# Patient Record
Sex: FEMALE | Race: WHITE | NOT HISPANIC OR LATINO | Employment: FULL TIME | URBAN - METROPOLITAN AREA
[De-identification: names, ages, dates, MRNs, and addresses within clinical notes are randomized per-mention and may not be internally consistent; named-entity substitution may affect disease eponyms.]

---

## 2017-04-07 ENCOUNTER — HOSPITAL ENCOUNTER (OUTPATIENT)
Dept: FAMILY MEDICINE CLINIC | Facility: CLINIC | Age: 57
Setting detail: SPECIMEN
Discharge: HOME | End: 2017-04-07
Attending: NURSE PRACTITIONER | Admitting: NURSE PRACTITIONER

## 2017-04-07 LAB
ANION GAP SERPL CALC-SCNC: 13.2 MMOL/L (ref 10–20)
BUN SERPL-MCNC: 20 MG/DL (ref 8–20)
BUN/CREAT SERPL: 28.6 (ref 5.4–26.2)
CALCIUM SERPL-MCNC: 9.4 MG/DL (ref 8.9–10.3)
CHLORIDE SERPL-SCNC: 104 MMOL/L (ref 101–111)
CONV CO2: 26 MMOL/L (ref 22–32)
CREAT UR-MCNC: 0.7 MG/DL (ref 0.4–1)
CRP SERPL-MCNC: 1.16 MG/DL (ref 0–0.7)
ERYTHROCYTE [SEDIMENTATION RATE] IN BLOOD BY WESTERGREN METHOD: 25 MM/HR (ref 0–30)
GLUCOSE SERPL-MCNC: 75 MG/DL (ref 65–99)
MAGNESIUM SERPL-MCNC: 2.1 MG/DL (ref 1.8–2.5)
POTASSIUM SERPL-SCNC: 4.2 MMOL/L (ref 3.6–5.1)
SODIUM SERPL-SCNC: 139 MMOL/L (ref 136–144)

## 2017-04-10 LAB
ANA SER QL IA: ABNORMAL
CENTROMERE B AB SER-ACNC: NEGATIVE
CHROMATIN AB SERPL-ACNC: <20 [IU]/ML (ref 0–20)
DSDNA AB SER-ACNC: NEGATIVE [IU]/ML
ENA JO1 IGG SER-ACNC: NEGATIVE
ENA RNP AB SER-ACNC: ABNORMAL
ENA SCL70 AB SER QL IA: NEGATIVE
ENA SM AB SER-ACNC: NEGATIVE
ENA SS-B AB SER-ACNC: NEGATIVE
HISTONE IGG SER IA-ACNC: NEGATIVE
SJOGREN'S ANTI-SS-A: NEGATIVE

## 2018-01-08 ENCOUNTER — HOSPITAL ENCOUNTER (OUTPATIENT)
Dept: LAB | Facility: HOSPITAL | Age: 58
Discharge: HOME | End: 2018-01-08
Attending: INTERNAL MEDICINE | Admitting: INTERNAL MEDICINE

## 2018-01-08 LAB
ALBUMIN SERPL-MCNC: 4.1 G/DL (ref 3.5–4.8)
ALBUMIN/GLOB SERPL: 1.3 {RATIO} (ref 1–1.7)
ALP SERPL-CCNC: 93 IU/L (ref 32–91)
ALT SERPL-CCNC: 22 IU/L (ref 14–54)
ANION GAP SERPL CALC-SCNC: 14 MMOL/L (ref 10–20)
AST SERPL-CCNC: 22 IU/L (ref 15–41)
BASOPHILS # BLD AUTO: 0.2 10*3/UL (ref 0–0.2)
BASOPHILS NFR BLD AUTO: 2 % (ref 0–2)
BILIRUB SERPL-MCNC: 0.3 MG/DL (ref 0.3–1.2)
BILIRUB UR QL STRIP: NEGATIVE MG/DL
BUN SERPL-MCNC: 11 MG/DL (ref 8–20)
BUN/CREAT SERPL: 15.7 (ref 5.4–26.2)
C3 SERPL-MCNC: 109 MG/DL (ref 79–152)
C4 SERPL-MCNC: 23.5 MG/DL (ref 18–55)
CALCIUM SERPL-MCNC: 9.3 MG/DL (ref 8.9–10.3)
CASTS URNS QL MICRO: ABNORMAL /[LPF]
CHLORIDE SERPL-SCNC: 104 MMOL/L (ref 101–111)
COLOR UR: YELLOW
CONV BACTERIA IN URINE MICRO: NEGATIVE
CONV CLARITY OF URINE: CLEAR
CONV CO2: 24 MMOL/L (ref 22–32)
CONV HYALINE CASTS IN URINE MICRO: 3 /[LPF] (ref 0–5)
CONV PROTEIN IN URINE BY AUTOMATED TEST STRIP: NEGATIVE MG/DL
CONV SMALL ROUND CELLS: ABNORMAL /[HPF]
CONV TOTAL PROTEIN: 7.3 G/DL (ref 6.1–7.9)
CONV UROBILINOGEN IN URINE BY AUTOMATED TEST STRIP: 0.2 MG/DL
CREAT UR-MCNC: 0.7 MG/DL (ref 0.4–1)
CRP SERPL-MCNC: 0.39 MG/DL (ref 0–0.7)
CULTURE INDICATED?: ABNORMAL
DIFFERENTIAL METHOD BLD: (no result)
EOSINOPHIL # BLD AUTO: 0.4 10*3/UL (ref 0–0.3)
EOSINOPHIL # BLD AUTO: 5 % (ref 0–3)
ERYTHROCYTE [DISTWIDTH] IN BLOOD BY AUTOMATED COUNT: 14.3 % (ref 11.5–14.5)
ERYTHROCYTE [SEDIMENTATION RATE] IN BLOOD BY WESTERGREN METHOD: 20 MM/HR (ref 0–30)
GLOBULIN UR ELPH-MCNC: 3.2 G/DL (ref 2.5–3.8)
GLUCOSE SERPL-MCNC: 81 MG/DL (ref 65–99)
GLUCOSE UR QL: NEGATIVE MG/DL
HAV IGM SERPL QL IA: NONREACTIVE
HBV CORE IGM SERPL QL IA: NONREACTIVE
HBV SURFACE AG SERPL QL IA: NONREACTIVE
HCT VFR BLD AUTO: 43.3 % (ref 35–49)
HCV AB SER DONR QL: NORMAL
HCV AB SER DONR QL: NORMAL
HGB BLD-MCNC: 14.1 G/DL (ref 12–15)
HGB UR QL STRIP: NEGATIVE
KETONES UR QL STRIP: NEGATIVE MG/DL
LEUKOCYTE ESTERASE UR QL STRIP: ABNORMAL
LYMPHOCYTES # BLD AUTO: 2 10*3/UL (ref 0.8–4.8)
LYMPHOCYTES NFR BLD AUTO: 26 % (ref 18–42)
MCH RBC QN AUTO: 29.9 PG (ref 26–32)
MCHC RBC AUTO-ENTMCNC: 32.5 G/DL (ref 32–36)
MCV RBC AUTO: 92 FL (ref 80–94)
MONOCYTES # BLD AUTO: 1.1 10*3/UL (ref 0.1–1.3)
MONOCYTES NFR BLD AUTO: 14 % (ref 2–11)
NEUTROPHILS # BLD AUTO: 4.1 10*3/UL (ref 2.3–8.6)
NEUTROPHILS NFR BLD AUTO: 53 % (ref 50–75)
NITRITE UR QL STRIP: NEGATIVE
NRBC BLD AUTO-RTO: 0 /100{WBCS}
NRBC/RBC NFR BLD MANUAL: 0 10*3/UL
PH UR STRIP.AUTO: 6.5 [PH] (ref 4.5–8)
PLATELET # BLD AUTO: 327 10*3/UL (ref 150–450)
PMV BLD AUTO: 9 FL (ref 7.4–10.4)
POTASSIUM SERPL-SCNC: 4 MMOL/L (ref 3.6–5.1)
RBC # BLD AUTO: 4.71 10*6/UL (ref 4–5.4)
RBC #/AREA URNS HPF: 1 /[HPF] (ref 0–3)
SODIUM SERPL-SCNC: 138 MMOL/L (ref 136–144)
SP GR UR: 1.01 (ref 1–1.03)
SPERM URNS QL MICRO: ABNORMAL /[HPF]
SQUAMOUS SPT QL MICRO: 3 /[HPF] (ref 0–5)
UNIDENT CRYS URNS QL MICRO: ABNORMAL /[HPF]
WBC # BLD AUTO: 7.7 10*3/UL (ref 4.5–11.5)
WBC #/AREA URNS HPF: 4 /[HPF] (ref 0–5)
YEAST SPEC QL WET PREP: ABNORMAL /[HPF]

## 2018-01-09 LAB
CONV HIV-1/ HIV-2: NORMAL
CONV HIV-1/ HIV-2: NORMAL

## 2018-01-11 LAB
ANTI-CARDIOLIPIN IGG ANTIBODY: <14 GPL
INR PPP: 1
PTT LA MIX: 41 SEC
SCREEN DRVVT: 35 SEC

## 2019-01-19 ENCOUNTER — HOSPITAL ENCOUNTER (OUTPATIENT)
Dept: LAB | Facility: HOSPITAL | Age: 59
Discharge: HOME | End: 2019-01-19
Attending: FAMILY MEDICINE | Admitting: FAMILY MEDICINE

## 2019-01-19 LAB
ALBUMIN SERPL-MCNC: 3.8 G/DL (ref 3.5–4.8)
ALBUMIN/GLOB SERPL: 1.2 {RATIO} (ref 1–1.7)
ALP SERPL-CCNC: 95 IU/L (ref 32–91)
ALT SERPL-CCNC: 17 IU/L (ref 14–54)
ANION GAP SERPL CALC-SCNC: 15.5 MMOL/L (ref 10–20)
AST SERPL-CCNC: 18 IU/L (ref 15–41)
BASOPHILS # BLD AUTO: 0.1 10*3/UL (ref 0–0.2)
BASOPHILS NFR BLD AUTO: 1 % (ref 0–2)
BILIRUB SERPL-MCNC: 0.4 MG/DL (ref 0.3–1.2)
BUN SERPL-MCNC: 16 MG/DL (ref 8–20)
BUN/CREAT SERPL: 20 (ref 5.4–26.2)
CALCIUM SERPL-MCNC: 9.2 MG/DL (ref 8.9–10.3)
CHLORIDE SERPL-SCNC: 98 MMOL/L (ref 101–111)
CHOLEST SERPL-MCNC: 197 MG/DL
CHOLEST/HDLC SERPL: 3.7 {RATIO}
CONV CO2: 25 MMOL/L (ref 22–32)
CONV LDL CHOLESTEROL DIRECT: 128 MG/DL (ref 0–100)
CONV TOTAL PROTEIN: 6.9 G/DL (ref 6.1–7.9)
CREAT UR-MCNC: 0.8 MG/DL (ref 0.4–1)
DIFFERENTIAL METHOD BLD: (no result)
EOSINOPHIL # BLD AUTO: 0.3 10*3/UL (ref 0–0.3)
EOSINOPHIL # BLD AUTO: 3 % (ref 0–3)
ERYTHROCYTE [DISTWIDTH] IN BLOOD BY AUTOMATED COUNT: 14.1 % (ref 11.5–14.5)
GLOBULIN UR ELPH-MCNC: 3.1 G/DL (ref 2.5–3.8)
GLUCOSE SERPL-MCNC: 98 MG/DL (ref 65–99)
HCT VFR BLD AUTO: 43.8 % (ref 35–49)
HDLC SERPL-MCNC: 53 MG/DL
HGB BLD-MCNC: 15.2 G/DL (ref 12–15)
IRON SATN MFR SERPL: 14 % (ref 15–50)
IRON SERPL-MCNC: 58 UG/DL (ref 28–170)
LDLC/HDLC SERPL: 2.4 {RATIO}
LIPID INTERPRETATION: ABNORMAL
LYMPHOCYTES # BLD AUTO: 1.6 10*3/UL (ref 0.8–4.8)
LYMPHOCYTES NFR BLD AUTO: 15 % (ref 18–42)
MAGNESIUM SERPL-MCNC: 2 MG/DL (ref 1.8–2.5)
MCH RBC QN AUTO: 31 PG (ref 26–32)
MCHC RBC AUTO-ENTMCNC: 34.6 G/DL (ref 32–36)
MCV RBC AUTO: 89.5 FL (ref 80–94)
MONOCYTES # BLD AUTO: 1.6 10*3/UL (ref 0.1–1.3)
MONOCYTES NFR BLD AUTO: 14 % (ref 2–11)
NEUTROPHILS # BLD AUTO: 7.4 10*3/UL (ref 2.3–8.6)
NEUTROPHILS NFR BLD AUTO: 67 % (ref 50–75)
NRBC BLD AUTO-RTO: 0 /100{WBCS}
NRBC/RBC NFR BLD MANUAL: 0 10*3/UL
PLATELET # BLD AUTO: 355 10*3/UL (ref 150–450)
PMV BLD AUTO: 7.9 FL (ref 7.4–10.4)
POTASSIUM SERPL-SCNC: 4.5 MMOL/L (ref 3.6–5.1)
RBC # BLD AUTO: 4.9 10*6/UL (ref 4–5.4)
SODIUM SERPL-SCNC: 134 MMOL/L (ref 136–144)
TIBC SERPL-MCNC: 404 UG/DL (ref 228–428)
TRIGL SERPL-MCNC: 68 MG/DL
VLDLC SERPL CALC-MCNC: 16.4 MG/DL
WBC # BLD AUTO: 11 10*3/UL (ref 4.5–11.5)

## 2019-02-02 ENCOUNTER — HOSPITAL ENCOUNTER (OUTPATIENT)
Dept: MAMMOGRAPHY | Facility: HOSPITAL | Age: 59
Discharge: HOME | End: 2019-02-02
Attending: FAMILY MEDICINE | Admitting: FAMILY MEDICINE

## 2019-02-14 ENCOUNTER — HOSPITAL ENCOUNTER (OUTPATIENT)
Dept: MAMMOGRAPHY | Facility: HOSPITAL | Age: 59
Discharge: HOME | End: 2019-02-14
Attending: FAMILY MEDICINE | Admitting: FAMILY MEDICINE

## 2019-07-24 ENCOUNTER — TELEPHONE (OUTPATIENT)
Dept: FAMILY MEDICINE CLINIC | Facility: CLINIC | Age: 59
End: 2019-07-24

## 2019-07-24 DIAGNOSIS — Z12.31 SCREENING MAMMOGRAM, ENCOUNTER FOR: Primary | ICD-10-CM

## 2019-07-25 ENCOUNTER — TELEPHONE (OUTPATIENT)
Dept: FAMILY MEDICINE CLINIC | Facility: CLINIC | Age: 59
End: 2019-07-25

## 2019-07-25 DIAGNOSIS — R92.8 ABNORMAL MAMMOGRAM: Primary | ICD-10-CM

## 2019-07-25 NOTE — TELEPHONE ENCOUNTER
RECEIVED CALL FROM DENNIS AT [facility]. PT IS DUE FOR A 6 MONTH F/U DIAG MAMMO ON THE LEFT, NOT A SCREENING. PLEASE ENTER NEW ORDER.

## 2019-08-16 ENCOUNTER — HOSPITAL ENCOUNTER (OUTPATIENT)
Dept: ULTRASOUND IMAGING | Facility: HOSPITAL | Age: 59
Discharge: HOME | End: 2019-08-16

## 2019-08-16 ENCOUNTER — HOSPITAL ENCOUNTER (OUTPATIENT)
Dept: MAMMOGRAPHY | Facility: HOSPITAL | Age: 59
Discharge: HOME | End: 2019-08-16
Admitting: FAMILY MEDICINE

## 2019-08-16 DIAGNOSIS — R92.8 ABNORMAL MAMMOGRAM: ICD-10-CM

## 2019-08-16 PROCEDURE — G0279 TOMOSYNTHESIS, MAMMO: HCPCS

## 2019-08-16 PROCEDURE — 77065 DX MAMMO INCL CAD UNI: CPT

## 2019-12-12 NOTE — TELEPHONE ENCOUNTER
PREVIOUS DR ZEPEDA PT -  PT CHANGING TO DR BARNES -   REQUESTS REFILL ON BREO  INHALER -   PT WILL BE OUT OF MEDICATION TOMORROW  -       PHAR   [facility] -   CALL IF ANY QUESTIONS  -    PT STATES SHE HAS LEFT SEVERAL MESSAGES PREVIOUSLY

## 2019-12-14 NOTE — TELEPHONE ENCOUNTER
Refill of Breo sent to patient's pharmacy.      Signature    Maria De Jesus Justin MD  Family [facility]        This document has been electronically signed by Maria De Jesus Justin MD on December 14, 2019 5:39 PM

## 2019-12-15 NOTE — TELEPHONE ENCOUNTER
Prescription sent on 12/14/2019.   EMR indicates that pharmacy received prescription.     Signature    Maria De Jesus Justin MD  Family Medicine  [facility]        This document has been electronically signed by Maria De Jesus Justin MD on December 15, 2019 12:25 PM

## 2020-03-25 ENCOUNTER — TELEPHONE (OUTPATIENT)
Dept: FAMILY MEDICINE CLINIC | Facility: CLINIC | Age: 60
End: 2020-03-25

## 2020-03-25 DIAGNOSIS — J01.90 ACUTE SINUSITIS, RECURRENCE NOT SPECIFIED, UNSPECIFIED LOCATION: Primary | ICD-10-CM

## 2020-03-25 RX ORDER — GUAIFENESIN AND DEXTROMETHORPHAN HYDROBROMIDE 600; 30 MG/1; MG/1
1 TABLET, EXTENDED RELEASE ORAL 2 TIMES DAILY PRN
Qty: 20 TABLET | Refills: 0 | Status: SHIPPED | OUTPATIENT
Start: 2020-03-25 | End: 2020-05-01

## 2020-03-25 RX ORDER — AZITHROMYCIN 250 MG/1
TABLET, FILM COATED ORAL
Qty: 6 TABLET | Refills: 0 | Status: SHIPPED | OUTPATIENT
Start: 2020-03-25 | End: 2020-05-01

## 2020-03-25 RX ORDER — FLUTICASONE PROPIONATE 50 MCG
2 SPRAY, SUSPENSION (ML) NASAL DAILY
Qty: 11.1 ML | Refills: 5 | Status: SHIPPED | OUTPATIENT
Start: 2020-03-25 | End: 2020-05-01

## 2020-03-25 NOTE — TELEPHONE ENCOUNTER
Patient called think it her allergies, but she is coughing, congested, and watery eyes. She does take care of disable daughter so it has her worried with every thing going on right now. Good number to call back is [phone]

## 2020-03-25 NOTE — TELEPHONE ENCOUNTER
Returned patient's call.    Patient is a former smoker, quit in 2011, with a concurrent medical history of asthma and allergies.  Asthma is currently treated with Brio inhaler and PRN albuterol.  Patient reports compliance with maintenance inhalers.  Reports occasional antihistamine use for seasonal allergies.  Reports compliance with Flonase.  Instructed patient on appropriate use of Flonase.    Patient complains of 3 weeks of sinus congestion, rhinorrhea, postnasal drip, cough, and chest congestion.  Denies any fever or shortness of breath.  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  Patient requesting a prescription for azithromycin.  Z-Benjie, Mucinex, and Flonase sent to patient's pharmacy.  Patient advised to call the office if symptoms worsen.  Encouraged adequate hydration and personal hand hygiene.

## 2020-04-20 RX ORDER — ATORVASTATIN CALCIUM 10 MG/1
10 TABLET, FILM COATED ORAL NIGHTLY
Qty: 30 TABLET | Refills: 3 | Status: SHIPPED | OUTPATIENT
Start: 2020-04-20 | End: 2020-08-20

## 2020-04-20 RX ORDER — DULOXETIN HYDROCHLORIDE 60 MG/1
60 CAPSULE, DELAYED RELEASE ORAL DAILY
Qty: 30 CAPSULE | Refills: 3 | Status: SHIPPED | OUTPATIENT
Start: 2020-04-20 | End: 2020-08-20

## 2020-05-01 ENCOUNTER — OFFICE VISIT (OUTPATIENT)
Dept: FAMILY MEDICINE CLINIC | Facility: CLINIC | Age: 60
End: 2020-05-01

## 2020-05-01 VITALS
WEIGHT: 117 LBS | BODY MASS INDEX: 20.73 KG/M2 | DIASTOLIC BLOOD PRESSURE: 74 MMHG | SYSTOLIC BLOOD PRESSURE: 122 MMHG | OXYGEN SATURATION: 99 % | HEART RATE: 93 BPM | TEMPERATURE: 98.1 F

## 2020-05-01 DIAGNOSIS — J30.89 ENVIRONMENTAL AND SEASONAL ALLERGIES: ICD-10-CM

## 2020-05-01 DIAGNOSIS — Z12.31 ENCOUNTER FOR SCREENING MAMMOGRAM FOR MALIGNANT NEOPLASM OF BREAST: ICD-10-CM

## 2020-05-01 DIAGNOSIS — Z12.11 ENCOUNTER FOR SCREENING FOR MALIGNANT NEOPLASM OF COLON: ICD-10-CM

## 2020-05-01 DIAGNOSIS — E78.2 MIXED HYPERLIPIDEMIA: ICD-10-CM

## 2020-05-01 DIAGNOSIS — F41.8 MIXED ANXIETY DEPRESSIVE DISORDER: Primary | ICD-10-CM

## 2020-05-01 DIAGNOSIS — Z11.59 NEED FOR HEPATITIS C SCREENING TEST: ICD-10-CM

## 2020-05-01 DIAGNOSIS — E55.9 VITAMIN D DEFICIENCY: ICD-10-CM

## 2020-05-01 PROBLEM — M32.9 SLE (SYSTEMIC LUPUS ERYTHEMATOSUS) (HCC): Status: ACTIVE | Noted: 2018-01-08

## 2020-05-01 PROBLEM — M19.90 OSTEOARTHRITIS: Status: ACTIVE | Noted: 2018-03-05

## 2020-05-01 PROBLEM — M62.838 MUSCLE SPASM: Status: ACTIVE | Noted: 2020-05-01

## 2020-05-01 PROCEDURE — 80053 COMPREHEN METABOLIC PANEL: CPT | Performed by: FAMILY MEDICINE

## 2020-05-01 PROCEDURE — 86803 HEPATITIS C AB TEST: CPT | Performed by: FAMILY MEDICINE

## 2020-05-01 PROCEDURE — 80061 LIPID PANEL: CPT | Performed by: FAMILY MEDICINE

## 2020-05-01 PROCEDURE — 82306 VITAMIN D 25 HYDROXY: CPT | Performed by: FAMILY MEDICINE

## 2020-05-01 PROCEDURE — 85025 COMPLETE CBC W/AUTO DIFF WBC: CPT | Performed by: FAMILY MEDICINE

## 2020-05-01 PROCEDURE — 36415 COLL VENOUS BLD VENIPUNCTURE: CPT | Performed by: FAMILY MEDICINE

## 2020-05-01 PROCEDURE — 99214 OFFICE O/P EST MOD 30 MIN: CPT | Performed by: FAMILY MEDICINE

## 2020-05-01 NOTE — PROGRESS NOTES
Subjective:     Justa Coles is a 60 y.o. female who presents for  Chief Complaint   Patient presents with   • Anxiety     f/u on anxiety,chol,meds   • Hyperlipidemia       This is a new patient to me.  Presents establish care. Patient works as a home health nurse.    HPI    Patient has a concurrent medical history of hyperlipidemia that is currently well controlled with atorvastatin 10 mg daily.  Patient is normotensive in office.  Reports generally unhealthy diet.  Does not participate in regular exercise; walks on occasion.  Plan to recheck lipid panel today.    Patient has a concurrent medical history of seasonal allergies. Complains of nasal congestion, watery eyes, occasional dyspnea, and cough that may or may not be productive. Patient was started on Breo by a former healthcare provider and reported improvement of cough and dyspnea. Of note patient is a former smoker of 25 years smoking ~1 PPD; quit smoking approximately 9 years ago.    Patient has a concurrent medical history of anxiety and depression that is poorly controlled. Symptoms are exacerbated by sick  and caring for daughter with Down syndrome. Complains of trouble concentrating and diminished appetite. Patient is not compliant with pharmacotherapy, Cymbalta 60 mg; restarted therapy approximately a month ago due to declining health of  and COVID-19 pandemic.     Preventative:  PHQ-9 Depression Screening  Little interest or pleasure in doing things? 0(not able to do things due to lack of time)   Feeling down, depressed, or hopeless? 0   Trouble falling or staying asleep, or sleeping too much?     Feeling tired or having little energy?     Poor appetite or overeating?     Feeling bad about yourself - or that you are a failure or have let yourself or your family down?     Trouble concentrating on things, such as reading the newspaper or watching television?     Moving or speaking so slowly that other people could have noticed? Or  the opposite - being so fidgety or restless that you have been moving around a lot more than usual?     Thoughts that you would be better off dead, or of hurting yourself in some way?     PHQ-9 Total Score 0   If you checked off any problems, how difficult have these problems made it for you to do your work, take care of things at home, or get along with other people?       Health Maintenance   Topic Date Due   • ANNUAL PHYSICAL  1963   • TDAP/TD VACCINES (1 - Tdap) 1970   • PAP SMEAR  2019   • COLONOSCOPY  2019   • LIPID PANEL  2020   • INFLUENZA VACCINE  2020   • MAMMOGRAM  2021   • HEPATITIS C SCREENING  Completed   • ZOSTER VACCINE  Completed       Past Medical Hx:  Past Medical History:   Diagnosis Date   • Hyperlipidemia        Past Surgical Hx:  Past Surgical History:   Procedure Laterality Date   • APPENDECTOMY     •  SECTION     • CYST REMOVAL      behind left knee   • TONSILLECTOMY         Family Hx:  Family History   Problem Relation Age of Onset   • Cancer Father    • Cancer Maternal Grandmother    • Cancer Paternal Grandmother         Social History:  Social History     Socioeconomic History   • Marital status:      Spouse name: Not on file   • Number of children: Not on file   • Years of education: Not on file   • Highest education level: Not on file   Tobacco Use   • Smoking status: Former Smoker     Last attempt to quit: 2011     Years since quittin.9   • Smokeless tobacco: Never Used   Substance and Sexual Activity   • Alcohol use: Not Currently     Frequency: Never   • Drug use: Never       Allergies:  Sulfa antibiotics    Current Meds:    Current Outpatient Medications:   •  albuterol sulfate HFA (PROAIR HFA) 108 (90 Base) MCG/ACT inhaler, PROAIR  (90 Base) MCG/ACT AERS, Disp: , Rfl:   •  atorvastatin (LIPITOR) 10 MG tablet, Take 1 tablet by mouth Every Night., Disp: 30 tablet, Rfl: 3  •  DULoxetine (CYMBALTA) 60 MG capsule,  Take 1 capsule by mouth Daily., Disp: 30 capsule, Rfl: 3  •  Fluticasone Furoate-Vilanterol (BREO ELLIPTA) 200-25 MCG/INH inhaler, Inhale 1 puff Daily., Disp: 28 each, Rfl: 5    The following portions of the patient's history were reviewed and updated as appropriate: allergies, current medications, past family history, past medical history, past social history, past surgical history and problem list.    Review of Systems  Review of Systems   Constitutional: Negative for chills, diaphoresis, fatigue and fever.   HENT: Negative for congestion, rhinorrhea, sinus pressure, sneezing and sore throat.    Eyes: Negative for blurred vision, double vision and redness.   Respiratory: Positive for cough. Negative for shortness of breath and wheezing.    Cardiovascular: Negative for chest pain and leg swelling.   Gastrointestinal: Negative for abdominal pain, constipation, diarrhea, nausea and vomiting.   Endocrine: Negative for polydipsia, polyphagia and polyuria.   Genitourinary: Negative for difficulty urinating, dysuria and hematuria.   Musculoskeletal: Positive for myalgias (recurrent and waxing and waning). Negative for arthralgias and gait problem.   Skin: Negative for rash and skin lesions.   Allergic/Immunologic: Positive for environmental allergies.   Neurological: Negative for tremors, seizures, syncope and headache.   Psychiatric/Behavioral: Positive for stress. Negative for sleep disturbance and depressed mood. The patient is not nervous/anxious.        Objective:     /74 (BP Location: Left arm, Patient Position: Sitting, Cuff Size: Small Adult)   Pulse 93   Temp 98.1 °F (36.7 °C) (Oral)   Wt 53.1 kg (117 lb)   SpO2 99%   BMI 20.73 kg/m²     Physical Exam   Constitutional: She is oriented to person, place, and time. She appears well-developed and well-nourished. No distress.   HENT:   Head: Normocephalic and atraumatic.   Right Ear: Tympanic membrane and ear canal normal.   Left Ear: Tympanic membrane and  ear canal normal.   Nose: Nose normal.   Mouth/Throat: Oropharynx is clear and moist and mucous membranes are normal. She has dentures (maxillary).   Eyes: Pupils are equal, round, and reactive to light. Conjunctivae and EOM are normal. No scleral icterus.   Neck: Neck supple. No tracheal deviation present. No thyromegaly present.   Cardiovascular: Normal rate, regular rhythm, normal heart sounds and intact distal pulses.   Pulmonary/Chest: Effort normal and breath sounds normal.   Abdominal: Soft. Bowel sounds are normal. There is no tenderness.   Lymphadenopathy:     She has no cervical adenopathy.   Neurological: She is alert and oriented to person, place, and time.   Skin: Skin is warm and dry. Capillary refill takes less than 2 seconds. No rash noted. She is not diaphoretic.   Psychiatric: She has a normal mood and affect. Her behavior is normal.   Vitals reviewed.      Lab Results   Component Value Date    WBC 11.0 01/19/2019    HGB 15.2 (H) 01/19/2019    HCT 43.8 01/19/2019    MCV 89.5 01/19/2019     01/19/2019     Lab Results   Component Value Date    GLUCOSE 98 01/19/2019    BUN 16 01/19/2019    CREATININE 0.8 01/19/2019    BCR 20.0 01/19/2019    K 4.5 01/19/2019    CO2 25 01/19/2019    CALCIUM 9.2 01/19/2019    ALBUMIN 3.8 01/19/2019    LABIL2 1.2 01/19/2019    AST 18 01/19/2019    ALT 17 01/19/2019     Lab Results   Component Value Date    CHOL 197 01/19/2019    TRIG 68 01/19/2019    HDL 53 01/19/2019     (H) 01/19/2019     The 10-year ASCVD risk score ([place] JEROMY Jr., et al., 2013) is: 3.4%    Values used to calculate the score:      Age: 60 years      Sex: Female      Is Non- : No      Diabetic: No      Tobacco smoker: No      Systolic Blood Pressure: 122 mmHg      Is BP treated: No      HDL Cholesterol: 53 mg/dL      Total Cholesterol: 197 mg/dL       Assessment/Plan:     Justa was seen today for anxiety and hyperlipidemia.    Diagnoses and all orders for this  visit:    Mixed anxiety depressive disorder  Comments:  Poorly controlled due to medication compliance.  Continue Cymbalta 60 mg.  Educational material in AVS.  Orders:  -     Comprehensive Metabolic Panel  -     CBC Auto Differential    Mixed hyperlipidemia  Comments:  Reviewed lipid panel & 10-year ASCVD risk score.  Encouraged a diet rich in vegetables & 150 minutes of exercise weekly.  Continue atorvastatin 10 mg.  Orders:  -     Lipid Panel    Environmental and seasonal allergies  Comments:  Moderately controlled with Breo Ellipta daily.  May benefit from PFT to evaluate for respiratory dysfunction.    Vitamin D deficiency  -     Vitamin D 25 hydroxy    Need for hepatitis C screening test  -     Hepatitis C antibody    Encounter for screening mammogram for malignant neoplasm of breast  -     Mammo Screening Digital Tomosynthesis Bilateral With CAD; Future    Encounter for screening for malignant neoplasm of colon  -     Ambulatory Referral For Screening Colonoscopy      Follow-up:     Return in about 6 months (around 11/1/2020) for Annual Physical Exam & Pap.      Signature    Maria De Jesus Justin MD  Family Medicine  [facility]        This document has been electronically signed by Maria De Jesus Justin MD on May 1, 2020 08:39

## 2020-05-01 NOTE — PATIENT INSTRUCTIONS
Preventive Care 40-64 Years Old, Female  Preventive care refers to visits with your health care provider and lifestyle choices that can promote health and wellness. This includes:  · A yearly physical exam. This may also be called an annual well check.  · Regular dental visits and eye exams.  · Immunizations.  · Screening for certain conditions.  · Healthy lifestyle choices, such as eating a healthy diet, getting regular exercise, not using drugs or products that contain nicotine and tobacco, and limiting alcohol use.  What can I expect for my preventive care visit?  Physical exam  Your health care provider will check your:  · Height and weight. This may be used to calculate body mass index (BMI), which tells if you are at a healthy weight.  · Heart rate and blood pressure.  · Skin for abnormal spots.  Counseling  Your health care provider may ask you questions about your:  · Alcohol, tobacco, and drug use.  · Emotional well-being.  · Home and relationship well-being.  · Sexual activity.  · Eating habits.  · Work and work environment.  · Method of birth control.  · Menstrual cycle.  · Pregnancy history.  What immunizations do I need?    Influenza (flu) vaccine  · This is recommended every year.  Tetanus, diphtheria, and pertussis (Tdap) vaccine  · You may need a Td booster every 10 years.  Varicella (chickenpox) vaccine  · You may need this if you have not been vaccinated.  Zoster (shingles) vaccine  · You may need this after age 60.  Measles, mumps, and rubella (MMR) vaccine  · You may need at least one dose of MMR if you were born in 1957 or later. You may also need a second dose.  Pneumococcal conjugate (PCV13) vaccine  · You may need this if you have certain conditions and were not previously vaccinated.  Pneumococcal polysaccharide (PPSV23) vaccine  · You may need one or two doses if you smoke cigarettes or if you have certain conditions.  Meningococcal conjugate (MenACWY) vaccine  · You may need this if you  have certain conditions.  Hepatitis A vaccine  · You may need this if you have certain conditions or if you travel or work in places where you may be exposed to hepatitis A.  Hepatitis B vaccine  · You may need this if you have certain conditions or if you travel or work in places where you may be exposed to hepatitis B.  Haemophilus influenzae type b (Hib) vaccine  · You may need this if you have certain conditions.  Human papillomavirus (HPV) vaccine  · If recommended by your health care provider, you may need three doses over 6 months.  You may receive vaccines as individual doses or as more than one vaccine together in one shot (combination vaccines). Talk with your health care provider about the risks and benefits of combination vaccines.  What tests do I need?  Blood tests  · Lipid and cholesterol levels. These may be checked every 5 years, or more frequently if you are over 50 years old.  · Hepatitis C test.  · Hepatitis B test.  Screening  · Lung cancer screening. You may have this screening every year starting at age 55 if you have a 30-pack-year history of smoking and currently smoke or have quit within the past 15 years.  · Colorectal cancer screening. All adults should have this screening starting at age 50 and continuing until age 75. Your health care provider may recommend screening at age 45 if you are at increased risk. You will have tests every 1-10 years, depending on your results and the type of screening test.  · Diabetes screening. This is done by checking your blood sugar (glucose) after you have not eaten for a while (fasting). You may have this done every 1-3 years.  · Mammogram. This may be done every 1-2 years. Talk with your health care provider about when you should start having regular mammograms. This may depend on whether you have a family history of breast cancer.  · BRCA-related cancer screening. This may be done if you have a family history of breast, ovarian, tubal, or peritoneal  cancers.  · Pelvic exam and Pap test. This may be done every 3 years starting at age 21. Starting at age 30, this may be done every 5 years if you have a Pap test in combination with an HPV test.  Other tests  · Sexually transmitted disease (STD) testing.  · Bone density scan. This is done to screen for osteoporosis. You may have this scan if you are at high risk for osteoporosis.  Follow these instructions at home:  Eating and drinking  · Eat a diet that includes fresh fruits and vegetables, whole grains, lean protein, and low-fat dairy.  · Take vitamin and mineral supplements as recommended by your health care provider.  · Do not drink alcohol if:  ? Your health care provider tells you not to drink.  ? You are pregnant, may be pregnant, or are planning to become pregnant.  · If you drink alcohol:  ? Limit how much you have to 0-1 drink a day.  ? Be aware of how much alcohol is in your drink. In the [place] one drink equals one 12 oz bottle of beer (355 mL), one 5 oz glass of wine (148 mL), or one 1½ oz glass of hard liquor (44 mL).  Lifestyle  · Take daily care of your teeth and gums.  · Stay active. Exercise for at least 30 minutes on 5 or more days each week.  · Do not use any products that contain nicotine or tobacco, such as cigarettes, e-cigarettes, and chewing tobacco. If you need help quitting, ask your health care provider.  · If you are sexually active, practice safe sex. Use a condom or other form of birth control (contraception) in order to prevent pregnancy and STIs (sexually transmitted infections).  · If told by your health care provider, take low-dose aspirin daily starting at age 50.  What's next?  · Visit your health care provider once a year for a well check visit.  · Ask your health care provider how often you should have your eyes and teeth checked.  · Stay up to date on all vaccines.  This information is not intended to replace advice given to you by your health care provider. Make sure you  discuss any questions you have with your health care provider.  Document Released: 01/13/2017 Document Revised: 08/29/2019 Document Reviewed: 08/29/2019  [employer] Interactive Patient Education © 2020 [employer]      Exercising to Stay Healthy  To become healthy and stay healthy, it is recommended that you do moderate-intensity and vigorous-intensity exercise. You can tell that you are exercising at a moderate intensity if your heart starts beating faster and you start breathing faster but can still hold a conversation. You can tell that you are exercising at a vigorous intensity if you are breathing much harder and faster and cannot hold a conversation while exercising.  Exercising regularly is important. It has many health benefits, such as:  · Improving overall fitness, flexibility, and endurance.  · Increasing bone density.  · Helping with weight control.  · Decreasing body fat.  · Increasing muscle strength.  · Reducing stress and tension.  · Improving overall health.  How often should I exercise?  Choose an activity that you enjoy, and set realistic goals. Your health care provider can help you make an activity plan that works for you.  Exercise regularly as told by your health care provider. This may include:  · Doing strength training two times a week, such as:  ? Lifting weights.  ? Using resistance bands.  ? Push-ups.  ? Sit-ups.  ? Yoga.  · Doing a certain intensity of exercise for a given amount of time. Choose from these options:  ? A total of 150 minutes of moderate-intensity exercise every week.  ? A total of 75 minutes of vigorous-intensity exercise every week.  ? A mix of moderate-intensity and vigorous-intensity exercise every week.  Children, pregnant women, people who have not exercised regularly, people who are overweight, and older adults may need to talk with a health care provider about what activities are safe to do. If you have a medical condition, be sure to talk with your health care  provider before you start a new exercise program.  What are some exercise ideas?  Moderate-intensity exercise ideas include:  · Walking 1 mile (1.6 km) in about 15 minutes.  · Biking.  · Hiking.  · Golfing.  · Dancing.  · Water aerobics.  Vigorous-intensity exercise ideas include:  · Walking 4.5 miles (7.2 km) or more in about 1 hour.  · Jogging or running 5 miles (8 km) in about 1 hour.  · Biking 10 miles (16.1 km) or more in about 1 hour.  · Lap swimming.  · Roller-skating or in-line skating.  · Cross-country skiing.  · Vigorous competitive sports, such as football, basketball, and soccer.  · Jumping rope.  · Aerobic dancing.  What are some everyday activities that can help me to get exercise?  · Yard work, such as:  ? Pushing a .  ? Raking and bagging leaves.  · Washing your car.  · Pushing a stroller.  · Shoveling snow.  · Gardening.  · Washing windows or floors.  How can I be more active in my day-to-day activities?  · Use stairs instead of an elevator.  · Take a walk during your lunch break.  · If you drive, park your car farther away from your work or school.  · If you take public transportation, get off one stop early and walk the rest of the way.  · Stand up or walk around during all of your indoor phone calls.  · Get up, stretch, and walk around every 30 minutes throughout the day.  · Enjoy exercise with a friend. Support to continue exercising will help you keep a regular routine of activity.  What guidelines can I follow while exercising?  · Before you start a new exercise program, talk with your health care provider.  · Do not exercise so much that you hurt yourself, feel dizzy, or get very short of breath.  · Wear comfortable clothes and wear shoes with good support.  · Drink plenty of water while you exercise to prevent dehydration or heat stroke.  · Work out until your breathing and your heartbeat get faster.  Where to find more information  · [place] Department of Health and Human Services:  www.hhs.gov  · Centers for Disease Control and Prevention (CDC): www.cdc.gov  Summary  · Exercising regularly is important. It will improve your overall fitness, flexibility, and endurance.  · Regular exercise also will improve your overall health. It can help you control your weight, reduce stress, and improve your bone density.  · Do not exercise so much that you hurt yourself, feel dizzy, or get very short of breath.  · Before you start a new exercise program, talk with your health care provider.  This information is not intended to replace advice given to you by your health care provider. Make sure you discuss any questions you have with your health care provider.  Document Released: 01/20/2012 Document Revised: 11/08/2018 Document Reviewed: 11/08/2018  [employer] Interactive Patient Education © 2020 [employer]      Living With Anxiety    After being diagnosed with an anxiety disorder, you may be relieved to know why you have felt or behaved a certain way. It is natural to also feel overwhelmed about the treatment ahead and what it will mean for your life. With care and support, you can manage this condition and recover from it.  How to cope with anxiety  Dealing with stress  Stress is your body’s reaction to life changes and events, both good and bad. Stress can last just a few hours or it can be ongoing. Stress can play a major role in anxiety, so it is important to learn both how to cope with stress and how to think about it differently.  Talk with your health care provider or a counselor to learn more about stress reduction. He or she may suggest some stress reduction techniques, such as:  · Music therapy. This can include creating or listening to music that you enjoy and that inspires you.  · Mindfulness-based meditation. This involves being aware of your normal breaths, rather than trying to control your breathing. It can be done while sitting or walking.  · Centering prayer. This is a kind of meditation  that involves focusing on a word, phrase, or sacred image that is meaningful to you and that brings you peace.  · Deep breathing. To do this, expand your stomach and inhale slowly through your nose. Hold your breath for 3-5 seconds. Then exhale slowly, allowing your stomach muscles to relax.  · Self-talk. This is a skill where you identify thought patterns that lead to anxiety reactions and correct those thoughts.  · Muscle relaxation. This involves tensing muscles then relaxing them.  Choose a stress reduction technique that fits your lifestyle and personality. Stress reduction techniques take time and practice. Set aside 5-15 minutes a day to do them. Therapists can offer training in these techniques. The training may be covered by some insurance plans. Other things you can do to manage stress include:  · Keeping a stress diary. This can help you learn what triggers your stress and ways to control your response.  · Thinking about how you respond to certain situations. You may not be able to control everything, but you can control your reaction.  · Making time for activities that help you relax, and not feeling guilty about spending your time in this way.  Therapy combined with coping and stress-reduction skills provides the best chance for successful treatment.  Medicines  Medicines can help ease symptoms. Medicines for anxiety include:  · Anti-anxiety drugs.  · Antidepressants.  · Beta-blockers.  Medicines may be used as the main treatment for anxiety disorder, along with therapy, or if other treatments are not working. Medicines should be prescribed by a health care provider.  Relationships  Relationships can play a big part in helping you recover. Try to spend more time connecting with trusted friends and family members. Consider going to couples counseling, taking family education classes, or going to family therapy. Therapy can help you and others better understand the condition.  How to recognize changes in  your condition  Everyone has a different response to treatment for anxiety. Recovery from anxiety happens when symptoms decrease and stop interfering with your daily activities at home or work. This may mean that you will start to:  · Have better concentration and focus.  · Sleep better.  · Be less irritable.  · Have more energy.  · Have improved memory.  It is important to recognize when your condition is getting worse. Contact your health care provider if your symptoms interfere with home or work and you do not feel like your condition is improving.  Where to find help and support:  You can get help and support from these sources:  · Self-help groups.  · Online and community organizations.  · A trusted spiritual leader.  · Couples counseling.  · Family education classes.  · Family therapy.  Follow these instructions at home:  · Eat a healthy diet that includes plenty of vegetables, fruits, whole grains, low-fat dairy products, and lean protein. Do not eat a lot of foods that are high in solid fats, added sugars, or salt.  · Exercise. Most adults should do the following:  ? Exercise for at least 150 minutes each week. The exercise should increase your heart rate and make you sweat (moderate-intensity exercise).  ? Strengthening exercises at least twice a week.  · Cut down on caffeine, tobacco, alcohol, and other potentially harmful substances.  · Get the right amount and quality of sleep. Most adults need 7-9 hours of sleep each night.  · Make choices that simplify your life.  · Take over-the-counter and prescription medicines only as told by your health care provider.  · Avoid caffeine, alcohol, and certain over-the-counter cold medicines. These may make you feel worse. Ask your pharmacist which medicines to avoid.  · Keep all follow-up visits as told by your health care provider. This is important.  Questions to ask your health care provider  · Would I benefit from therapy?  · How often should I follow up with a  health care provider?  · How long do I need to take medicine?  · Are there any long-term side effects of my medicine?  · Are there any alternatives to taking medicine?  Contact a health care provider if:  · You have a hard time staying focused or finishing daily tasks.  · You spend many hours a day feeling worried about everyday life.  · You become exhausted by worry.  · You start to have headaches, feel tense, or have nausea.  · You urinate more than normal.  · You have diarrhea.  Get help right away if:  · You have a racing heart and shortness of breath.  · You have thoughts of hurting yourself or others.  If you ever feel like you may hurt yourself or others, or have thoughts about taking your own life, get help right away. You can go to your nearest emergency department or call:  · Your local emergency services (911 in the [place]  · A suicide crisis helpline, such as the National Suicide Prevention Lifeline at 1-130.326.4504. This is open 24-hours a day.  Summary  · Taking steps to deal with stress can help calm you.  · Medicines cannot cure anxiety disorders, but they can help ease symptoms.  · Family, friends, and partners can play a big part in helping you recover from an anxiety disorder.  This information is not intended to replace advice given to you by your health care provider. Make sure you discuss any questions you have with your health care provider.  Document Released: 12/12/2017 Document Revised: 12/12/2017 Document Reviewed: 12/12/2017  [employer] Interactive Patient Education © 2020 [employer]

## 2020-05-02 LAB
25(OH)D3 SERPL-MCNC: 15.9 NG/ML (ref 30–100)
ALBUMIN SERPL-MCNC: 4.2 G/DL (ref 3.5–5.2)
ALBUMIN/GLOB SERPL: 1.2 G/DL
ALP SERPL-CCNC: 91 U/L (ref 39–117)
ALT SERPL W P-5'-P-CCNC: 10 U/L (ref 1–33)
ANION GAP SERPL CALCULATED.3IONS-SCNC: 13.3 MMOL/L (ref 5–15)
AST SERPL-CCNC: 16 U/L (ref 1–32)
BASOPHILS # BLD AUTO: 0.13 10*3/MM3 (ref 0–0.2)
BASOPHILS NFR BLD AUTO: 1.4 % (ref 0–1.5)
BILIRUB SERPL-MCNC: 0.4 MG/DL (ref 0.2–1.2)
BUN BLD-MCNC: 16 MG/DL (ref 8–23)
BUN/CREAT SERPL: 21.1 (ref 7–25)
CALCIUM SPEC-SCNC: 9.5 MG/DL (ref 8.6–10.5)
CHLORIDE SERPL-SCNC: 100 MMOL/L (ref 98–107)
CHOLEST SERPL-MCNC: 227 MG/DL (ref 0–200)
CO2 SERPL-SCNC: 26.7 MMOL/L (ref 22–29)
CREAT BLD-MCNC: 0.76 MG/DL (ref 0.57–1)
DEPRECATED RDW RBC AUTO: 46.5 FL (ref 37–54)
EOSINOPHIL # BLD AUTO: 0.18 10*3/MM3 (ref 0–0.4)
EOSINOPHIL NFR BLD AUTO: 1.9 % (ref 0.3–6.2)
ERYTHROCYTE [DISTWIDTH] IN BLOOD BY AUTOMATED COUNT: 14.6 % (ref 12.3–15.4)
GFR SERPL CREATININE-BSD FRML MDRD: 77 ML/MIN/1.73
GLOBULIN UR ELPH-MCNC: 3.4 GM/DL
GLUCOSE BLD-MCNC: 92 MG/DL (ref 65–99)
HCT VFR BLD AUTO: 42.5 % (ref 34–46.6)
HCV AB SER DONR QL: NORMAL
HDLC SERPL-MCNC: 56 MG/DL (ref 40–60)
HGB BLD-MCNC: 14.3 G/DL (ref 12–15.9)
IMM GRANULOCYTES # BLD AUTO: 0.04 10*3/MM3 (ref 0–0.05)
IMM GRANULOCYTES NFR BLD AUTO: 0.4 % (ref 0–0.5)
LDLC SERPL CALC-MCNC: 159 MG/DL (ref 0–100)
LDLC/HDLC SERPL: 2.84 {RATIO}
LYMPHOCYTES # BLD AUTO: 1.99 10*3/MM3 (ref 0.7–3.1)
LYMPHOCYTES NFR BLD AUTO: 21.4 % (ref 19.6–45.3)
MCH RBC QN AUTO: 29.2 PG (ref 26.6–33)
MCHC RBC AUTO-ENTMCNC: 33.6 G/DL (ref 31.5–35.7)
MCV RBC AUTO: 86.7 FL (ref 79–97)
MONOCYTES # BLD AUTO: 1.09 10*3/MM3 (ref 0.1–0.9)
MONOCYTES NFR BLD AUTO: 11.7 % (ref 5–12)
NEUTROPHILS # BLD AUTO: 5.85 10*3/MM3 (ref 1.7–7)
NEUTROPHILS NFR BLD AUTO: 63.2 % (ref 42.7–76)
NRBC BLD AUTO-RTO: 0 /100 WBC (ref 0–0.2)
PLATELET # BLD AUTO: 405 10*3/MM3 (ref 140–450)
PMV BLD AUTO: 10.3 FL (ref 6–12)
POTASSIUM BLD-SCNC: 4.4 MMOL/L (ref 3.5–5.2)
PROT SERPL-MCNC: 7.6 G/DL (ref 6–8.5)
RBC # BLD AUTO: 4.9 10*6/MM3 (ref 3.77–5.28)
SODIUM BLD-SCNC: 140 MMOL/L (ref 136–145)
TRIGL SERPL-MCNC: 60 MG/DL (ref 0–150)
VLDLC SERPL-MCNC: 12 MG/DL (ref 5–40)
WBC NRBC COR # BLD: 9.28 10*3/MM3 (ref 3.4–10.8)

## 2020-05-03 DIAGNOSIS — E55.9 VITAMIN D DEFICIENCY: Primary | ICD-10-CM

## 2020-05-03 RX ORDER — ERGOCALCIFEROL 1.25 MG/1
50000 CAPSULE ORAL WEEKLY
Qty: 12 CAPSULE | Refills: 0 | Status: SHIPPED | OUTPATIENT
Start: 2020-05-03 | End: 2020-07-20

## 2020-05-30 DIAGNOSIS — J45.40 MODERATE PERSISTENT REACTIVE AIRWAY DISEASE WITHOUT COMPLICATION: Primary | ICD-10-CM

## 2020-07-15 DIAGNOSIS — R92.0 ABNORMAL MAMMOGRAM WITH MICROCALCIFICATION: Primary | ICD-10-CM

## 2020-07-17 ENCOUNTER — TRANSCRIBE ORDERS (OUTPATIENT)
Dept: ADMINISTRATIVE | Facility: HOSPITAL | Age: 60
End: 2020-07-17

## 2020-07-17 ENCOUNTER — TELEPHONE (OUTPATIENT)
Dept: FAMILY MEDICINE CLINIC | Facility: CLINIC | Age: 60
End: 2020-07-17

## 2020-07-17 DIAGNOSIS — R92.0 ABNORMAL MAMMOGRAM WITH MICROCALCIFICATION: Primary | ICD-10-CM

## 2020-07-17 NOTE — TELEPHONE ENCOUNTER
Hospital scheduling called regarding orders for US breast left and right. Asking that you put in order for bilateral if you are really wanting US of left and right breast.

## 2020-07-19 DIAGNOSIS — E55.9 VITAMIN D DEFICIENCY: ICD-10-CM

## 2020-07-20 RX ORDER — ERGOCALCIFEROL 1.25 MG/1
50000 CAPSULE ORAL WEEKLY
Qty: 12 CAPSULE | Refills: 0 | Status: SHIPPED | OUTPATIENT
Start: 2020-07-20 | End: 2020-09-11

## 2020-08-20 RX ORDER — ATORVASTATIN CALCIUM 10 MG/1
10 TABLET, FILM COATED ORAL NIGHTLY
Qty: 90 TABLET | Refills: 1 | Status: SHIPPED | OUTPATIENT
Start: 2020-08-20 | End: 2021-02-11

## 2020-08-20 RX ORDER — DULOXETIN HYDROCHLORIDE 60 MG/1
60 CAPSULE, DELAYED RELEASE ORAL DAILY
Qty: 90 CAPSULE | Refills: 1 | Status: SHIPPED | OUTPATIENT
Start: 2020-08-20 | End: 2021-04-19

## 2020-08-20 NOTE — TELEPHONE ENCOUNTER
Lab Results   Component Value Date    CHOL 227 (H) 05/01/2020    TRIG 60 05/01/2020    HDL 56 05/01/2020     (H) 05/01/2020     The 10-year ASCVD risk score (Verena PINZON Jr., et al., 2013) is: 3.6%    Values used to calculate the score:      Age: 60 years      Sex: Female      Is Non- : No      Diabetic: No      Tobacco smoker: No      Systolic Blood Pressure: 122 mmHg      Is BP treated: No      HDL Cholesterol: 56 mg/dL      Total Cholesterol: 227 mg/dL

## 2020-08-27 ENCOUNTER — HOSPITAL ENCOUNTER (OUTPATIENT)
Dept: MAMMOGRAPHY | Facility: HOSPITAL | Age: 60
Discharge: HOME | End: 2020-08-27
Admitting: FAMILY MEDICINE

## 2020-08-27 ENCOUNTER — HOSPITAL ENCOUNTER (OUTPATIENT)
Dept: ULTRASOUND IMAGING | Facility: HOSPITAL | Age: 60
Discharge: HOME | End: 2020-08-27

## 2020-08-27 DIAGNOSIS — R92.0 ABNORMAL MAMMOGRAM WITH MICROCALCIFICATION: ICD-10-CM

## 2020-08-27 DIAGNOSIS — R92.0 BREAST MICROCALCIFICATIONS: Primary | ICD-10-CM

## 2020-08-27 PROCEDURE — G0279 TOMOSYNTHESIS, MAMMO: HCPCS

## 2020-08-27 PROCEDURE — 77066 DX MAMMO INCL CAD BI: CPT

## 2020-08-27 PROCEDURE — 76642 ULTRASOUND BREAST LIMITED: CPT

## 2020-08-28 ENCOUNTER — OFFICE VISIT (OUTPATIENT)
Dept: FAMILY MEDICINE CLINIC | Facility: CLINIC | Age: 60
End: 2020-08-28

## 2020-08-28 VITALS
WEIGHT: 119 LBS | TEMPERATURE: 96.9 F | SYSTOLIC BLOOD PRESSURE: 130 MMHG | HEART RATE: 89 BPM | OXYGEN SATURATION: 90 % | BODY MASS INDEX: 21.09 KG/M2 | DIASTOLIC BLOOD PRESSURE: 69 MMHG | HEIGHT: 63 IN

## 2020-08-28 DIAGNOSIS — R92.0 MICROCALCIFICATION OF LEFT BREAST ON MAMMOGRAM: Primary | ICD-10-CM

## 2020-08-28 PROCEDURE — 99213 OFFICE O/P EST LOW 20 MIN: CPT | Performed by: FAMILY MEDICINE

## 2020-09-02 ENCOUNTER — TELEPHONE (OUTPATIENT)
Dept: FAMILY MEDICINE CLINIC | Facility: CLINIC | Age: 60
End: 2020-09-02

## 2020-09-02 NOTE — TELEPHONE ENCOUNTER
Please call Sherry from scheduling and let her know I do not have a problem with patient having the stereotactic biopsy.  I had ordered an MRI because patient was not convinced a biopsy was necessary.  I would like to have the MRI on standby in the event that we need additional imaging.

## 2020-09-02 NOTE — TELEPHONE ENCOUNTER
Sherry from breast center called, she has scheduled pt for her sterotactic biopsy. She noticed there is an order for breast MRI. Pt told Sherry you had discussed doing MRI instead of biopsy but pt chose to schedule biopsy. Sherry didn't know if you want to cancel MRI or wait to see if this is something she will need MRI for. Appt is 9/18 for sterotactic breast biopsy

## 2020-09-11 DIAGNOSIS — E55.9 VITAMIN D DEFICIENCY: ICD-10-CM

## 2020-09-11 RX ORDER — ERGOCALCIFEROL 1.25 MG/1
50000 CAPSULE ORAL WEEKLY
Qty: 12 CAPSULE | Refills: 1 | Status: SHIPPED | OUTPATIENT
Start: 2020-09-11 | End: 2021-02-24

## 2020-09-18 ENCOUNTER — HOSPITAL ENCOUNTER (OUTPATIENT)
Dept: MAMMOGRAPHY | Facility: HOSPITAL | Age: 60
Discharge: HOME | End: 2020-09-18
Admitting: FAMILY MEDICINE

## 2020-09-18 ENCOUNTER — TELEPHONE (OUTPATIENT)
Dept: FAMILY MEDICINE CLINIC | Facility: CLINIC | Age: 60
End: 2020-09-18

## 2020-09-18 DIAGNOSIS — R92.0 BREAST MICROCALCIFICATIONS: ICD-10-CM

## 2020-09-18 PROCEDURE — 88305 TISSUE EXAM BY PATHOLOGIST: CPT | Performed by: FAMILY MEDICINE

## 2020-09-18 PROCEDURE — 25010000003 LIDOCAINE 1 % SOLUTION: Performed by: FAMILY MEDICINE

## 2020-09-18 RX ORDER — LIDOCAINE HYDROCHLORIDE 10 MG/ML
2.5 INJECTION, SOLUTION INFILTRATION; PERINEURAL ONCE
Status: COMPLETED | OUTPATIENT
Start: 2020-09-18 | End: 2020-09-18

## 2020-09-18 RX ORDER — LIDOCAINE HYDROCHLORIDE AND EPINEPHRINE 10; 10 MG/ML; UG/ML
10 INJECTION, SOLUTION INFILTRATION; PERINEURAL ONCE
Status: COMPLETED | OUTPATIENT
Start: 2020-09-18 | End: 2020-09-18

## 2020-09-18 RX ADMIN — LIDOCAINE HYDROCHLORIDE,EPINEPHRINE BITARTRATE 5 ML: 10; .01 INJECTION, SOLUTION INFILTRATION; PERINEURAL at 08:44

## 2020-09-18 RX ADMIN — LIDOCAINE HYDROCHLORIDE 2 ML: 10 INJECTION, SOLUTION INFILTRATION; PERINEURAL at 08:44

## 2020-09-18 NOTE — TELEPHONE ENCOUNTER
She just had a diagnostic mammogram in July and a biopsy in Aug  Why is she having another diagnostic mammogram

## 2020-09-18 NOTE — TELEPHONE ENCOUNTER
Spoke with pt. She is going there for a second opinion. She does not need order as of now. She will call them to verify what her appt is for. You can disregard.

## 2020-09-18 NOTE — TELEPHONE ENCOUNTER
Estefani with [facility]- breast department called, needing an order for Diagnostic left breast mammogram with ultrasound. Pt coming in on Tuesday.   Fax [phone]

## 2020-09-21 LAB
LAB AP CASE REPORT: NORMAL
PATH REPORT.FINAL DX SPEC: NORMAL
PATH REPORT.GROSS SPEC: NORMAL

## 2020-09-23 ENCOUNTER — TELEPHONE (OUTPATIENT)
Dept: FAMILY MEDICINE CLINIC | Facility: CLINIC | Age: 60
End: 2020-09-23

## 2020-09-23 NOTE — TELEPHONE ENCOUNTER
Pt is asking for biopsy results of left breast. Had this done Friday at [facility].

## 2020-09-23 NOTE — TELEPHONE ENCOUNTER
Please call patient and let her know that biopsy revealed benign breast tissue with no evidence of malignancy.

## 2020-09-23 NOTE — TELEPHONE ENCOUNTER
Please call patient and let her know that I would recommend repeat breast cancer screening in August 2021.

## 2020-10-15 ENCOUNTER — HOSPITAL ENCOUNTER (EMERGENCY)
Facility: HOSPITAL | Age: 60
Discharge: HOME | End: 2020-10-15
Admitting: EMERGENCY MEDICINE

## 2020-10-15 ENCOUNTER — APPOINTMENT (OUTPATIENT)
Dept: GENERAL RADIOLOGY | Facility: HOSPITAL | Age: 60
End: 2020-10-15

## 2020-10-15 VITALS
HEIGHT: 66 IN | SYSTOLIC BLOOD PRESSURE: 128 MMHG | HEART RATE: 94 BPM | TEMPERATURE: 98.4 F | OXYGEN SATURATION: 98 % | RESPIRATION RATE: 18 BRPM | BODY MASS INDEX: 18.67 KG/M2 | DIASTOLIC BLOOD PRESSURE: 68 MMHG | WEIGHT: 116.18 LBS

## 2020-10-15 DIAGNOSIS — R50.9 FEVER IN ADULT: Primary | ICD-10-CM

## 2020-10-15 DIAGNOSIS — Z03.818 ENCNTR FOR OBS FOR SUSP EXPSR TO OTH BIOLG AGENTS RULED OUT: ICD-10-CM

## 2020-10-15 DIAGNOSIS — J18.9 PNEUMONIA OF RIGHT MIDDLE LOBE DUE TO INFECTIOUS ORGANISM: ICD-10-CM

## 2020-10-15 LAB

## 2020-10-15 PROCEDURE — 0202U NFCT DS 22 TRGT SARS-COV-2: CPT | Performed by: NURSE PRACTITIONER

## 2020-10-15 PROCEDURE — 71046 X-RAY EXAM CHEST 2 VIEWS: CPT

## 2020-10-15 PROCEDURE — 99283 EMERGENCY DEPT VISIT LOW MDM: CPT

## 2020-10-15 RX ORDER — ACETAMINOPHEN 500 MG
1000 TABLET ORAL ONCE
Status: COMPLETED | OUTPATIENT
Start: 2020-10-15 | End: 2020-10-15

## 2020-10-15 RX ORDER — BENZONATATE 200 MG/1
200 CAPSULE ORAL 3 TIMES DAILY PRN
Qty: 15 CAPSULE | Refills: 0 | Status: SHIPPED | OUTPATIENT
Start: 2020-10-15 | End: 2020-11-06

## 2020-10-15 RX ORDER — DOXYCYCLINE HYCLATE 100 MG
100 TABLET ORAL 2 TIMES DAILY
Qty: 20 TABLET | Refills: 0 | Status: SHIPPED | OUTPATIENT
Start: 2020-10-15 | End: 2020-10-25

## 2020-10-15 RX ADMIN — ACETAMINOPHEN 1000 MG: 500 TABLET ORAL at 02:14

## 2020-10-15 NOTE — ED PROVIDER NOTES
Subjective   Patient is a 60-year-old white female with history of high cholesterol who presents today with complaints of low-grade fever, nasal congestion and slight cough.  She states her symptoms started 2 days ago.  She states her fevers controlled with Tylenol.  She reports her cough to be mostly nonproductive.  She denies any chest pain or shortness of breath.  She denies any abdominal pain, nausea vomiting or diarrhea.  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  She denies any other ill contacts or recent travel.          Review of Systems   Constitutional: Positive for chills and fever.   HENT: Positive for congestion. Negative for rhinorrhea and sore throat.    Respiratory: Positive for cough. Negative for shortness of breath.    Cardiovascular: Negative for chest pain.   Gastrointestinal: Negative for abdominal pain, diarrhea, nausea and vomiting.       Past Medical History:   Diagnosis Date   • Hyperlipidemia        Allergies   Allergen Reactions   • Sulfa Antibiotics Hives       Past Surgical History:   Procedure Laterality Date   • APPENDECTOMY     •  SECTION  , ,    • EXCISION BAKERS CYST KNEE Left    • POSTPARTUM TUBAL LIGATION     • TONSILLECTOMY         Family History   Problem Relation Age of Onset   • Colon cancer Father    • Colon cancer Maternal Grandmother    • Colon cancer Maternal Grandfather    • Colon cancer Paternal Grandfather        Social History     Socioeconomic History   • Marital status:      Spouse name: Not on file   • Number of children: Not on file   • Years of education: Not on file   • Highest education level: Not on file   Tobacco Use   • Smoking status: Former Smoker     Quit date: 2011     Years since quittin.3   • Smokeless tobacco: Never Used   Substance and Sexual Activity   • Alcohol use: Not Currently     Frequency: Never   • Drug use: Never           Objective   Physical  Exam  Vital signs and triage nurse note reviewed.  Constitutional: Awake, alert; well-developed and well-nourished. No acute distress is noted.  HEENT: Normocephalic, atraumatic; pupils are PERRL with intact EOM; oropharynx is pink and moist without exudate or erythema.  No drooling or pooling of oral secretions.  Neck: Supple, full range of motion without pain; no cervical lymphadenopathy. Normal phonation.  Cardiovascular: Regular rate and rhythm, normal S1-S2.  No murmur noted.  Pulmonary: Respiratory effort regular nonlabored, breath sounds clear to auscultation all fields.  Abdomen: Soft, nontender, nondistended with normoactive bowel sounds; no rebound or guarding.  Musculoskeletal: Independent range of motion of all extremities with no palpable tenderness or edema.  Neuro: Alert oriented x3, speech is clear and appropriate, GCS 15.    Skin: Flesh tone, warm, dry, intact; no erythematous or petechial rash or lesion.      Procedures           ED Course      Labs Reviewed   RESPIRATORY PANEL PCR W/ COVID-19 (SARS-COV-2) LAURA/AARTI/ITA/PAD/[facility] IN-HOUSE, NP SWAB IN [facility], 3-4 HR TAT - Normal    Narrative:     Fact sheet for providers: https://docs.[employer]/wp-content/uploads/XKB1480-4985-LQ7.1-EUA-Provider-Fact-Sheet-3.pdf    Fact sheet for patients: https://docs.[employer]/wp-content/uploads/UJE7523-7854-AY8.1-EUA-Patient-Fact-Sheet-1.pdf   COVID PRE-OP / PRE-PROCEDURE SCREENING ORDER (NO ISOLATION)    Narrative:     The following orders were created for panel order COVID PRE-OP / PRE-PROCEDURE SCREENING ORDER (NO ISOLATION) - Swab, Nasopharynx.  Procedure                               Abnormality         Status                     ---------                               -----------         ------                     Respiratory Panel PCR w/...[[id number]]  Normal              Final result                 Please view results for these tests on the individual orders.     No radiology results for the last  day  Medications   acetaminophen (TYLENOL) tablet 1,000 mg (1,000 mg Oral Given 10/15/20 0214)                                          MDM  Number of Diagnoses or Management Options  Encntr for obs for susp expsr to oth biolg agents ruled out:   Fever in adult:   Pneumonia of right middle lobe due to infectious organism:   Diagnosis management comments: Comorbidities: High cholesterol  Differentials: COVID, viral illness, allergic rhinitis;this list is not all inclusive and does not constitute the entirety of considered causes  Discussion with provider:  Radiology interpretation: X-rays reviewed by me and interpreted by radiologist: Not warranted  Lab interpretation: Labs viewed by me significant for: As above    Patient had nasal swab obtained found to be negative for COVID or respiratory virus.  Patient also had chest x-ray obtained was found to have right-sided pneumonia.  Vital sign recheck reveals a heart rate of 108 and an O2 saturation of 95% on room air.  She is in no acute distress.  She is in no respiratory distress.  She denies any chest pain or shortness of breath.  She is well-appearing.    Diagnosis and treatment plan discussed with patient.  Patient agreeable to plan.   I discussed findings with patient who voices understanding of discharge instructions, signs and symptoms requiring return to ED; discharged improved and in stable condition with follow up for re-evaluation.  Prescriptions for doxycycline and Tessalon.       Amount and/or Complexity of Data Reviewed  Clinical lab tests: ordered and reviewed  Tests in the radiology section of CPT®: ordered and reviewed    Patient Progress  Patient progress: stable      Final diagnoses:   Fever in adult   Pneumonia of right middle lobe due to infectious organism   Encntr for obs for susp expsr to oth biolg agents ruled out            Dianne Kulkarni APRN  10/15/20 0350       Dionisio Song MD  10/15/20 0248

## 2020-10-15 NOTE — DISCHARGE INSTRUCTIONS
Take medication as prescribed.  Drink plenty of fluids.  May continue Tylenol for your fever.  Close follow-up with your primary care provider to ensure resolution of your chest x-ray.  Return for new or worsening symptoms.

## 2020-11-05 DIAGNOSIS — J45.40 MODERATE PERSISTENT REACTIVE AIRWAY DISEASE WITHOUT COMPLICATION: ICD-10-CM

## 2020-11-06 ENCOUNTER — OFFICE VISIT (OUTPATIENT)
Dept: FAMILY MEDICINE CLINIC | Facility: CLINIC | Age: 60
End: 2020-11-06

## 2020-11-06 VITALS
OXYGEN SATURATION: 97 % | DIASTOLIC BLOOD PRESSURE: 74 MMHG | TEMPERATURE: 97.7 F | WEIGHT: 117 LBS | BODY MASS INDEX: 19.49 KG/M2 | HEART RATE: 113 BPM | HEIGHT: 65 IN | SYSTOLIC BLOOD PRESSURE: 121 MMHG

## 2020-11-06 DIAGNOSIS — Z00.00 ANNUAL PHYSICAL EXAM: ICD-10-CM

## 2020-11-06 DIAGNOSIS — Z23 ENCOUNTER FOR IMMUNIZATION: ICD-10-CM

## 2020-11-06 DIAGNOSIS — J44.9 CHRONIC OBSTRUCTIVE PULMONARY DISEASE, UNSPECIFIED COPD TYPE (HCC): Primary | ICD-10-CM

## 2020-11-06 DIAGNOSIS — E78.2 MIXED HYPERLIPIDEMIA: ICD-10-CM

## 2020-11-06 DIAGNOSIS — F41.8 MIXED ANXIETY DEPRESSIVE DISORDER: ICD-10-CM

## 2020-11-06 PROCEDURE — 90471 IMMUNIZATION ADMIN: CPT | Performed by: FAMILY MEDICINE

## 2020-11-06 PROCEDURE — 90732 PPSV23 VACC 2 YRS+ SUBQ/IM: CPT | Performed by: FAMILY MEDICINE

## 2020-11-06 PROCEDURE — 99396 PREV VISIT EST AGE 40-64: CPT | Performed by: FAMILY MEDICINE

## 2020-11-06 RX ORDER — ALBUTEROL SULFATE 90 UG/1
2 AEROSOL, METERED RESPIRATORY (INHALATION) EVERY 6 HOURS PRN
Qty: 18 G | Refills: 3 | Status: SHIPPED | OUTPATIENT
Start: 2020-11-06 | End: 2021-03-08

## 2020-11-06 NOTE — PROGRESS NOTES
Subjective:     Justa Coles is a 60 y.o. female who presents for  Chief Complaint   Patient presents with   • Annual Exam     annual physical exam - declined pap smear- will see gyn for pap    • Med Refill     needs proair refilled       This is a known patient to me.     HPI  Patient presents for an annual physical exam.    Diet: generally unhealthy; meals are primarily snacks  Exercise: walking related to work  Dentist: greater than a year ago; dentures    Seatbelt Use: regular    Breast Cancer Screening: Up to date.   Cervical Cancer Screening: Due. Patient to follow up with OB-GYN.  Colon Cancer Screening: Not up to date. Referral placed.     Patient is a former smoker with a concurrent medical history of COPD.  Reports compliance with Breo.  Requiring rare use of emergency inhaler.  Of note patient was seen in the ER in mid 2020 for lower lobe pneumonia.  Patient has since completed antibiotic therapy.    Patient was also concurrent medical history of anxiety and depression exacerbated by family stressors.  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  Previously treated with Cymbalta 30 mg.  Patient reported no efficacy with 30 mg dosage.  Has not been compliant with Cymbalta 60 mg.  Concerned with oversedation and potential interference with her work.  Advised to take medication in the evening.    Past Medical Hx:  Past Medical History:   Diagnosis Date   • Hyperlipidemia        Past Surgical Hx:  Past Surgical History:   Procedure Laterality Date   • APPENDECTOMY     •  SECTION  , ,    • EXCISION BAKERS CYST KNEE Left    • POSTPARTUM TUBAL LIGATION     • TONSILLECTOMY         Family Hx:  Family History   Problem Relation Age of Onset   • Colon cancer Father    • Colon cancer Maternal Grandmother    • Colon cancer Maternal Grandfather    • Colon cancer Paternal Grandfather         Social History:  Social History     Socioeconomic History   •  Marital status:      Spouse name: Not on file   • Number of children: Not on file   • Years of education: Not on file   • Highest education level: Not on file   Tobacco Use   • Smoking status: Former Smoker     Quit date: 2011     Years since quittin.4   • Smokeless tobacco: Never Used   Substance and Sexual Activity   • Alcohol use: Not Currently     Frequency: Never   • Drug use: Never       Allergies:  Sulfa antibiotics    Current Meds:    Current Outpatient Medications:   •  albuterol sulfate HFA (PROAIR HFA) 108 (90 Base) MCG/ACT inhaler, PROAIR  (90 Base) MCG/ACT AERS, Disp: , Rfl:   •  atorvastatin (LIPITOR) 10 MG tablet, Take 1 tablet by mouth Every Night., Disp: 90 tablet, Rfl: 1  •  DULoxetine (CYMBALTA) 60 MG capsule, Take 1 capsule by mouth Daily., Disp: 90 capsule, Rfl: 1  •  Fluticasone Furoate-Vilanterol (Breo Ellipta) 200-25 MCG/INH inhaler, Inhale 1 puff Daily. Rinse mouth after each use., Disp: 60 each, Rfl: 5  •  vitamin D (ERGOCALCIFEROL) 1.25 MG (18448 UT) capsule capsule, Take 1 capsule by mouth 1 (One) Time Per Week., Disp: 12 capsule, Rfl: 1    The following portions of the patient's history were reviewed and updated as appropriate: allergies, current medications, past family history, past medical history, past social history, past surgical history and problem list.    Review of Systems  Review of Systems   Constitutional: Negative for chills, diaphoresis, fatigue and fever.   HENT: Negative for congestion, rhinorrhea, sinus pressure, sneezing and sore throat.    Eyes: Negative for blurred vision, double vision and redness.   Respiratory: Positive for cough. Negative for shortness of breath and wheezing.    Cardiovascular: Negative for chest pain and leg swelling.   Gastrointestinal: Positive for abdominal pain (RUQ; injury at work). Negative for constipation, diarrhea, nausea and vomiting.   Genitourinary: Negative for difficulty urinating, dysuria and hematuria.  "  Musculoskeletal: Negative for arthralgias, gait problem and myalgias.   Skin: Negative for rash and skin lesions.   Neurological: Negative for tremors, seizures, syncope and headache.   Psychiatric/Behavioral: Positive for stress. Negative for sleep disturbance and depressed mood. The patient is not nervous/anxious.        Objective:     /74 (BP Location: Left arm, Patient Position: Sitting, Cuff Size: Small Adult)   Pulse 113   Temp 97.7 °F (36.5 °C) (Infrared)   Ht 165.1 cm (65\")   Wt 53.1 kg (117 lb)   SpO2 97%   BMI 19.47 kg/m²     Physical Exam  Vitals signs reviewed.   Constitutional:       General: She is not in acute distress.     Appearance: She is well-developed. She is not diaphoretic.   HENT:      Head: Normocephalic and atraumatic.      Right Ear: Tympanic membrane and ear canal normal.      Left Ear: Ear canal normal. There is impacted cerumen.      Nose: Nose normal.      Mouth/Throat:      Mouth: Mucous membranes are moist.      Dentition: Has dentures.      Pharynx: Oropharynx is clear.   Eyes:      General: No scleral icterus.     Conjunctiva/sclera: Conjunctivae normal.      Pupils: Pupils are equal, round, and reactive to light.   Neck:      Musculoskeletal: Normal range of motion.   Cardiovascular:      Rate and Rhythm: Normal rate and regular rhythm.      Heart sounds: Normal heart sounds.   Pulmonary:      Effort: Pulmonary effort is normal.      Breath sounds: Normal breath sounds.   Abdominal:      General: Bowel sounds are normal.      Palpations: Abdomen is soft.      Tenderness: There is abdominal tenderness in the right upper quadrant.   Skin:     General: Skin is warm and dry.      Capillary Refill: Capillary refill takes less than 2 seconds.      Findings: No rash.   Neurological:      Mental Status: She is alert and oriented to person, place, and time.      Deep Tendon Reflexes:      Reflex Scores:       Patellar reflexes are 2+ on the right side and 2+ on the left " side.  Psychiatric:         Mood and Affect: Mood normal.         Behavior: Behavior is agitated.         Lab Results   Component Value Date    WBC 9.28 05/01/2020    HGB 14.3 05/01/2020    HCT 42.5 05/01/2020    MCV 86.7 05/01/2020     05/01/2020     Lab Results   Component Value Date    GLUCOSE 92 05/01/2020    BUN 16 05/01/2020    CREATININE 0.76 05/01/2020    EGFRIFNONA 77 05/01/2020    BCR 21.1 05/01/2020    K 4.4 05/01/2020    CO2 26.7 05/01/2020    CALCIUM 9.5 05/01/2020    ALBUMIN 4.20 05/01/2020    LABIL2 1.2 01/19/2019    AST 16 05/01/2020    ALT 10 05/01/2020     Lab Results   Component Value Date    CHOL 227 (H) 05/01/2020    TRIG 60 05/01/2020    HDL 56 05/01/2020     (H) 05/01/2020     The 10-year ASCVD risk score (Verena PINZON Jr., et al., 2013) is: 3.6%    Values used to calculate the score:      Age: 60 years      Sex: Female      Is Non- : No      Diabetic: No      Tobacco smoker: No      Systolic Blood Pressure: 121 mmHg      Is BP treated: No      HDL Cholesterol: 56 mg/dL      Total Cholesterol: 227 mg/dL      Assessment/Plan:     Diagnoses and all orders for this visit:    1. Chronic obstructive pulmonary disease, unspecified COPD type (CMS/[facility]) (Primary)  Comments:  Former smoker.  Continue Breo daily and as needed albuterol.  Recommended pneumococcal vaccination.  Orders:  -     albuterol sulfate HFA (ProAir HFA) 108 (90 Base) MCG/ACT inhaler; Inhale 2 puffs Every 6 (Six) Hours As Needed for Wheezing or Shortness of Air.  Dispense: 18 g; Refill: 3  -     Pneumococcal Polysaccharide Vaccine 23-Valent Greater Than or Equal To 3yo Subcutaneous / IM    2. Mixed hyperlipidemia  Comments:  Reviewed lipid panel & 10-year ASCVD risk score.  Encouraged a diet rich in vegetables & 150 minutes of exercise weekly.  Continue atorvastatin 10 mg.    3. Mixed anxiety depressive disorder  Comments:  Poorly controlled.  Intermittent compliance with Cymbalta 60 mg due to  concerns of potential side effects.  Encouraged compliance.    4. Encounter for immunization  -     Pneumococcal Polysaccharide Vaccine 23-Valent Greater Than or Equal To 3yo Subcutaneous / IM    5. Annual physical exam  -     Pneumococcal Polysaccharide Vaccine 23-Valent Greater Than or Equal To 3yo Subcutaneous / IM    Encouraged 150 minutes of moderate intensity activity weekly.   Encouraged regular dental visits.   Encouraged regular seat belt use.   Encouraged safe sex practices.   Patient provided with educational material regarding preventive health care in AVS.    Follow-up:     Return in about 6 months (around 5/6/2021) for Recheck COPD and anxiety.      Signature    Maria De Jesus Justin MD  Family Medicine  [facility]        This document has been electronically signed by Maria De Jesus Justin MD on November 6, 2020 09:29 EST

## 2020-11-06 NOTE — PATIENT INSTRUCTIONS
Preventive Care 40-64 Years Old, Female  Preventive care refers to visits with your health care provider and lifestyle choices that can promote health and wellness. This includes:  · A yearly physical exam. This may also be called an annual well check.  · Regular dental visits and eye exams.  · Immunizations.  · Screening for certain conditions.  · Healthy lifestyle choices, such as eating a healthy diet, getting regular exercise, not using drugs or products that contain nicotine and tobacco, and limiting alcohol use.  What can I expect for my preventive care visit?  Physical exam  Your health care provider will check your:  · Height and weight. This may be used to calculate body mass index (BMI), which tells if you are at a healthy weight.  · Heart rate and blood pressure.  · Skin for abnormal spots.  Counseling  Your health care provider may ask you questions about your:  · Alcohol, tobacco, and drug use.  · Emotional well-being.  · Home and relationship well-being.  · Sexual activity.  · Eating habits.  · Work and work environment.  · Method of birth control.  · Menstrual cycle.  · Pregnancy history.  What immunizations do I need?    Influenza (flu) vaccine  · This is recommended every year.  Tetanus, diphtheria, and pertussis (Tdap) vaccine  · You may need a Td booster every 10 years.  Varicella (chickenpox) vaccine  · You may need this if you have not been vaccinated.  Zoster (shingles) vaccine  · You may need this after age 60.  Measles, mumps, and rubella (MMR) vaccine  · You may need at least one dose of MMR if you were born in 1957 or later. You may also need a second dose.  Pneumococcal conjugate (PCV13) vaccine  · You may need this if you have certain conditions and were not previously vaccinated.  Pneumococcal polysaccharide (PPSV23) vaccine  · You may need one or two doses if you smoke cigarettes or if you have certain conditions.  Meningococcal conjugate (MenACWY) vaccine  · You may need this if you  have certain conditions.  Hepatitis A vaccine  · You may need this if you have certain conditions or if you travel or work in places where you may be exposed to hepatitis A.  Hepatitis B vaccine  · You may need this if you have certain conditions or if you travel or work in places where you may be exposed to hepatitis B.  Haemophilus influenzae type b (Hib) vaccine  · You may need this if you have certain conditions.  Human papillomavirus (HPV) vaccine  · If recommended by your health care provider, you may need three doses over 6 months.  You may receive vaccines as individual doses or as more than one vaccine together in one shot (combination vaccines). Talk with your health care provider about the risks and benefits of combination vaccines.  What tests do I need?  Blood tests  · Lipid and cholesterol levels. These may be checked every 5 years, or more frequently if you are over 50 years old.  · Hepatitis C test.  · Hepatitis B test.  Screening  · Lung cancer screening. You may have this screening every year starting at age 55 if you have a 30-pack-year history of smoking and currently smoke or have quit within the past 15 years.  · Colorectal cancer screening. All adults should have this screening starting at age 50 and continuing until age 75. Your health care provider may recommend screening at age 45 if you are at increased risk. You will have tests every 1-10 years, depending on your results and the type of screening test.  · Diabetes screening. This is done by checking your blood sugar (glucose) after you have not eaten for a while (fasting). You may have this done every 1-3 years.  · Mammogram. This may be done every 1-2 years. Talk with your health care provider about when you should start having regular mammograms. This may depend on whether you have a family history of breast cancer.  · BRCA-related cancer screening. This may be done if you have a family history of breast, ovarian, tubal, or peritoneal  cancers.  · Pelvic exam and Pap test. This may be done every 3 years starting at age 21. Starting at age 30, this may be done every 5 years if you have a Pap test in combination with an HPV test.  Other tests  · Sexually transmitted disease (STD) testing.  · Bone density scan. This is done to screen for osteoporosis. You may have this scan if you are at high risk for osteoporosis.  Follow these instructions at home:  Eating and drinking  · Eat a diet that includes fresh fruits and vegetables, whole grains, lean protein, and low-fat dairy.  · Take vitamin and mineral supplements as recommended by your health care provider.  · Do not drink alcohol if:  ? Your health care provider tells you not to drink.  ? You are pregnant, may be pregnant, or are planning to become pregnant.  · If you drink alcohol:  ? Limit how much you have to 0-1 drink a day.  ? Be aware of how much alcohol is in your drink. In the [place] one drink equals one 12 oz bottle of beer (355 mL), one 5 oz glass of wine (148 mL), or one 1½ oz glass of hard liquor (44 mL).  Lifestyle  · Take daily care of your teeth and gums.  · Stay active. Exercise for at least 30 minutes on 5 or more days each week.  · Do not use any products that contain nicotine or tobacco, such as cigarettes, e-cigarettes, and chewing tobacco. If you need help quitting, ask your health care provider.  · If you are sexually active, practice safe sex. Use a condom or other form of birth control (contraception) in order to prevent pregnancy and STIs (sexually transmitted infections).  · If told by your health care provider, take low-dose aspirin daily starting at age 50.  What's next?  · Visit your health care provider once a year for a well check visit.  · Ask your health care provider how often you should have your eyes and teeth checked.  · Stay up to date on all vaccines.  This information is not intended to replace advice given to you by your health care provider. Make sure you  discuss any questions you have with your health care provider.  Document Released: 01/13/2017 Document Revised: 08/29/2019 Document Reviewed: 08/29/2019  Elsevier Patient Education © 2020 [employer]      Mediterranean Diet  A Mediterranean diet refers to food and lifestyle choices that are based on the traditions of countries located on the [place]. This way of eating has been shown to help prevent certain conditions and improve outcomes for people who have chronic diseases, like kidney disease and heart disease.  What are tips for following this plan?  Lifestyle  · Cook and eat meals together with your family, when possible.  · Drink enough fluid to keep your urine clear or pale yellow.  · Be physically active every day. This includes:  ? Aerobic exercise like running or swimming.  ? Leisure activities like gardening, walking, or housework.  · Get 7-8 hours of sleep each night.  · If recommended by your health care provider, drink red wine in moderation. This means 1 glass a day for nonpregnant women and 2 glasses a day for men. A glass of wine equals 5 oz (150 mL).  Reading food labels    · Check the serving size of packaged foods. For foods such as rice and pasta, the serving size refers to the amount of cooked product, not dry.  · Check the total fat in packaged foods. Avoid foods that have saturated fat or trans fats.  · Check the ingredients list for added sugars, such as corn syrup.  Shopping  · At the grocery store, buy most of your food from the areas near the walls of the store. This includes:  ? Fresh fruits and vegetables (produce).  ? Grains, beans, nuts, and seeds. Some of these may be available in unpackaged forms or large amounts (in bulk).  ? Fresh seafood.  ? Poultry and eggs.  ? Low-fat dairy products.  · Buy whole ingredients instead of prepackaged foods.  · Buy fresh fruits and vegetables in-season from local farmers markets.  · Buy frozen fruits and vegetables in resealable  bags.  · If you do not have access to quality fresh seafood, buy precooked frozen shrimp or canned fish, such as tuna, salmon, or sardines.  · Buy small amounts of raw or cooked vegetables, salads, or olives from the deli or salad bar at your store.  · Stock your pantry so you always have certain foods on hand, such as olive oil, canned tuna, canned tomatoes, rice, pasta, and beans.  Cooking  · Cook foods with extra-virgin olive oil instead of using butter or other vegetable oils.  · Have meat as a side dish, and have vegetables or grains as your main dish. This means having meat in small portions or adding small amounts of meat to foods like pasta or stew.  · Use beans or vegetables instead of meat in common dishes like chili or lasagna.  · [place] with different cooking methods. Try roasting or broiling vegetables instead of steaming or sautéeing them.  · Add frozen vegetables to soups, stews, pasta, or rice.  · Add nuts or seeds for added healthy fat at each meal. You can add these to yogurt, salads, or vegetable dishes.  · Marinate fish or vegetables using olive oil, lemon juice, garlic, and fresh herbs.  Meal planning    · Plan to eat 1 vegetarian meal one day each week. Try to work up to 2 vegetarian meals, if possible.  · Eat seafood 2 or more times a week.  · Have healthy snacks readily available, such as:  ? Vegetable sticks with hummus.  ? Greek yogurt.  ? Fruit and nut trail mix.  · Eat balanced meals throughout the week. This includes:  ? Fruit: 2-3 servings a day  ? Vegetables: 4-5 servings a day  ? Low-fat dairy: 2 servings a day  ? Fish, poultry, or lean meat: 1 serving a day  ? Beans and legumes: 2 or more servings a week  ? Nuts and seeds: 1-2 servings a day  ? Whole grains: 6-8 servings a day  ? Extra-virgin olive oil: 3-4 servings a day  · Limit red meat and sweets to only a few servings a month  What are my food choices?  · Mediterranean diet  ? Recommended  § Grains: Whole-grain pasta. Brown  rice. Bulgar wheat. Polenta. Couscous. Whole-wheat bread. Oatmeal. Quinoa.  § Vegetables: Artichokes. Beets. Broccoli. Cabbage. Carrots. Eggplant. Green beans. Chard. Kale. Spinach. Onions. Leeks. Peas. Squash. Tomatoes. Peppers. Radishes.  § Fruits: Apples. Apricots. Avocado. Berries. Bananas. Cherries. Dates. Figs. Grapes. Estrella. Melon. Oranges. Peaches. Plums. Pomegranate.  § Meats and other protein foods: Beans. Almonds. Sunflower seeds. Pine nuts. Peanuts. Cod. [place]. Scallops. Shrimp. Tuna. Tilapia. Clams. Oysters. Eggs.  § Dairy: Low-fat milk. Cheese. Greek yogurt.  § Beverages: Water. Red wine. Herbal tea.  § Fats and oils: Extra virgin olive oil. Avocado oil. Grape seed oil.  § Sweets and desserts: Greek yogurt with honey. Baked apples. Poached pears. Trail mix.  § Seasoning and other foods: Basil. Cilantro. Coriander. Cumin. Mint. Parsley. [place]. Rosemary. Tarragon. Garlic. Oregano. Thyme. Pepper. Balsalmic vinegar. Tahini. Hummus. Tomato sauce. Olives. Mushrooms.  ? Limit these  § Grains: Prepackaged pasta or rice dishes. Prepackaged cereal with added sugar.  § Vegetables: Deep fried potatoes (french fries).  § Fruits: Fruit canned in syrup.  § Meats and other protein foods: Beef. Pork. Lamb. Poultry with skin. Hot dogs. [place].  § Dairy: Ice cream. Sour cream. Whole milk.  § Beverages: Juice. Sugar-sweetened soft drinks. Beer. Liquor and spirits.  § Fats and oils: Butter. Canola oil. Vegetable oil. Beef fat (tallow). Lard.  § Sweets and desserts: Cookies. Cakes. Pies. Candy.  § Seasoning and other foods: Mayonnaise. Premade sauces and marinades.  The items listed may not be a complete list. Talk with your dietitian about what dietary choices are right for you.  Summary  · The Mediterranean diet includes both food and lifestyle choices.  · Eat a variety of fresh fruits and vegetables, beans, nuts, seeds, and whole grains.  · Limit the amount of red meat and sweets that you eat.  · Talk with your health  care provider about whether it is safe for you to drink red wine in moderation. This means 1 glass a day for nonpregnant women and 2 glasses a day for men. A glass of wine equals 5 oz (150 mL).  This information is not intended to replace advice given to you by your health care provider. Make sure you discuss any questions you have with your health care provider.  Document Released: 08/10/2017 Document Revised: 08/17/2017 Document Reviewed: 08/10/2017  [employer] Patient Education © 2020 [employer]      Exercising to Stay Healthy  To become healthy and stay healthy, it is recommended that you do moderate-intensity and vigorous-intensity exercise. You can tell that you are exercising at a moderate intensity if your heart starts beating faster and you start breathing faster but can still hold a conversation. You can tell that you are exercising at a vigorous intensity if you are breathing much harder and faster and cannot hold a conversation while exercising.  Exercising regularly is important. It has many health benefits, such as:  · Improving overall fitness, flexibility, and endurance.  · Increasing bone density.  · Helping with weight control.  · Decreasing body fat.  · Increasing muscle strength.  · Reducing stress and tension.  · Improving overall health.  How often should I exercise?  Choose an activity that you enjoy, and set realistic goals. Your health care provider can help you make an activity plan that works for you.  Exercise regularly as told by your health care provider. This may include:  · Doing strength training two times a week, such as:  ? Lifting weights.  ? Using resistance bands.  ? Push-ups.  ? Sit-ups.  ? Yoga.  · Doing a certain intensity of exercise for a given amount of time. Choose from these options:  ? A total of 150 minutes of moderate-intensity exercise every week.  ? A total of 75 minutes of vigorous-intensity exercise every week.  ? A mix of moderate-intensity and  vigorous-intensity exercise every week.  Children, pregnant women, people who have not exercised regularly, people who are overweight, and older adults may need to talk with a health care provider about what activities are safe to do. If you have a medical condition, be sure to talk with your health care provider before you start a new exercise program.  What are some exercise ideas?  Moderate-intensity exercise ideas include:  · Walking 1 mile (1.6 km) in about 15 minutes.  · Biking.  · Hiking.  · Golfing.  · Dancing.  · Water aerobics.  Vigorous-intensity exercise ideas include:  · Walking 4.5 miles (7.2 km) or more in about 1 hour.  · Jogging or running 5 miles (8 km) in about 1 hour.  · Biking 10 miles (16.1 km) or more in about 1 hour.  · Lap swimming.  · Roller-skating or in-line skating.  · Cross-country skiing.  · Vigorous competitive sports, such as football, basketball, and soccer.  · Jumping rope.  · Aerobic dancing.  What are some everyday activities that can help me to get exercise?  · Yard work, such as:  ? Pushing a .  ? Raking and bagging leaves.  · Washing your car.  · Pushing a stroller.  · Shoveling snow.  · Gardening.  · Washing windows or floors.  How can I be more active in my day-to-day activities?  · Use stairs instead of an elevator.  · Take a walk during your lunch break.  · If you drive, park your car farther away from your work or school.  · If you take public transportation, get off one stop early and walk the rest of the way.  · Stand up or walk around during all of your indoor phone calls.  · Get up, stretch, and walk around every 30 minutes throughout the day.  · Enjoy exercise with a friend. Support to continue exercising will help you keep a regular routine of activity.  What guidelines can I follow while exercising?  · Before you start a new exercise program, talk with your health care provider.  · Do not exercise so much that you hurt yourself, feel dizzy, or get very  short of breath.  · Wear comfortable clothes and wear shoes with good support.  · Drink plenty of water while you exercise to prevent dehydration or heat stroke.  · Work out until your breathing and your heartbeat get faster.  Where to find more information  · [place] Department of Health and Human Services: www.hhs.gov  · Centers for Disease Control and Prevention (CDC): www.cdc.gov  Summary  · Exercising regularly is important. It will improve your overall fitness, flexibility, and endurance.  · Regular exercise also will improve your overall health. It can help you control your weight, reduce stress, and improve your bone density.  · Do not exercise so much that you hurt yourself, feel dizzy, or get very short of breath.  · Before you start a new exercise program, talk with your health care provider.  This information is not intended to replace advice given to you by your health care provider. Make sure you discuss any questions you have with your health care provider.  Document Released: 01/20/2012 Document Revised: 11/30/2018 Document Reviewed: 11/08/2018  [employer] Patient Education © 2020 [employer]      Living With Depression  Everyone experiences occasional disappointment, sadness, and loss in their lives. When you are feeling down, blue, or sad for at least 2 weeks in a row, it may mean that you have depression. Depression can affect your thoughts and feelings, relationships, daily activities, and physical health. It is caused by changes in the way your brain functions. If you receive a diagnosis of depression, your health care provider will tell you which type of depression you have and what treatment options are available to you.  If you are living with depression, there are ways to help you recover from it and also ways to prevent it from coming back.  How to cope with lifestyle changes  Coping with stress         Stress is your body’s reaction to life changes and events, both good and bad. Stressful  situations may include:  · Getting .  · The death of a spouse.  · Losing a job.  · Retiring.  · Having a baby.  Stress can last just a few hours or it can be ongoing. Stress can play a major role in depression, so it is important to learn both how to cope with stress and how to think about it differently.  Talk with your health care provider or a counselor if you would like to learn more about stress reduction. He or she may suggest some stress reduction techniques, such as:  · Music therapy. This can include creating music or listening to music. Choose music that you enjoy and that inspires you.  · Mindfulness-based meditation. This kind of meditation can be done while sitting or walking. It involves being aware of your normal breaths, rather than trying to control your breathing.  · Centering prayer. This is a kind of meditation that involves focusing on a spiritual word or phrase. Choose a word, phrase, or sacred image that is meaningful to you and that brings you peace.  · Deep breathing. To do this, expand your stomach and inhale slowly through your nose. Hold your breath for 3-5 seconds, then exhale slowly, allowing your stomach muscles to relax.  · Muscle relaxation. This involves intentionally tensing muscles then relaxing them.  Choose a stress reduction technique that fits your lifestyle and personality. Stress reduction techniques take time and practice to develop. Set aside 5-15 minutes a day to do them. Therapists can offer training in these techniques. The training may be covered by some insurance plans. Other things you can do to manage stress include:  · Keeping a stress diary. This can help you learn what triggers your stress and ways to control your response.  · Understanding what your limits are and saying no to requests or events that lead to a schedule that is too full.  · Thinking about how you respond to certain situations. You may not be able to control everything, but you can control  how you react.  · Adding humor to your life by watching funny films or TV shows.  · Making time for activities that help you relax and not feeling guilty about spending your time this way.    Medicines  Your health care provider may suggest certain medicines if he or she feels that they will help improve your condition. Avoid using alcohol and other substances that may prevent your medicines from working properly (may interact). It is also important to:  · Talk with your pharmacist or health care provider about all the medicines that you take, their possible side effects, and what medicines are safe to take together.  · Make it your goal to take part in all treatment decisions (shared decision-making). This includes giving input on the side effects of medicines. It is best if shared decision-making with your health care provider is part of your total treatment plan.  If your health care provider prescribes a medicine, you may not notice the full benefits of it for 4-8 weeks. Most people who are treated for depression need to be on medicine for at least 6-12 months after they feel better. If you are taking medicines as part of your treatment, do not stop taking medicines without first talking to your health care provider. You may need to have the medicine slowly decreased (tapered) over time to decrease the risk of harmful side effects.  Relationships  Your health care provider may suggest family therapy along with individual therapy and drug therapy. While there may not be family problems that are causing you to feel depressed, it is still important to make sure your family learns as much as they can about your mental health. Having your family’s support can help make your treatment successful.  How to recognize changes in your condition  Everyone has a different response to treatment for depression. Recovery from major depression happens when you have not had signs of major depression for two months. This may mean  that you will start to:  · Have more interest in doing activities.  · Feel less hopeless than you did 2 months ago.  · Have more energy.  · Overeat less often, or have better or improving appetite.  · Have better concentration.  Your health care provider will work with you to decide the next steps in your recovery. It is also important to recognize when your condition is getting worse. Watch for these signs:  · Having fatigue or low energy.  · Eating too much or too little.  · Sleeping too much or too little.  · Feeling restless, agitated, or hopeless.  · Having trouble concentrating or making decisions.  · Having unexplained physical complaints.  · Feeling irritable, angry, or aggressive.  Get help as soon as you or your family members notice these symptoms coming back.  How to get support and help from others  How to talk with friends and family members about your condition    Talking to friends and family members about your condition can provide you with one way to get support and guidance. Reach out to trusted friends or family members, explain your symptoms to them, and let them know that you are working with a health care provider to treat your depression.  Financial resources  Not all insurance plans cover mental health care, so it is important to check with your insurance carrier. If paying for co-pays or counseling services is a problem, search for a local or [facility] mental health care center. They may be able to offer public mental health care services at low or no cost when you are not able to see a private health care provider.  If you are taking medicine for depression, you may be able to get the generic form, which may be less expensive. Some makers of prescription medicines also offer help to patients who cannot afford the medicines they need.  Follow these instructions at home:    · Get the right amount and quality of sleep.  · Cut down on using caffeine, tobacco, alcohol, and other potentially  harmful substances.  · Try to exercise, such as walking or lifting small weights.  · Take over-the-counter and prescription medicines only as told by your health care provider.  · Eat a healthy diet that includes plenty of vegetables, fruits, whole grains, low-fat dairy products, and lean protein. Do not eat a lot of foods that are high in solid fats, added sugars, or salt.  · Keep all follow-up visits as told by your health care provider. This is important.  Contact a health care provider if:  · You stop taking your antidepressant medicines, and you have any of these symptoms:  ? Nausea.  ? Headache.  ? Feeling lightheaded.  ? Chills and body aches.  ? Not being able to sleep (insomnia).  · You or your friends and family think your depression is getting worse.  Get help right away if:  · You have thoughts of hurting yourself or others.  If you ever feel like you may hurt yourself or others, or have thoughts about taking your own life, get help right away. You can go to your nearest emergency department or call:  · Your local emergency services (911 in the [place]  · A suicide crisis helpline, such as the National Suicide Prevention Lifeline at 1-277.141.5253. This is open 24-hours a day.  Summary  · If you are living with depression, there are ways to help you recover from it and also ways to prevent it from coming back.  · Work with your health care team to create a management plan that includes counseling, stress management techniques, and healthy lifestyle habits.  This information is not intended to replace advice given to you by your health care provider. Make sure you discuss any questions you have with your health care provider.  Document Released: 11/20/2017 Document Revised: 04/10/2020 Document Reviewed: 11/20/2017  Elsevier Patient Education © 2020 Elsevier Inc.       ADVANCE CARE PLANNING  Conversations that Matter  PLANNING GUIDE    LOOKING BACK ...  Who we are, what we believe, and what we value are all  shaped by experiences we have had. Pent[employer]alism, family traditions, jobs and friends affect us deeply.    Has anything happened in your past that shaped your feelings about medical treatment?    Think about an experience you may have had with a family member or friend who was faced with a decision about medical care near the end of life. What was positive about that experience? What do you wish would have been done differently?    HERE AND NOW ...  Do you have any significant health problems now? What kinds of things bring you such vee that, should a health problem prevent you from doing them any more, life would have little meaning? What short- or long-term goals do you have? How might medical treatment help you or hinder you in accomplishing those goals?    WHAT ABOUT TOMORROW?  What significant health problems do you fear may affect you in the future? How do you feel about the possibility of having to go to a nursing home? How would decisions be made if you could not make them?    WHO SHOULD MAKE DECISIONS?  An important part of planning is to consider whether or not you could appoint someone to make your healthcare decisions if you could not make them yourself. Many people select a close family member, but you are free to pick anyone you think could best represent you. Whoever you appoint should have all of the following qualifications:    • Can be trusted.  • Is willing to accept this responsibility.  • Is willing to follow the values and instructions you have discussed.  • Is able to make complex, difficult decisions.    It is helpful, but not required, to appoint one or more alternate persons in case your first choice becomes unable or unwilling to represent you. It is best if only one person has authority at a time, but you can instruct your representatives to discuss decisions together if time permits.    WHAT FUTURE DECISIONS NEED TO BE CONSIDERED?  Providing instructions for future healthcare decisions may  "seem like an impossible task. How can anyone plan for all the possibilities? You cannot … but you do not have to.    You need to plan for situations where you:  1. Become unexpectedly incapable of making your own decisions  2. It is clear you will have little or no recovery, and  3. The injury or loss of function is significant.    Such a situation might arise because of an injury to the brain from an accident, a stroke, or a slowly progressive disease such as Alzheimer's.    To plan for this type of situation, many people state, \"If I'm going to be a vegetable, let me go,\" or \"No heroics,\" or \"Don't keep me alive on machines.\" While these remarks are a beginning, they simply are too vague to guide decision-making.    You need to completely describe under what circumstances your goals for medical care should be changed from attempting to prolong life to being allowed to die. In some situations, certain treatments may not make sense because they will not help, but other treatments will be of important benefit.    Consider these three questions:  1. When would it make sense to continue certain treatments in an effort to prolong life and seek recovery?  2. When would it make sense to stop or withhold certain treatments and accept death when it comes?  3. Under any circumstance, what kind of comfort care would you want, including medication, spiritual and environmental options?    Making these choices requires understanding the information, weighing the benefits and burdens from your perspective, and then discussing your choices with those closest to you.    WHAT'S NEXT?  How do you make sure that your choices are respected? First talk, about them with your family, friends, clergy and physician, then put your choices in writing. Information about putting your plans into writing -- in an advance directive -- is available from your healthcare organization or .    Do you have any significant health problems? What " health problems do you fear in the future?     Consider what frightens you most about medical treatment. What role does Shinto, luis antonio or spirituality play in how you live your life? How does cost influence your decisions about medical care?   In terms of future medical care, under what circumstances would you want the goals of medical treatment to switch from attempting to prolong life to focusing on comfort?     Describe these circumstances in as much detail as possible.   Ask yourself, what will most help me live well at this point in my life?     Share your views with the person or people who would make your medical decisions if you could not make them.     THERE'S NO EASY WAY TO PLAN FOR FUTURE HEALTHCARE CHOICES.  It's a process that involves thinking and talking about complex and sensitive issues.    The questions that follow will help in the advance care planning process. This is a guide for your own benefit; it's not a test, and there are no right or wrong answers. It does not need to be completed all at once. You may use it to share your feelings with healthcare providers, your family and your friends. The answers to these questions will help those you love make choices for you when you cannot make them yourself.    These are things I need to tell my loved ones:  What is your idea of comfort care? Describe how you would want medications to be used to provide comfort. What type of spiritual care would you want?     I need to learn about: ____________________________  I need to ask my healthcare provider: ________________

## 2021-02-11 DIAGNOSIS — E78.2 MIXED HYPERLIPIDEMIA: Primary | ICD-10-CM

## 2021-02-11 RX ORDER — ATORVASTATIN CALCIUM 10 MG/1
10 TABLET, FILM COATED ORAL NIGHTLY
Qty: 30 TABLET | Refills: 5 | Status: SHIPPED | OUTPATIENT
Start: 2021-02-11 | End: 2021-08-09

## 2021-02-11 NOTE — TELEPHONE ENCOUNTER
Lab Results   Component Value Date    CHOL 227 (H) 05/01/2020    TRIG 60 05/01/2020    HDL 56 05/01/2020     (H) 05/01/2020     The 10-year ASCVD risk score (Verena PINZON Jr., et al., 2013) is: 3.9%    Values used to calculate the score:      Age: 61 years      Sex: Female      Is Non- : No      Diabetic: No      Tobacco smoker: No      Systolic Blood Pressure: 121 mmHg      Is BP treated: No      HDL Cholesterol: 56 mg/dL      Total Cholesterol: 227 mg/dL

## 2021-02-23 DIAGNOSIS — E55.9 VITAMIN D DEFICIENCY: ICD-10-CM

## 2021-02-24 RX ORDER — ERGOCALCIFEROL 1.25 MG/1
50000 CAPSULE ORAL WEEKLY
Qty: 4 CAPSULE | Refills: 5 | Status: SHIPPED | OUTPATIENT
Start: 2021-02-24 | End: 2021-08-09

## 2021-03-06 DIAGNOSIS — J44.9 CHRONIC OBSTRUCTIVE PULMONARY DISEASE, UNSPECIFIED COPD TYPE (HCC): ICD-10-CM

## 2021-03-08 RX ORDER — ALBUTEROL SULFATE 90 UG/1
2 AEROSOL, METERED RESPIRATORY (INHALATION) EVERY 6 HOURS PRN
Qty: 18 G | Refills: 1 | Status: SHIPPED | OUTPATIENT
Start: 2021-03-08 | End: 2021-05-10

## 2021-04-05 ENCOUNTER — OFFICE VISIT (OUTPATIENT)
Dept: FAMILY MEDICINE CLINIC | Facility: CLINIC | Age: 61
End: 2021-04-05

## 2021-04-05 VITALS
OXYGEN SATURATION: 96 % | SYSTOLIC BLOOD PRESSURE: 122 MMHG | TEMPERATURE: 96.9 F | WEIGHT: 112 LBS | HEART RATE: 108 BPM | DIASTOLIC BLOOD PRESSURE: 59 MMHG | RESPIRATION RATE: 16 BRPM | BODY MASS INDEX: 18.66 KG/M2 | HEIGHT: 65 IN

## 2021-04-05 DIAGNOSIS — J44.9 CHRONIC OBSTRUCTIVE PULMONARY DISEASE, UNSPECIFIED COPD TYPE (HCC): Primary | ICD-10-CM

## 2021-04-05 DIAGNOSIS — Z12.11 ENCOUNTER FOR SCREENING FOR MALIGNANT NEOPLASM OF COLON: ICD-10-CM

## 2021-04-05 PROCEDURE — 99213 OFFICE O/P EST LOW 20 MIN: CPT | Performed by: FAMILY MEDICINE

## 2021-04-05 NOTE — PROGRESS NOTES
"  Subjective:     Justa Coles is a 61 y.o. female who presents for  Chief Complaint   Patient presents with   • COPD     6 month follow up     Postmenopausal white female with CMHx of COPD presents for follow up. Respiratory symptoms are well controlled with Breo daily. Requiring albuterol sporadically, denies nocturnal symptoms. Reports annual exacerbation in .     Of note,  passed in 2021.     Past Medical Hx:  Past Medical History:   Diagnosis Date   • Hyperlipidemia        Past Surgical Hx:  Past Surgical History:   Procedure Laterality Date   • APPENDECTOMY     •  SECTION  , ,    • EXCISION BAKERS CYST KNEE Left    • POSTPARTUM TUBAL LIGATION     • TONSILLECTOMY         Social History:  she  reports that she quit smoking about 9 years ago. She has never used smokeless tobacco. She reports previous alcohol use. She reports that she does not use drugs.    Current Meds:    Current Outpatient Medications:   •  albuterol sulfate  (90 Base) MCG/ACT inhaler, Inhale 2 puffs Every 6 (Six) Hours As Needed for Wheezing or Shortness of Air. Dx: J44.9, Disp: 18 g, Rfl: 1  •  atorvastatin (LIPITOR) 10 MG tablet, Take 1 tablet by mouth Every Night., Disp: 30 tablet, Rfl: 5  •  DULoxetine (CYMBALTA) 60 MG capsule, Take 1 capsule by mouth Daily., Disp: 90 capsule, Rfl: 1  •  Fluticasone Furoate-Vilanterol (Breo Ellipta) 200-25 MCG/INH inhaler, Inhale 1 puff Daily. Rinse mouth after each use., Disp: 60 each, Rfl: 5  •  vitamin D (ERGOCALCIFEROL) 1.25 MG (27619 UT) capsule capsule, Take 1 capsule by mouth 1 (One) Time Per Week., Disp: 4 capsule, Rfl: 5      Review of Systems  Review of Systems    Objective:     /59 (BP Location: Left arm, Patient Position: Sitting, Cuff Size: Adult)   Pulse 108   Temp 96.9 °F (36.1 °C) (Temporal)   Resp 16   Ht 165.1 cm (65\")   Wt 50.8 kg (112 lb)   SpO2 96%   BMI 18.64 kg/m²     Physical Exam  Vitals reviewed.   Constitutional:  "      General: She is not in acute distress.     Appearance: She is well-developed. She is cachectic. She is not diaphoretic.   HENT:      Head: Normocephalic and atraumatic.   Cardiovascular:      Rate and Rhythm: Normal rate and regular rhythm.   Pulmonary:      Effort: Pulmonary effort is normal.      Breath sounds: Normal breath sounds.   Skin:     General: Skin is warm and dry.   Neurological:      Mental Status: She is alert and oriented to person, place, and time.   Psychiatric:         Mood and Affect: Mood normal.         Behavior: Behavior normal.         Lab Results   Component Value Date    WBC 9.28 05/01/2020    HGB 14.3 05/01/2020    HCT 42.5 05/01/2020    MCV 86.7 05/01/2020     05/01/2020     Lab Results   Component Value Date    GLUCOSE 92 05/01/2020    BUN 16 05/01/2020    CREATININE 0.76 05/01/2020    EGFRIFNONA 77 05/01/2020    BCR 21.1 05/01/2020    K 4.4 05/01/2020    CO2 26.7 05/01/2020    CALCIUM 9.5 05/01/2020    ALBUMIN 4.20 05/01/2020    LABIL2 1.2 01/19/2019    AST 16 05/01/2020    ALT 10 05/01/2020     Lab Results   Component Value Date    CHOL 227 (H) 05/01/2020    TRIG 60 05/01/2020    HDL 56 05/01/2020     (H) 05/01/2020     The 10-year ASCVD risk score (Verena PINZON Jr., et al., 2013) is: 4%    Values used to calculate the score:      Age: 61 years      Sex: Female      Is Non- : No      Diabetic: No      Tobacco smoker: No      Systolic Blood Pressure: 122 mmHg      Is BP treated: No      HDL Cholesterol: 56 mg/dL      Total Cholesterol: 227 mg/dL      Assessment/Plan:     Problem List Items Addressed This Visit        Pulmonary and Pneumonias    Chronic obstructive pulmonary disease (CMS/HCC) - Primary    Overview     Former smoker.  Continue Breo daily and as needed albuterol.  Vaccinated against S. Pneumonia & COVID-19.         Relevant Medications    Fluticasone Furoate-Vilanterol (Breo Ellipta) 200-25 MCG/INH inhaler      Other Visit Diagnoses      Encounter for screening for malignant neoplasm of colon        Relevant Orders    Ambulatory Referral For Screening Colonoscopy        Follow-up:     Return in about 7 months (around 11/7/2021) for Annual Physical Exam.

## 2021-04-05 NOTE — PATIENT INSTRUCTIONS
Managing Loss, Adult  People experience loss in many different ways throughout their lives. Events such as moving, changing jobs, and losing friends can create a sense of loss. The loss may be as serious as a major health change, divorce, death of a pet, or death of a loved one. All of these types of loss are likely to create a physical and emotional reaction known as grief. Grief is the result of a major change or an absence of something or someone that you count on. Grief is a normal reaction to loss.  A variety of factors can affect your grieving experience, including:  · The nature of your loss.  · Your relationship to what or whom you lost.  · Your understanding of grief and how to manage it.  · Your support system.  How to manage lifestyle changes  Keep to your normal routine as much as possible.  · If you have trouble focusing or doing normal activities, it is acceptable to take some time away from your normal routine.  · Spend time with friends and loved ones.  · Eat a healthy diet, get plenty of sleep, and rest when you feel tired.  How to recognize changes   The way that you deal with your grief will affect your ability to function as you normally do. When grieving, you may experience these changes:  · Numbness, shock, sadness, anxiety, anger, denial, and guilt.  · Thoughts about death.  · Unexpected crying.  · A physical sensation of emptiness in your stomach.  · Problems sleeping and eating.  · Tiredness (fatigue).  · Loss of interest in normal activities.  · Dreaming about or imagining seeing the person who .  · A need to remember what or whom you lost.  · Difficulty thinking about anything other than your loss for a period of time.  · Relief. If you have been expecting the loss for a while, you may feel a sense of relief when it happens.  Follow these instructions at home:    Activity  Express your feelings in healthy ways, such as:  · Talking with others about your loss. It may be helpful to find  others who have had a similar loss, such as a support group.  · Writing down your feelings in a journal.  · Doing physical activities to release stress and emotional energy.  · Doing creative activities like painting, sculpting, or playing or listening to music.  · Practicing resilience. This is the ability to recover and adjust after facing challenges. Reading some resources that encourage resilience may help you to learn ways to practice those behaviors.  General instructions  · Be patient with yourself and others. Allow the grieving process to happen, and remember that grieving takes time.  ? It is likely that you may never feel completely done with some grief. You may find a way to move on while still cherishing memories and feelings about your loss.  ? Accepting your loss is a process. It can take months or longer to adjust.  · Keep all follow-up visits as told by your health care provider. This is important.  Where to find support  To get support for managing loss:  · Ask your health care provider for help and recommendations, such as grief counseling or therapy.  · Think about joining a support group for people who are managing a loss.  Where to find more information  You can find more information about managing loss from:  · American Society of Clinical Oncology: www.cancer.net  · American Psychological Association: www.apa.org  Contact a health care provider if:  · Your grief is extreme and keeps getting worse.  · You have ongoing grief that does not improve.  · Your body shows symptoms of grief, such as illness.  · You feel depressed, anxious, or lonely.  Get help right away if:  · You have thoughts about hurting yourself or others.  If you ever feel like you may hurt yourself or others, or have thoughts about taking your own life, get help right away. You can go to your nearest emergency department or call:  · Your local emergency services (911 in the [place]  · A suicide crisis helpline, such as the  National Suicide Prevention Lifeline at 1-630.261.9857. This is open 24 hours a day.  Summary  · Grief is the result of a major change or an absence of someone or something that you count on. Grief is a normal reaction to loss.  · The depth of grief and the period of recovery depend on the type of loss and your ability to adjust to the change and process your feelings.  · Processing grief requires patience and a willingness to accept your feelings and talk about your loss with people who are supportive.  · It is important to find resources that work for you and to realize that people experience grief differently. There is not one grieving process that works for everyone in the same way.  · Be aware that when grief becomes extreme, it can lead to more severe issues like isolation, depression, anxiety, or suicidal thoughts. Talk with your health care provider if you have any of these issues.  This information is not intended to replace advice given to you by your health care provider. Make sure you discuss any questions you have with your health care provider.  Document Revised: 02/21/2020 Document Reviewed: 05/03/2018  [employer] Patient Education © 2021 [employer]    COPD Action Plan  A COPD action plan is a description of what to do when you have a flare (exacerbation) of chronic obstructive pulmonary disease (COPD). Your action plan is a color-coded plan that lists the symptoms that indicate whether or not your condition is under control and what actions to take.  · If you have symptoms in the green zone, it means you are doing well that day.  · If you have symptoms in the yellow zone, it means you are having a bad day or an exacerbation.  · If you have symptoms in the red zone, you need urgent medical care.  Follow the plan you and your health care provider developed. Review your plan with your health care provider at each visit.  Red zone  Symptoms in this zone mean that you should get medical help right away.  "They include:  · Feeling very short of breath, even when you are resting.  · Not being able to do any activities because of poor breathing.  · Not being able to sleep because of poor breathing.  · Fever or shaking chills.  · Feeling confused or very sleepy.  · Chest pain.  · Coughing up blood.  If you have any of these symptoms, call emergency services (911 in the [place] or go to the nearest emergency room.  Yellow zone  Symptoms in this zone mean that your condition may be getting worse. They include:  · Feeling more short of breath than usual.  · Having less energy for daily activities than usual.  · Phlegm or mucus that is thicker than usual.  · Needing to use your rescue inhaler or nebulizer more often than usual.  · More ankle swelling than usual.  · Coughing more than usual.  · Feeling like you have a chest cold.  · Trouble sleeping due to COPD symptoms.  · Decreased appetite.  · COPD medicines not helping as much as usual.  If you experience any \"yellow\" symptoms:  · Keep taking your daily medicines as directed.  · Use your quick-relief inhaler as told by your health care provider.  · If you were prescribed steroid medicine to take by mouth (oral medicine), start taking it as told by your health care provider.  · If you were prescribed an antibiotic, start taking it as told by your health care provider. Do not stop taking the antibiotic even if you start to feel better.  · Use oxygen as told by your health care provider.  · Get more rest.  · Do your pursed-lip breathing exercises.  · Do not smoke. Avoid any irritants in the air.  If your signs and symptoms do not improve after taking these steps, call your health care provider right away.  Green zone  Symptoms in this zone mean that you are doing well. They include:  · Being able to do your usual activities and exercise.  · Having the usual amount of coughing, including the same amount of phlegm or mucus.  · Being able to sleep well.  · Having a good " appetite.  Follow these instructions at home:  · Continue taking your daily medicines as told by your health care provider.  · Make sure you receive all the immunizations that your health care provider recommends, especially the pneumococcal and influenza vaccines.  · Wash your hands often with soap and water. Have family members wash their hands too. Regular hand washing can help prevent infections.  · Follow your usual exercise and diet plan.  · Avoid irritants in the air, such as smoke.  · Do not use any products that contain nicotine or tobacco, such as cigarettes and e-cigarettes. If you need help quitting, ask your health care provider.  Where to find more information:  You can find more information about COPD from:  · American Lung Association, My COPD Action Plan: www.lung.org/assets/documents/copd/copd-action-plan.pdf  · COPD Foundation: www.copdfoundation.org  · National Heart, Lung, & Blood [place]: https://www.nhlbi.nih.gov/health-topics/copd  This information is not intended to replace advice given to you by your health care provider. Make sure you discuss any questions you have with your health care provider.  Document Revised: 04/24/2019 Document Reviewed: 05/02/2018  Elsevier Patient Education © 2021 Else[employer] Inc.

## 2021-04-19 RX ORDER — DULOXETIN HYDROCHLORIDE 60 MG/1
60 CAPSULE, DELAYED RELEASE ORAL DAILY
Qty: 30 CAPSULE | Refills: 5 | Status: SHIPPED | OUTPATIENT
Start: 2021-04-19 | End: 2022-04-01

## 2021-05-09 DIAGNOSIS — J44.9 CHRONIC OBSTRUCTIVE PULMONARY DISEASE, UNSPECIFIED COPD TYPE (HCC): ICD-10-CM

## 2021-05-10 RX ORDER — ALBUTEROL SULFATE 90 UG/1
2 AEROSOL, METERED RESPIRATORY (INHALATION) EVERY 6 HOURS PRN
Qty: 8 G | Refills: 5 | Status: SHIPPED | OUTPATIENT
Start: 2021-05-10 | End: 2021-05-17

## 2021-05-17 ENCOUNTER — APPOINTMENT (OUTPATIENT)
Dept: GENERAL RADIOLOGY | Facility: HOSPITAL | Age: 61
End: 2021-05-17

## 2021-05-17 ENCOUNTER — HOSPITAL ENCOUNTER (EMERGENCY)
Facility: HOSPITAL | Age: 61
Discharge: HOME | End: 2021-05-17
Admitting: EMERGENCY MEDICINE

## 2021-05-17 ENCOUNTER — HOSPITAL ENCOUNTER (OUTPATIENT)
Facility: HOSPITAL | Age: 61
Setting detail: OBSERVATION
Discharge: LEFT BEFORE DISCHARGE | End: 2021-05-19
Attending: EMERGENCY MEDICINE | Admitting: INTERNAL MEDICINE

## 2021-05-17 VITALS
HEIGHT: 60 IN | BODY MASS INDEX: 21.04 KG/M2 | WEIGHT: 107.14 LBS | DIASTOLIC BLOOD PRESSURE: 69 MMHG | TEMPERATURE: 98.6 F | RESPIRATION RATE: 17 BRPM | HEART RATE: 98 BPM | OXYGEN SATURATION: 97 % | SYSTOLIC BLOOD PRESSURE: 112 MMHG

## 2021-05-17 DIAGNOSIS — A41.9 SEPSIS WITHOUT ACUTE ORGAN DYSFUNCTION, DUE TO UNSPECIFIED ORGANISM (HCC): ICD-10-CM

## 2021-05-17 DIAGNOSIS — A41.9 SEPSIS, DUE TO UNSPECIFIED ORGANISM, UNSPECIFIED WHETHER ACUTE ORGAN DYSFUNCTION PRESENT (HCC): ICD-10-CM

## 2021-05-17 DIAGNOSIS — R53.1 WEAKNESS: Primary | ICD-10-CM

## 2021-05-17 DIAGNOSIS — J18.9 PNEUMONIA DUE TO INFECTIOUS ORGANISM, UNSPECIFIED LATERALITY, UNSPECIFIED PART OF LUNG: Primary | ICD-10-CM

## 2021-05-17 DIAGNOSIS — J18.9 PNEUMONIA OF RIGHT MIDDLE LOBE DUE TO INFECTIOUS ORGANISM: ICD-10-CM

## 2021-05-17 LAB
ALBUMIN SERPL-MCNC: 3.8 G/DL (ref 3.5–5.2)
ALBUMIN/GLOB SERPL: 1 G/DL
ALP SERPL-CCNC: 102 U/L (ref 39–117)
ALT SERPL W P-5'-P-CCNC: 14 U/L (ref 1–33)
ANION GAP SERPL CALCULATED.3IONS-SCNC: 15 MMOL/L (ref 5–15)
APTT PPP: 33.2 SECONDS (ref 24–31)
AST SERPL-CCNC: 14 U/L (ref 1–32)
B PARAPERT DNA SPEC QL NAA+PROBE: NOT DETECTED
B PERT DNA SPEC QL NAA+PROBE: NOT DETECTED
BASOPHILS # BLD AUTO: 0.2 10*3/MM3 (ref 0–0.2)
BASOPHILS NFR BLD AUTO: 0.6 % (ref 0–1.5)
BILIRUB SERPL-MCNC: 0.7 MG/DL (ref 0–1.2)
BUN SERPL-MCNC: 25 MG/DL (ref 8–23)
BUN/CREAT SERPL: 31.6 (ref 7–25)
C PNEUM DNA NPH QL NAA+NON-PROBE: NOT DETECTED
CALCIUM SPEC-SCNC: 9.7 MG/DL (ref 8.6–10.5)
CHLORIDE SERPL-SCNC: 96 MMOL/L (ref 98–107)
CO2 SERPL-SCNC: 24 MMOL/L (ref 22–29)
CREAT SERPL-MCNC: 0.79 MG/DL (ref 0.57–1)
D-LACTATE SERPL-SCNC: 0.5 MMOL/L (ref 0.5–2)
D-LACTATE SERPL-SCNC: 2.4 MMOL/L (ref 0.5–2)
DEPRECATED RDW RBC AUTO: 46.8 FL (ref 37–54)
EOSINOPHIL # BLD AUTO: 0 10*3/MM3 (ref 0–0.4)
EOSINOPHIL NFR BLD AUTO: 0.1 % (ref 0.3–6.2)
ERYTHROCYTE [DISTWIDTH] IN BLOOD BY AUTOMATED COUNT: 15 % (ref 12.3–15.4)
FLUAV SUBTYP SPEC NAA+PROBE: NOT DETECTED
FLUBV RNA ISLT QL NAA+PROBE: NOT DETECTED
GFR SERPL CREATININE-BSD FRML MDRD: 74 ML/MIN/1.73
GLOBULIN UR ELPH-MCNC: 3.7 GM/DL
GLUCOSE SERPL-MCNC: 190 MG/DL (ref 65–99)
HADV DNA SPEC NAA+PROBE: NOT DETECTED
HCOV 229E RNA SPEC QL NAA+PROBE: NOT DETECTED
HCOV HKU1 RNA SPEC QL NAA+PROBE: NOT DETECTED
HCOV NL63 RNA SPEC QL NAA+PROBE: NOT DETECTED
HCOV OC43 RNA SPEC QL NAA+PROBE: NOT DETECTED
HCT VFR BLD AUTO: 43.1 % (ref 34–46.6)
HGB BLD-MCNC: 14.4 G/DL (ref 12–15.9)
HMPV RNA NPH QL NAA+NON-PROBE: NOT DETECTED
HPIV1 RNA SPEC QL NAA+PROBE: NOT DETECTED
HPIV2 RNA SPEC QL NAA+PROBE: NOT DETECTED
HPIV3 RNA NPH QL NAA+PROBE: NOT DETECTED
HPIV4 P GENE NPH QL NAA+PROBE: NOT DETECTED
INR PPP: 1.07 (ref 0.93–1.1)
LYMPHOCYTES # BLD AUTO: 0.6 10*3/MM3 (ref 0.7–3.1)
LYMPHOCYTES NFR BLD AUTO: 2.5 % (ref 19.6–45.3)
M PNEUMO IGG SER IA-ACNC: NOT DETECTED
MAGNESIUM SERPL-MCNC: 2.1 MG/DL (ref 1.6–2.4)
MCH RBC QN AUTO: 30 PG (ref 26.6–33)
MCHC RBC AUTO-ENTMCNC: 33.3 G/DL (ref 31.5–35.7)
MCV RBC AUTO: 89.9 FL (ref 79–97)
MONOCYTES # BLD AUTO: 1.2 10*3/MM3 (ref 0.1–0.9)
MONOCYTES NFR BLD AUTO: 5.1 % (ref 5–12)
NEUTROPHILS NFR BLD AUTO: 22 10*3/MM3 (ref 1.7–7)
NEUTROPHILS NFR BLD AUTO: 91.7 % (ref 42.7–76)
NRBC BLD AUTO-RTO: 0 /100 WBC (ref 0–0.2)
PLATELET # BLD AUTO: 345 10*3/MM3 (ref 140–450)
PMV BLD AUTO: 7.8 FL (ref 6–12)
POTASSIUM SERPL-SCNC: 3.8 MMOL/L (ref 3.5–5.2)
PROT SERPL-MCNC: 7.5 G/DL (ref 6–8.5)
PROTHROMBIN TIME: 11.7 SECONDS (ref 9.6–11.7)
QT INTERVAL: 291 MS
RBC # BLD AUTO: 4.79 10*6/MM3 (ref 3.77–5.28)
RHINOVIRUS RNA SPEC NAA+PROBE: NOT DETECTED
RSV RNA NPH QL NAA+NON-PROBE: NOT DETECTED
SARS-COV-2 RNA NPH QL NAA+NON-PROBE: NOT DETECTED
SODIUM SERPL-SCNC: 135 MMOL/L (ref 136–145)
T4 FREE SERPL-MCNC: 1.42 NG/DL (ref 0.93–1.7)
TSH SERPL DL<=0.05 MIU/L-ACNC: 0.25 UIU/ML (ref 0.27–4.2)
WBC # BLD AUTO: 24 10*3/MM3 (ref 3.4–10.8)

## 2021-05-17 PROCEDURE — 96361 HYDRATE IV INFUSION ADD-ON: CPT

## 2021-05-17 PROCEDURE — 99219 PR INITIAL OBSERVATION CARE/DAY 50 MINUTES: CPT | Performed by: NURSE PRACTITIONER

## 2021-05-17 PROCEDURE — 96374 THER/PROPH/DIAG INJ IV PUSH: CPT

## 2021-05-17 PROCEDURE — 25010000002 CEFTRIAXONE PER 250 MG: Performed by: NURSE PRACTITIONER

## 2021-05-17 PROCEDURE — 83735 ASSAY OF MAGNESIUM: CPT | Performed by: NURSE PRACTITIONER

## 2021-05-17 PROCEDURE — G0378 HOSPITAL OBSERVATION PER HR: HCPCS

## 2021-05-17 PROCEDURE — 85610 PROTHROMBIN TIME: CPT | Performed by: NURSE PRACTITIONER

## 2021-05-17 PROCEDURE — 85025 COMPLETE CBC W/AUTO DIFF WBC: CPT | Performed by: NURSE PRACTITIONER

## 2021-05-17 PROCEDURE — 99284 EMERGENCY DEPT VISIT MOD MDM: CPT

## 2021-05-17 PROCEDURE — 84439 ASSAY OF FREE THYROXINE: CPT | Performed by: NURSE PRACTITIONER

## 2021-05-17 PROCEDURE — 93005 ELECTROCARDIOGRAM TRACING: CPT

## 2021-05-17 PROCEDURE — 71045 X-RAY EXAM CHEST 1 VIEW: CPT

## 2021-05-17 PROCEDURE — 87040 BLOOD CULTURE FOR BACTERIA: CPT | Performed by: NURSE PRACTITIONER

## 2021-05-17 PROCEDURE — 83605 ASSAY OF LACTIC ACID: CPT

## 2021-05-17 PROCEDURE — 85730 THROMBOPLASTIN TIME PARTIAL: CPT | Performed by: NURSE PRACTITIONER

## 2021-05-17 PROCEDURE — 0202U NFCT DS 22 TRGT SARS-COV-2: CPT | Performed by: NURSE PRACTITIONER

## 2021-05-17 PROCEDURE — 84443 ASSAY THYROID STIM HORMONE: CPT | Performed by: NURSE PRACTITIONER

## 2021-05-17 PROCEDURE — 80053 COMPREHEN METABOLIC PANEL: CPT | Performed by: NURSE PRACTITIONER

## 2021-05-17 RX ORDER — AZITHROMYCIN 250 MG/1
500 TABLET, FILM COATED ORAL ONCE
Status: COMPLETED | OUTPATIENT
Start: 2021-05-17 | End: 2021-05-17

## 2021-05-17 RX ORDER — AZITHROMYCIN 250 MG/1
250 TABLET, FILM COATED ORAL
Status: DISCONTINUED | OUTPATIENT
Start: 2021-05-18 | End: 2021-05-18

## 2021-05-17 RX ORDER — ACETAMINOPHEN 500 MG
1000 TABLET ORAL ONCE
Status: COMPLETED | OUTPATIENT
Start: 2021-05-17 | End: 2021-05-17

## 2021-05-17 RX ORDER — CHOLECALCIFEROL (VITAMIN D3) 125 MCG
5 CAPSULE ORAL NIGHTLY PRN
Status: DISCONTINUED | OUTPATIENT
Start: 2021-05-17 | End: 2021-05-19 | Stop reason: HOSPADM

## 2021-05-17 RX ORDER — BENZONATATE 100 MG/1
100 CAPSULE ORAL 3 TIMES DAILY PRN
Status: DISCONTINUED | OUTPATIENT
Start: 2021-05-17 | End: 2021-05-19 | Stop reason: HOSPADM

## 2021-05-17 RX ORDER — ONDANSETRON 2 MG/ML
4 INJECTION INTRAMUSCULAR; INTRAVENOUS EVERY 6 HOURS PRN
Status: DISCONTINUED | OUTPATIENT
Start: 2021-05-17 | End: 2021-05-19 | Stop reason: HOSPADM

## 2021-05-17 RX ORDER — SODIUM CHLORIDE 9 MG/ML
100 INJECTION, SOLUTION INTRAVENOUS CONTINUOUS
Status: DISCONTINUED | OUTPATIENT
Start: 2021-05-17 | End: 2021-05-19 | Stop reason: HOSPADM

## 2021-05-17 RX ORDER — SODIUM CHLORIDE 0.9 % (FLUSH) 0.9 %
10 SYRINGE (ML) INJECTION AS NEEDED
Status: DISCONTINUED | OUTPATIENT
Start: 2021-05-17 | End: 2021-05-17 | Stop reason: HOSPADM

## 2021-05-17 RX ORDER — AZITHROMYCIN 250 MG/1
250 TABLET, FILM COATED ORAL DAILY
Qty: 4 TABLET | Refills: 0 | Status: SHIPPED | OUTPATIENT
Start: 2021-05-17 | End: 2021-05-21

## 2021-05-17 RX ORDER — SODIUM CHLORIDE 0.9 % (FLUSH) 0.9 %
3-10 SYRINGE (ML) INJECTION AS NEEDED
Status: DISCONTINUED | OUTPATIENT
Start: 2021-05-17 | End: 2021-05-19 | Stop reason: HOSPADM

## 2021-05-17 RX ORDER — AMOXICILLIN AND CLAVULANATE POTASSIUM 875; 125 MG/1; MG/1
1 TABLET, FILM COATED ORAL 2 TIMES DAILY
Qty: 20 TABLET | Refills: 0 | Status: SHIPPED | OUTPATIENT
Start: 2021-05-17 | End: 2021-05-27

## 2021-05-17 RX ORDER — SODIUM CHLORIDE 0.9 % (FLUSH) 0.9 %
3 SYRINGE (ML) INJECTION EVERY 12 HOURS SCHEDULED
Status: DISCONTINUED | OUTPATIENT
Start: 2021-05-17 | End: 2021-05-19 | Stop reason: HOSPADM

## 2021-05-17 RX ORDER — ONDANSETRON 4 MG/1
4 TABLET, FILM COATED ORAL EVERY 6 HOURS PRN
Status: DISCONTINUED | OUTPATIENT
Start: 2021-05-17 | End: 2021-05-19 | Stop reason: HOSPADM

## 2021-05-17 RX ORDER — NITROGLYCERIN 0.4 MG/1
0.4 TABLET SUBLINGUAL
Status: DISCONTINUED | OUTPATIENT
Start: 2021-05-17 | End: 2021-05-19 | Stop reason: HOSPADM

## 2021-05-17 RX ORDER — IPRATROPIUM BROMIDE AND ALBUTEROL SULFATE 2.5; .5 MG/3ML; MG/3ML
3 SOLUTION RESPIRATORY (INHALATION) EVERY 4 HOURS PRN
Status: DISCONTINUED | OUTPATIENT
Start: 2021-05-17 | End: 2021-05-19 | Stop reason: HOSPADM

## 2021-05-17 RX ORDER — DULOXETIN HYDROCHLORIDE 30 MG/1
60 CAPSULE, DELAYED RELEASE ORAL DAILY
Status: DISCONTINUED | OUTPATIENT
Start: 2021-05-18 | End: 2021-05-18

## 2021-05-17 RX ORDER — ATORVASTATIN CALCIUM 10 MG/1
10 TABLET, FILM COATED ORAL NIGHTLY
Status: DISCONTINUED | OUTPATIENT
Start: 2021-05-17 | End: 2021-05-19 | Stop reason: HOSPADM

## 2021-05-17 RX ORDER — ACETAMINOPHEN 160 MG/5ML
650 SOLUTION ORAL EVERY 4 HOURS PRN
Status: DISCONTINUED | OUTPATIENT
Start: 2021-05-17 | End: 2021-05-19 | Stop reason: HOSPADM

## 2021-05-17 RX ORDER — ACETAMINOPHEN 325 MG/1
650 TABLET ORAL EVERY 4 HOURS PRN
Status: DISCONTINUED | OUTPATIENT
Start: 2021-05-17 | End: 2021-05-19 | Stop reason: HOSPADM

## 2021-05-17 RX ORDER — SODIUM CHLORIDE 0.9 % (FLUSH) 0.9 %
10 SYRINGE (ML) INJECTION AS NEEDED
Status: DISCONTINUED | OUTPATIENT
Start: 2021-05-17 | End: 2021-05-19 | Stop reason: HOSPADM

## 2021-05-17 RX ORDER — ACETAMINOPHEN 650 MG/1
650 SUPPOSITORY RECTAL EVERY 4 HOURS PRN
Status: DISCONTINUED | OUTPATIENT
Start: 2021-05-17 | End: 2021-05-19 | Stop reason: HOSPADM

## 2021-05-17 RX ORDER — BUDESONIDE AND FORMOTEROL FUMARATE DIHYDRATE 160; 4.5 UG/1; UG/1
2 AEROSOL RESPIRATORY (INHALATION)
Refills: 5 | Status: DISCONTINUED | OUTPATIENT
Start: 2021-05-17 | End: 2021-05-19 | Stop reason: HOSPADM

## 2021-05-17 RX ADMIN — SODIUM CHLORIDE 1470 ML: 9 INJECTION, SOLUTION INTRAVENOUS at 18:49

## 2021-05-17 RX ADMIN — ACETAMINOPHEN 650 MG: 325 TABLET, FILM COATED ORAL at 23:24

## 2021-05-17 RX ADMIN — WATER 1 G: 100 INJECTION, SOLUTION INTRAVENOUS at 08:46

## 2021-05-17 RX ADMIN — SODIUM CHLORIDE 1000 ML: 9 INJECTION, SOLUTION INTRAVENOUS at 07:28

## 2021-05-17 RX ADMIN — AZITHROMYCIN MONOHYDRATE 500 MG: 250 TABLET ORAL at 08:46

## 2021-05-17 RX ADMIN — ACETAMINOPHEN 1000 MG: 500 TABLET, FILM COATED ORAL at 07:04

## 2021-05-17 RX ADMIN — Medication 3 ML: at 23:24

## 2021-05-17 RX ADMIN — SODIUM CHLORIDE 100 ML/HR: 9 INJECTION, SOLUTION INTRAVENOUS at 21:24

## 2021-05-18 PROBLEM — E86.0 DEHYDRATION: Status: ACTIVE | Noted: 2021-05-18

## 2021-05-18 PROBLEM — A41.9 SEPSIS ASSOCIATED HYPOTENSION: Status: ACTIVE | Noted: 2021-05-18

## 2021-05-18 PROBLEM — I95.9 SEPSIS ASSOCIATED HYPOTENSION: Status: ACTIVE | Noted: 2021-05-18

## 2021-05-18 PROBLEM — E87.6 HYPOKALEMIA: Status: ACTIVE | Noted: 2021-05-18

## 2021-05-18 LAB
ANION GAP SERPL CALCULATED.3IONS-SCNC: 11 MMOL/L (ref 5–15)
ANION GAP SERPL CALCULATED.3IONS-SCNC: 12 MMOL/L (ref 5–15)
BASOPHILS # BLD AUTO: 0.1 10*3/MM3 (ref 0–0.2)
BASOPHILS # BLD AUTO: 0.1 10*3/MM3 (ref 0–0.2)
BASOPHILS NFR BLD AUTO: 0.4 % (ref 0–1.5)
BASOPHILS NFR BLD AUTO: 0.8 % (ref 0–1.5)
BUN SERPL-MCNC: 13 MG/DL (ref 8–23)
BUN SERPL-MCNC: 15 MG/DL (ref 8–23)
BUN/CREAT SERPL: 19.7 (ref 7–25)
BUN/CREAT SERPL: 32.6 (ref 7–25)
CALCIUM SPEC-SCNC: 8.5 MG/DL (ref 8.6–10.5)
CALCIUM SPEC-SCNC: 8.6 MG/DL (ref 8.6–10.5)
CHLORIDE SERPL-SCNC: 108 MMOL/L (ref 98–107)
CHLORIDE SERPL-SCNC: 108 MMOL/L (ref 98–107)
CO2 SERPL-SCNC: 21 MMOL/L (ref 22–29)
CO2 SERPL-SCNC: 21 MMOL/L (ref 22–29)
CREAT SERPL-MCNC: 0.46 MG/DL (ref 0.57–1)
CREAT SERPL-MCNC: 0.66 MG/DL (ref 0.57–1)
D-LACTATE SERPL-SCNC: 0.7 MMOL/L (ref 0.5–2)
DEPRECATED RDW RBC AUTO: 45.9 FL (ref 37–54)
DEPRECATED RDW RBC AUTO: 46.8 FL (ref 37–54)
EOSINOPHIL # BLD AUTO: 0.2 10*3/MM3 (ref 0–0.4)
EOSINOPHIL # BLD AUTO: 0.4 10*3/MM3 (ref 0–0.4)
EOSINOPHIL NFR BLD AUTO: 1 % (ref 0.3–6.2)
EOSINOPHIL NFR BLD AUTO: 3.2 % (ref 0.3–6.2)
ERYTHROCYTE [DISTWIDTH] IN BLOOD BY AUTOMATED COUNT: 14.9 % (ref 12.3–15.4)
ERYTHROCYTE [DISTWIDTH] IN BLOOD BY AUTOMATED COUNT: 15 % (ref 12.3–15.4)
GFR SERPL CREATININE-BSD FRML MDRD: 138 ML/MIN/1.73
GFR SERPL CREATININE-BSD FRML MDRD: 91 ML/MIN/1.73
GLUCOSE SERPL-MCNC: 103 MG/DL (ref 65–99)
GLUCOSE SERPL-MCNC: 135 MG/DL (ref 65–99)
HCT VFR BLD AUTO: 33.4 % (ref 34–46.6)
HCT VFR BLD AUTO: 34.3 % (ref 34–46.6)
HGB BLD-MCNC: 11.2 G/DL (ref 12–15.9)
HGB BLD-MCNC: 11.4 G/DL (ref 12–15.9)
LYMPHOCYTES # BLD AUTO: 1.3 10*3/MM3 (ref 0.7–3.1)
LYMPHOCYTES # BLD AUTO: 1.4 10*3/MM3 (ref 0.7–3.1)
LYMPHOCYTES NFR BLD AUTO: 10.5 % (ref 19.6–45.3)
LYMPHOCYTES NFR BLD AUTO: 7.1 % (ref 19.6–45.3)
MCH RBC QN AUTO: 29.6 PG (ref 26.6–33)
MCH RBC QN AUTO: 29.6 PG (ref 26.6–33)
MCHC RBC AUTO-ENTMCNC: 33.3 G/DL (ref 31.5–35.7)
MCHC RBC AUTO-ENTMCNC: 33.4 G/DL (ref 31.5–35.7)
MCV RBC AUTO: 88.5 FL (ref 79–97)
MCV RBC AUTO: 89.1 FL (ref 79–97)
MONOCYTES # BLD AUTO: 1.5 10*3/MM3 (ref 0.1–0.9)
MONOCYTES # BLD AUTO: 1.8 10*3/MM3 (ref 0.1–0.9)
MONOCYTES NFR BLD AUTO: 10.2 % (ref 5–12)
MONOCYTES NFR BLD AUTO: 11.2 % (ref 5–12)
NEUTROPHILS NFR BLD AUTO: 10.1 10*3/MM3 (ref 1.7–7)
NEUTROPHILS NFR BLD AUTO: 14.6 10*3/MM3 (ref 1.7–7)
NEUTROPHILS NFR BLD AUTO: 74.3 % (ref 42.7–76)
NEUTROPHILS NFR BLD AUTO: 81.3 % (ref 42.7–76)
NRBC BLD AUTO-RTO: 0 /100 WBC (ref 0–0.2)
NRBC BLD AUTO-RTO: 0.1 /100 WBC (ref 0–0.2)
PLATELET # BLD AUTO: 275 10*3/MM3 (ref 140–450)
PLATELET # BLD AUTO: 305 10*3/MM3 (ref 140–450)
PMV BLD AUTO: 7.8 FL (ref 6–12)
PMV BLD AUTO: 8.1 FL (ref 6–12)
POTASSIUM SERPL-SCNC: 3.4 MMOL/L (ref 3.5–5.2)
POTASSIUM SERPL-SCNC: 3.7 MMOL/L (ref 3.5–5.2)
RBC # BLD AUTO: 3.77 10*6/MM3 (ref 3.77–5.28)
RBC # BLD AUTO: 3.86 10*6/MM3 (ref 3.77–5.28)
SODIUM SERPL-SCNC: 140 MMOL/L (ref 136–145)
SODIUM SERPL-SCNC: 141 MMOL/L (ref 136–145)
WBC # BLD AUTO: 13.5 10*3/MM3 (ref 3.4–10.8)
WBC # BLD AUTO: 18 10*3/MM3 (ref 3.4–10.8)

## 2021-05-18 PROCEDURE — 99226 PR SBSQ OBSERVATION CARE/DAY 35 MINUTES: CPT | Performed by: INTERNAL MEDICINE

## 2021-05-18 PROCEDURE — 85025 COMPLETE CBC W/AUTO DIFF WBC: CPT | Performed by: INTERNAL MEDICINE

## 2021-05-18 PROCEDURE — 96365 THER/PROPH/DIAG IV INF INIT: CPT

## 2021-05-18 PROCEDURE — 83605 ASSAY OF LACTIC ACID: CPT | Performed by: NURSE PRACTITIONER

## 2021-05-18 PROCEDURE — 94799 UNLISTED PULMONARY SVC/PX: CPT

## 2021-05-18 PROCEDURE — 25010000002 CEFTRIAXONE PER 250 MG: Performed by: NURSE PRACTITIONER

## 2021-05-18 PROCEDURE — 93005 ELECTROCARDIOGRAM TRACING: CPT | Performed by: NURSE PRACTITIONER

## 2021-05-18 PROCEDURE — 94640 AIRWAY INHALATION TREATMENT: CPT

## 2021-05-18 PROCEDURE — G0378 HOSPITAL OBSERVATION PER HR: HCPCS

## 2021-05-18 PROCEDURE — 80048 BASIC METABOLIC PNL TOTAL CA: CPT | Performed by: INTERNAL MEDICINE

## 2021-05-18 PROCEDURE — 85025 COMPLETE CBC W/AUTO DIFF WBC: CPT | Performed by: NURSE PRACTITIONER

## 2021-05-18 PROCEDURE — 93010 ELECTROCARDIOGRAM REPORT: CPT | Performed by: INTERNAL MEDICINE

## 2021-05-18 PROCEDURE — 80048 BASIC METABOLIC PNL TOTAL CA: CPT | Performed by: NURSE PRACTITIONER

## 2021-05-18 PROCEDURE — 96361 HYDRATE IV INFUSION ADD-ON: CPT

## 2021-05-18 RX ORDER — MAGNESIUM SULFATE HEPTAHYDRATE 40 MG/ML
2 INJECTION, SOLUTION INTRAVENOUS AS NEEDED
Status: DISCONTINUED | OUTPATIENT
Start: 2021-05-18 | End: 2021-05-19 | Stop reason: HOSPADM

## 2021-05-18 RX ORDER — MAGNESIUM SULFATE HEPTAHYDRATE 40 MG/ML
4 INJECTION, SOLUTION INTRAVENOUS AS NEEDED
Status: DISCONTINUED | OUTPATIENT
Start: 2021-05-18 | End: 2021-05-19 | Stop reason: HOSPADM

## 2021-05-18 RX ORDER — POTASSIUM CHLORIDE 1.5 G/1.77G
40 POWDER, FOR SOLUTION ORAL AS NEEDED
Status: DISCONTINUED | OUTPATIENT
Start: 2021-05-18 | End: 2021-05-19 | Stop reason: HOSPADM

## 2021-05-18 RX ORDER — POTASSIUM CHLORIDE 7.45 MG/ML
10 INJECTION INTRAVENOUS
Status: DISCONTINUED | OUTPATIENT
Start: 2021-05-18 | End: 2021-05-19 | Stop reason: HOSPADM

## 2021-05-18 RX ORDER — DULOXETIN HYDROCHLORIDE 30 MG/1
60 CAPSULE, DELAYED RELEASE ORAL NIGHTLY
Status: DISCONTINUED | OUTPATIENT
Start: 2021-05-18 | End: 2021-05-19 | Stop reason: HOSPADM

## 2021-05-18 RX ORDER — POTASSIUM CHLORIDE 20 MEQ/1
40 TABLET, EXTENDED RELEASE ORAL AS NEEDED
Status: DISCONTINUED | OUTPATIENT
Start: 2021-05-18 | End: 2021-05-19 | Stop reason: HOSPADM

## 2021-05-18 RX ADMIN — BUDESONIDE AND FORMOTEROL FUMARATE DIHYDRATE 2 PUFF: 160; 4.5 AEROSOL RESPIRATORY (INHALATION) at 07:42

## 2021-05-18 RX ADMIN — ACETAMINOPHEN 650 MG: 325 TABLET, FILM COATED ORAL at 21:04

## 2021-05-18 RX ADMIN — AZITHROMYCIN DIHYDRATE 250 MG: 250 TABLET, FILM COATED ORAL at 08:24

## 2021-05-18 RX ADMIN — ACETAMINOPHEN 650 MG: 325 TABLET, FILM COATED ORAL at 08:24

## 2021-05-18 RX ADMIN — POTASSIUM CHLORIDE 40 MEQ: 1500 TABLET, EXTENDED RELEASE ORAL at 10:15

## 2021-05-18 RX ADMIN — Medication 3 ML: at 08:25

## 2021-05-18 RX ADMIN — CEFTRIAXONE SODIUM 1 G: 1 INJECTION, POWDER, FOR SOLUTION INTRAMUSCULAR; INTRAVENOUS at 08:24

## 2021-05-18 RX ADMIN — BUDESONIDE AND FORMOTEROL FUMARATE DIHYDRATE 2 PUFF: 160; 4.5 AEROSOL RESPIRATORY (INHALATION) at 19:13

## 2021-05-18 RX ADMIN — SODIUM CHLORIDE 100 ML/HR: 9 INJECTION, SOLUTION INTRAVENOUS at 16:13

## 2021-05-18 RX ADMIN — Medication 3 ML: at 21:07

## 2021-05-19 ENCOUNTER — TELEPHONE (OUTPATIENT)
Dept: FAMILY MEDICINE CLINIC | Facility: CLINIC | Age: 61
End: 2021-05-19

## 2021-05-19 ENCOUNTER — OFFICE VISIT (OUTPATIENT)
Dept: FAMILY MEDICINE CLINIC | Facility: CLINIC | Age: 61
End: 2021-05-19

## 2021-05-19 VITALS
SYSTOLIC BLOOD PRESSURE: 114 MMHG | HEIGHT: 64 IN | OXYGEN SATURATION: 96 % | HEART RATE: 86 BPM | DIASTOLIC BLOOD PRESSURE: 60 MMHG | RESPIRATION RATE: 16 BRPM | WEIGHT: 119.05 LBS | TEMPERATURE: 97.9 F | BODY MASS INDEX: 20.32 KG/M2

## 2021-05-19 VITALS
SYSTOLIC BLOOD PRESSURE: 134 MMHG | WEIGHT: 115 LBS | BODY MASS INDEX: 19.63 KG/M2 | HEART RATE: 115 BPM | OXYGEN SATURATION: 93 % | DIASTOLIC BLOOD PRESSURE: 74 MMHG | HEIGHT: 64 IN

## 2021-05-19 DIAGNOSIS — J18.9 PNEUMONIA OF RIGHT LUNG DUE TO INFECTIOUS ORGANISM, UNSPECIFIED PART OF LUNG: Primary | ICD-10-CM

## 2021-05-19 LAB
ANION GAP SERPL CALCULATED.3IONS-SCNC: 8 MMOL/L (ref 5–15)
BASOPHILS # BLD AUTO: 0 10*3/MM3 (ref 0–0.2)
BASOPHILS NFR BLD AUTO: 0.4 % (ref 0–1.5)
BUN SERPL-MCNC: 8 MG/DL (ref 8–23)
BUN/CREAT SERPL: 17 (ref 7–25)
CALCIUM SPEC-SCNC: 8.5 MG/DL (ref 8.6–10.5)
CHLORIDE SERPL-SCNC: 110 MMOL/L (ref 98–107)
CO2 SERPL-SCNC: 24 MMOL/L (ref 22–29)
CREAT SERPL-MCNC: 0.47 MG/DL (ref 0.57–1)
DEPRECATED RDW RBC AUTO: 45.9 FL (ref 37–54)
EOSINOPHIL # BLD AUTO: 0.4 10*3/MM3 (ref 0–0.4)
EOSINOPHIL NFR BLD AUTO: 3.8 % (ref 0.3–6.2)
ERYTHROCYTE [DISTWIDTH] IN BLOOD BY AUTOMATED COUNT: 14.8 % (ref 12.3–15.4)
GFR SERPL CREATININE-BSD FRML MDRD: 134 ML/MIN/1.73
GLUCOSE SERPL-MCNC: 98 MG/DL (ref 65–99)
HCT VFR BLD AUTO: 32.2 % (ref 34–46.6)
HGB BLD-MCNC: 10.8 G/DL (ref 12–15.9)
LYMPHOCYTES # BLD AUTO: 1.2 10*3/MM3 (ref 0.7–3.1)
LYMPHOCYTES NFR BLD AUTO: 10.5 % (ref 19.6–45.3)
MCH RBC QN AUTO: 29.9 PG (ref 26.6–33)
MCHC RBC AUTO-ENTMCNC: 33.6 G/DL (ref 31.5–35.7)
MCV RBC AUTO: 88.8 FL (ref 79–97)
MONOCYTES # BLD AUTO: 1.4 10*3/MM3 (ref 0.1–0.9)
MONOCYTES NFR BLD AUTO: 13.1 % (ref 5–12)
NEUTROPHILS NFR BLD AUTO: 7.9 10*3/MM3 (ref 1.7–7)
NEUTROPHILS NFR BLD AUTO: 72.2 % (ref 42.7–76)
NRBC BLD AUTO-RTO: 0 /100 WBC (ref 0–0.2)
PLATELET # BLD AUTO: 294 10*3/MM3 (ref 140–450)
PMV BLD AUTO: 8.1 FL (ref 6–12)
POTASSIUM SERPL-SCNC: 4.1 MMOL/L (ref 3.5–5.2)
RBC # BLD AUTO: 3.63 10*6/MM3 (ref 3.77–5.28)
SODIUM SERPL-SCNC: 142 MMOL/L (ref 136–145)
WBC # BLD AUTO: 10.9 10*3/MM3 (ref 3.4–10.8)

## 2021-05-19 PROCEDURE — 25010000002 CEFTRIAXONE PER 250 MG: Performed by: NURSE PRACTITIONER

## 2021-05-19 PROCEDURE — 80048 BASIC METABOLIC PNL TOTAL CA: CPT | Performed by: INTERNAL MEDICINE

## 2021-05-19 PROCEDURE — 99213 OFFICE O/P EST LOW 20 MIN: CPT | Performed by: NURSE PRACTITIONER

## 2021-05-19 PROCEDURE — 85025 COMPLETE CBC W/AUTO DIFF WBC: CPT | Performed by: INTERNAL MEDICINE

## 2021-05-19 PROCEDURE — G0378 HOSPITAL OBSERVATION PER HR: HCPCS

## 2021-05-19 RX ADMIN — Medication 3 ML: at 08:58

## 2021-05-19 RX ADMIN — CEFTRIAXONE SODIUM 1 G: 1 INJECTION, POWDER, FOR SOLUTION INTRAMUSCULAR; INTRAVENOUS at 08:58

## 2021-05-19 NOTE — TELEPHONE ENCOUNTER
DISREGARD... Pt made appt    Pt was just released from [facility], states she needs a note to return to work. Would like to return to work tomorrow.

## 2021-05-19 NOTE — PROGRESS NOTES
Subjective   Justa Coles is a 61 y.o. female.       HPI   Pt. is here today for [facility] hospital follow up.  She reports she went to ER on 5/17 - dx. with pneumonia right lung; dehydration; sepsis.  She refused admission and was sent home with Augmentin and a Zpak.  Went home and later that day began to feel worse; went back to ER and was admitted for observation.   She reports she was given IV fluids and IV abx.    Says she was to be discharged this morning after another dose of IV abx.  She feels everything got delayed and she didn't want to wait any longer.  She left the hospital today AMA.  She left before any further IV abx. could be given or any discharge instructions or discharge medications were given.  She had the scripts filled for both the Augmentin and Zpak that she was given on that initial She presents to the office now with request for a work note to return to work tomorrow.  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       The following portions of the patient's history were reviewed and updated as appropriate: allergies, current medications, past family history, past medical history, past social history, past surgical history and problem list.    Review of Systems   Constitutional: Positive for fatigue. Negative for activity change, appetite change, chills, diaphoresis, fever, unexpected weight gain and unexpected weight loss.   Respiratory: Positive for cough and shortness of breath. Negative for chest tightness and wheezing.    Cardiovascular: Negative for chest pain, palpitations and leg swelling.   Gastrointestinal: Negative for diarrhea, nausea and vomiting.   Genitourinary: Negative for dysuria, frequency, hematuria and urgency.   Neurological: Negative for dizziness, headache and confusion.   Psychiatric/Behavioral: Negative for depressed mood. The patient is not nervous/anxious.        Objective   Physical Exam  Vitals reviewed.   Constitutional:       General: She is not in acute  distress.     Appearance: Normal appearance.   Cardiovascular:      Rate and Rhythm: Normal rate and regular rhythm.      Pulses: Normal pulses.      Heart sounds: Normal heart sounds. No murmur heard.     Pulmonary:      Effort: Pulmonary effort is normal. No respiratory distress.      Breath sounds: Wheezing (scattered expiratory wheezes throughout. ) present.   Chest:      Chest wall: No tenderness.   Neurological:      General: No focal deficit present.      Mental Status: She is alert and oriented to person, place, and time.   Psychiatric:         Mood and Affect: Mood is anxious.           Assessment/Plan   Diagnoses and all orders for this visit:    1. Pneumonia of right lung due to infectious organism, unspecified part of lung (Primary)  Comments:  Pt. advised to finish all oral abx (Augmentin and Zpak)  Pt. will not be released back to work until abx. completed; may return May 24th.       Pt. advised to increase fluids and rest.    Pt. given strict ER instructions for worsening.   Follow up in office in 2 weeks.

## 2021-05-22 LAB
BACTERIA SPEC AEROBE CULT: NORMAL
BACTERIA SPEC AEROBE CULT: NORMAL

## 2021-06-01 ENCOUNTER — OFFICE VISIT (OUTPATIENT)
Dept: FAMILY MEDICINE CLINIC | Facility: CLINIC | Age: 61
End: 2021-06-01

## 2021-06-01 VITALS
HEIGHT: 64 IN | OXYGEN SATURATION: 95 % | DIASTOLIC BLOOD PRESSURE: 69 MMHG | BODY MASS INDEX: 18.95 KG/M2 | WEIGHT: 111 LBS | HEART RATE: 100 BPM | TEMPERATURE: 98 F | SYSTOLIC BLOOD PRESSURE: 145 MMHG

## 2021-06-01 DIAGNOSIS — J44.9 CHRONIC OBSTRUCTIVE PULMONARY DISEASE, UNSPECIFIED COPD TYPE (HCC): Primary | ICD-10-CM

## 2021-06-01 PROCEDURE — 99213 OFFICE O/P EST LOW 20 MIN: CPT | Performed by: FAMILY MEDICINE

## 2021-06-01 NOTE — PATIENT INSTRUCTIONS
"COPD Action Plan  A COPD action plan is a description of what to do when you have a flare (exacerbation) of chronic obstructive pulmonary disease (COPD). Your action plan is a color-coded plan that lists the symptoms that indicate whether or not your condition is under control and what actions to take.  · If you have symptoms in the green zone, it means you are doing well that day.  · If you have symptoms in the yellow zone, it means you are having a bad day or an exacerbation.  · If you have symptoms in the red zone, you need urgent medical care.  Follow the plan you and your health care provider developed. Review your plan with your health care provider at each visit.  Red zone  Symptoms in this zone mean that you should get medical help right away. They include:  · Feeling very short of breath, even when you are resting.  · Not being able to do any activities because of poor breathing.  · Not being able to sleep because of poor breathing.  · Fever or shaking chills.  · Feeling confused or very sleepy.  · Chest pain.  · Coughing up blood.  If you have any of these symptoms, call emergency services (911 in the [place] or go to the nearest emergency room.  Yellow zone  Symptoms in this zone mean that your condition may be getting worse. They include:  · Feeling more short of breath than usual.  · Having less energy for daily activities than usual.  · Phlegm or mucus that is thicker than usual.  · Needing to use your rescue inhaler or nebulizer more often than usual.  · More ankle swelling than usual.  · Coughing more than usual.  · Feeling like you have a chest cold.  · Trouble sleeping due to COPD symptoms.  · Decreased appetite.  · COPD medicines not helping as much as usual.  If you experience any \"yellow\" symptoms:  · Keep taking your daily medicines as directed.  · Use your quick-relief inhaler as told by your health care provider.  · If you were prescribed steroid medicine to take by mouth (oral medicine), start " taking it as told by your health care provider.  · If you were prescribed an antibiotic, start taking it as told by your health care provider. Do not stop taking the antibiotic even if you start to feel better.  · Use oxygen as told by your health care provider.  · Get more rest.  · Do your pursed-lip breathing exercises.  · Do not smoke. Avoid any irritants in the air.  If your signs and symptoms do not improve after taking these steps, call your health care provider right away.  Green zone  Symptoms in this zone mean that you are doing well. They include:  · Being able to do your usual activities and exercise.  · Having the usual amount of coughing, including the same amount of phlegm or mucus.  · Being able to sleep well.  · Having a good appetite.  Follow these instructions at home:  · Continue taking your daily medicines as told by your health care provider.  · Make sure you receive all the immunizations that your health care provider recommends, especially the pneumococcal and influenza vaccines.  · Wash your hands often with soap and water. Have family members wash their hands too. Regular hand washing can help prevent infections.  · Follow your usual exercise and diet plan.  · Avoid irritants in the air, such as smoke.  · Do not use any products that contain nicotine or tobacco, such as cigarettes and e-cigarettes. If you need help quitting, ask your health care provider.  Where to find more information:  You can find more information about COPD from:  · American Lung Association, My COPD Action Plan: www.lung.org/assets/documents/copd/copd-action-plan.pdf  · COPD Foundation: www.copdfoundation.org  · National Heart, Lung, & Blood [employer]: https://www.nhlbi.nih.gov/health-topics/copd  This information is not intended to replace advice given to you by your health care provider. Make sure you discuss any questions you have with your health care provider.  Document Revised: 04/24/2019 Document Reviewed:  05/02/2018  Elsevier Patient Education © 2021 [employer] Inc.

## 2021-06-01 NOTE — PROGRESS NOTES
"  Subjective:     Justa Coles is a 61 y.o. female who presents for  Chief Complaint   Patient presents with   • Pneumonia     2 week follow up      Postmenopausal  female with a concurrent medical history of COPD presents for follow-up of CAP.  Patient began to experience symptoms of weakness in late May and was subsequently admitted for sepsis secondary to community-acquired pneumonia.  Patient ended up leaving AGAINST MEDICAL ADVICE and was seen the same day in office.  Completed abx course. Continues to experience productive cough.     Past Medical Hx:  Past Medical History:   Diagnosis Date   • Hyperlipidemia        Past Surgical Hx:  Past Surgical History:   Procedure Laterality Date   • APPENDECTOMY     •  SECTION  , ,    • EXCISION BAKERS CYST KNEE Left    • POSTPARTUM TUBAL LIGATION     • TONSILLECTOMY         Social History:  she  reports that she quit smoking about 10 years ago. She has never used smokeless tobacco. She reports previous alcohol use. She reports that she does not use drugs.    Current Meds:    Current Outpatient Medications:   •  atorvastatin (LIPITOR) 10 MG tablet, Take 1 tablet by mouth Every Night., Disp: 30 tablet, Rfl: 5  •  DULoxetine (CYMBALTA) 60 MG capsule, Take 1 capsule by mouth Daily., Disp: 30 capsule, Rfl: 5  •  Fluticasone Furoate-Vilanterol (Breo Ellipta) 200-25 MCG/INH inhaler, Inhale 1 puff Daily. Rinse mouth after each use., Disp: 60 each, Rfl: 5  •  vitamin D (ERGOCALCIFEROL) 1.25 MG (62513 UT) capsule capsule, Take 1 capsule by mouth 1 (One) Time Per Week., Disp: 4 capsule, Rfl: 5      Review of Systems  Review of Systems   Respiratory: Positive for cough.        Objective:     /69 (BP Location: Left arm, Patient Position: Sitting, Cuff Size: Small Adult)   Pulse 100   Temp 98 °F (36.7 °C) (Infrared)   Ht 162.6 cm (64\")   Wt 50.3 kg (111 lb)   SpO2 95%   BMI 19.05 kg/m²     Physical Exam  Vitals reviewed. "   Constitutional:       General: She is not in acute distress.     Appearance: She is well-developed. She is cachectic. She is not diaphoretic.   HENT:      Head: Normocephalic and atraumatic.   Cardiovascular:      Rate and Rhythm: Normal rate and regular rhythm.   Pulmonary:      Effort: Pulmonary effort is normal.      Breath sounds: Normal breath sounds.   Skin:     General: Skin is warm and dry.   Neurological:      Mental Status: She is alert and oriented to person, place, and time.   Psychiatric:         Mood and Affect: Mood normal.         Behavior: Behavior normal.         Lab Results   Component Value Date    WBC 10.90 (H) 05/19/2021    HGB 10.8 (L) 05/19/2021    HCT 32.2 (L) 05/19/2021    MCV 88.8 05/19/2021     05/19/2021     Lab Results   Component Value Date    GLUCOSE 98 05/19/2021    BUN 8 05/19/2021    CREATININE 0.47 (L) 05/19/2021    EGFRIFNONA 134 05/19/2021    BCR 17.0 05/19/2021    K 4.1 05/19/2021    CO2 24.0 05/19/2021    CALCIUM 8.5 (L) 05/19/2021    ALBUMIN 3.80 05/17/2021    LABIL2 1.2 01/19/2019    AST 14 05/17/2021    ALT 14 05/17/2021     Lab Results   Component Value Date    CHOL 227 (H) 05/01/2020    TRIG 60 05/01/2020    HDL 56 05/01/2020     (H) 05/01/2020     The 10-year ASCVD risk score (Verena JEROMY Jr., et al., 2013) is: 5.6%    Values used to calculate the score:      Age: 61 years      Sex: Female      Is Non- : No      Diabetic: No      Tobacco smoker: No      Systolic Blood Pressure: 145 mmHg      Is BP treated: No      HDL Cholesterol: 56 mg/dL      Total Cholesterol: 227 mg/dL     Assessment/Plan:     Problem List Items Addressed This Visit        Pulmonary and Pneumonias    Chronic obstructive pulmonary disease (CMS/HCC) - Primary    Overview     Former smoker.  Continue Breo 200-25 mcg daily and as needed albuterol.  Vaccinated against S. Pneumonia & COVID-19.  COPD action plan in AVS.              Follow-up:     Return if symptoms  worsen or fail to improve, for KEEP APPOINTMENT FOR PHYSICAL IN NOVEMBER.

## 2021-06-29 LAB — QT INTERVAL: 339 MS

## 2021-08-07 DIAGNOSIS — E55.9 VITAMIN D DEFICIENCY: ICD-10-CM

## 2021-08-07 DIAGNOSIS — E78.2 MIXED HYPERLIPIDEMIA: ICD-10-CM

## 2021-08-09 RX ORDER — ERGOCALCIFEROL 1.25 MG/1
50000 CAPSULE ORAL WEEKLY
Qty: 12 CAPSULE | Refills: 0 | Status: SHIPPED | OUTPATIENT
Start: 2021-08-09 | End: 2021-11-24 | Stop reason: SDUPTHER

## 2021-08-09 RX ORDER — ATORVASTATIN CALCIUM 10 MG/1
10 TABLET, FILM COATED ORAL NIGHTLY
Qty: 90 TABLET | Refills: 1 | Status: SHIPPED | OUTPATIENT
Start: 2021-08-09 | End: 2022-01-31

## 2021-10-31 DIAGNOSIS — E55.9 VITAMIN D DEFICIENCY: ICD-10-CM

## 2021-10-31 DIAGNOSIS — J44.9 CHRONIC OBSTRUCTIVE PULMONARY DISEASE, UNSPECIFIED COPD TYPE (HCC): ICD-10-CM

## 2021-11-04 RX ORDER — ERGOCALCIFEROL 1.25 MG/1
50000 CAPSULE ORAL WEEKLY
Qty: 4 CAPSULE | Refills: 2 | OUTPATIENT
Start: 2021-11-04

## 2021-11-04 NOTE — TELEPHONE ENCOUNTER
Please call patient and let her know that we received a refill request for her vitamin D supplement.  I would like to have her levels rechecked before refilling prescription.  Labs have been ordered.

## 2021-11-23 NOTE — PATIENT INSTRUCTIONS

## 2021-11-23 NOTE — PROGRESS NOTES
Subjective   Justa Coles is a 61 y.o. female.       HPI   Pt here today for routine physical exam.   Medical/social/family hx reviewed.   Last pap was several years ago; she wants to go to a GYN for update.    Screening mammogram was 9/18/2020.   Has not yet had a screening colonoscopy or Cologuard.   Has not yet had a screening DEXA scan.   Immunizations reviewed.   Has had more down moods since  passed away this year.  Currently taking duloxetine 60 mg daily but has not been consistent with the med.  She denies any SI or HI.  She doesn't want to change meds or try anything new.  She doesn't want to see a counselor.   Needs refill on Vitamin D supplement.     The following portions of the patient's history were reviewed and updated as appropriate: allergies, current medications, past family history, past medical history, past social history, past surgical history and problem list.    Review of Systems   Constitutional: Negative for activity change, appetite change, chills, diaphoresis, fatigue, fever, unexpected weight gain and unexpected weight loss.   Eyes: Negative for visual disturbance.   Respiratory: Negative for cough, chest tightness, shortness of breath and wheezing.    Cardiovascular: Negative for chest pain, palpitations and leg swelling.   Gastrointestinal: Negative for abdominal distention, abdominal pain, blood in stool, constipation, diarrhea, nausea, vomiting and indigestion.   Endocrine: Negative for cold intolerance, heat intolerance, polydipsia, polyphagia and polyuria.   Genitourinary: Negative for dysuria, frequency, hematuria and urgency.   Musculoskeletal: Negative for arthralgias, back pain and myalgias.   Neurological: Negative for dizziness, weakness and headache.   Psychiatric/Behavioral: Positive for depressed mood and stress. Negative for self-injury and suicidal ideas. The patient is not nervous/anxious.        Objective   Physical Exam  Vitals reviewed.   Constitutional:        General: She is not in acute distress.     Appearance: Normal appearance.   HENT:      Head: Normocephalic and atraumatic.      Right Ear: External ear normal.      Left Ear: External ear normal.      Nose: Nose normal.      Mouth/Throat:      Mouth: Mucous membranes are moist.      Pharynx: Oropharynx is clear.   Eyes:      General:         Right eye: No discharge.         Left eye: No discharge.      Conjunctiva/sclera: Conjunctivae normal.   Cardiovascular:      Rate and Rhythm: Normal rate and regular rhythm.      Pulses: Normal pulses.      Heart sounds: Normal heart sounds. No murmur heard.      Pulmonary:      Effort: Pulmonary effort is normal. No respiratory distress.      Breath sounds: Normal breath sounds. No wheezing or rhonchi.   Chest:      Chest wall: No tenderness.   Abdominal:      General: Bowel sounds are normal. There is no distension.      Palpations: Abdomen is soft.      Tenderness: There is no abdominal tenderness. There is no right CVA tenderness or left CVA tenderness.   Musculoskeletal:      Cervical back: Normal range of motion and neck supple. No tenderness.   Skin:     General: Skin is warm and dry.      Findings: No erythema.   Neurological:      General: No focal deficit present.      Mental Status: She is alert and oriented to person, place, and time.   Psychiatric:         Mood and Affect: Mood normal.           Assessment/Plan   Diagnoses and all orders for this visit:    1. Health maintenance examination (Primary)  Comments:  Medical/social/family hx reviewed.   Pt to have pap updated with GYN.   Labs today.   Orders given for screening colonoscopy, mammogram and DEXA scan.    Orders:  -     Comprehensive metabolic panel; Future  -     Lipid panel; Future    2. Other screening mammogram  Comments:  Given order for screening mammogram.   Orders:  -     Mammo Screening Digital Tomosynthesis Bilateral With CAD; Future    3. Colon cancer screening  Comments:  Given order for  screening colonoscopy.   Orders:  -     Ambulatory Referral For Screening Colonoscopy    4. Postmenopausal status  Comments:  Given order for DEXA scan  Orders:  -     DEXA Bone Density Axial    5. Vitamin D deficiency  Comments:  Labs today.   Cont. current medication.   Refills sent.   Orders:  -     vitamin D (ERGOCALCIFEROL) 1.25 MG (62809 UT) capsule capsule; Take 1 capsule by mouth 1 (One) Time Per Week.  Dispense: 12 capsule; Refill: 1  -     Vitamin D 25 hydroxy; Future    6. Major depressive disorder, recurrent, mild (HCC)  Comments:  Discussed being consistent with the current duloxetine.    Pt declines to change medication at this time.   Pt. declines couonsleing at this time.        Overall well-being, sleep habits and weight goals, can be improved by maintaining healthy diet and regular physical activity. Recommended regular dental care, vision care, consistent seatbelt use, and skin protection.  Handout in AVS.

## 2021-11-24 ENCOUNTER — LAB (OUTPATIENT)
Dept: FAMILY MEDICINE CLINIC | Facility: CLINIC | Age: 61
End: 2021-11-24

## 2021-11-24 ENCOUNTER — OFFICE VISIT (OUTPATIENT)
Dept: FAMILY MEDICINE CLINIC | Facility: CLINIC | Age: 61
End: 2021-11-24

## 2021-11-24 VITALS
SYSTOLIC BLOOD PRESSURE: 136 MMHG | OXYGEN SATURATION: 95 % | BODY MASS INDEX: 19.12 KG/M2 | DIASTOLIC BLOOD PRESSURE: 83 MMHG | WEIGHT: 112 LBS | HEIGHT: 64 IN | HEART RATE: 101 BPM

## 2021-11-24 DIAGNOSIS — Z00.00 HEALTH MAINTENANCE EXAMINATION: Primary | ICD-10-CM

## 2021-11-24 DIAGNOSIS — F33.0 MAJOR DEPRESSIVE DISORDER, RECURRENT, MILD (HCC): ICD-10-CM

## 2021-11-24 DIAGNOSIS — Z12.11 COLON CANCER SCREENING: ICD-10-CM

## 2021-11-24 DIAGNOSIS — E55.9 VITAMIN D DEFICIENCY: ICD-10-CM

## 2021-11-24 DIAGNOSIS — Z12.31 OTHER SCREENING MAMMOGRAM: ICD-10-CM

## 2021-11-24 DIAGNOSIS — Z78.0 POSTMENOPAUSAL STATUS: ICD-10-CM

## 2021-11-24 DIAGNOSIS — Z00.00 HEALTH MAINTENANCE EXAMINATION: ICD-10-CM

## 2021-11-24 LAB
25(OH)D3 SERPL-MCNC: 51.3 NG/ML
ALBUMIN SERPL-MCNC: 4.3 G/DL (ref 3.5–5.2)
ALBUMIN/GLOB SERPL: 1.6 G/DL
ALP SERPL-CCNC: 94 U/L (ref 39–117)
ALT SERPL W P-5'-P-CCNC: 18 U/L (ref 1–33)
ANION GAP SERPL CALCULATED.3IONS-SCNC: 8.2 MMOL/L (ref 5–15)
AST SERPL-CCNC: 22 U/L (ref 1–32)
BILIRUB SERPL-MCNC: 0.3 MG/DL (ref 0–1.2)
BUN SERPL-MCNC: 16 MG/DL (ref 8–23)
BUN/CREAT SERPL: 18.4 (ref 7–25)
CALCIUM SPEC-SCNC: 9.7 MG/DL (ref 8.6–10.5)
CHLORIDE SERPL-SCNC: 106 MMOL/L (ref 98–107)
CHOLEST SERPL-MCNC: 195 MG/DL (ref 0–200)
CO2 SERPL-SCNC: 30.8 MMOL/L (ref 22–29)
CREAT SERPL-MCNC: 0.87 MG/DL (ref 0.57–1)
GFR SERPL CREATININE-BSD FRML MDRD: 66 ML/MIN/1.73
GLOBULIN UR ELPH-MCNC: 2.7 GM/DL
GLUCOSE SERPL-MCNC: 96 MG/DL (ref 65–99)
HDLC SERPL-MCNC: 51 MG/DL (ref 40–60)
LDLC SERPL CALC-MCNC: 125 MG/DL (ref 0–100)
LDLC/HDLC SERPL: 2.41 {RATIO}
POTASSIUM SERPL-SCNC: 4.9 MMOL/L (ref 3.5–5.2)
PROT SERPL-MCNC: 7 G/DL (ref 6–8.5)
SODIUM SERPL-SCNC: 145 MMOL/L (ref 136–145)
TRIGL SERPL-MCNC: 106 MG/DL (ref 0–150)
VLDLC SERPL-MCNC: 19 MG/DL (ref 5–40)

## 2021-11-24 PROCEDURE — 36415 COLL VENOUS BLD VENIPUNCTURE: CPT | Performed by: FAMILY MEDICINE

## 2021-11-24 PROCEDURE — 80061 LIPID PANEL: CPT | Performed by: NURSE PRACTITIONER

## 2021-11-24 PROCEDURE — 80053 COMPREHEN METABOLIC PANEL: CPT | Performed by: NURSE PRACTITIONER

## 2021-11-24 PROCEDURE — 99396 PREV VISIT EST AGE 40-64: CPT | Performed by: NURSE PRACTITIONER

## 2021-11-24 PROCEDURE — 82306 VITAMIN D 25 HYDROXY: CPT | Performed by: FAMILY MEDICINE

## 2021-11-24 RX ORDER — ALBUTEROL SULFATE 90 UG/1
AEROSOL, METERED RESPIRATORY (INHALATION)
COMMUNITY
Start: 2021-11-04 | End: 2022-01-04

## 2021-11-24 RX ORDER — ERGOCALCIFEROL 1.25 MG/1
50000 CAPSULE ORAL WEEKLY
Qty: 12 CAPSULE | Refills: 1 | Status: SHIPPED | OUTPATIENT
Start: 2021-11-24 | End: 2022-04-08

## 2021-12-31 DIAGNOSIS — J44.9 CHRONIC OBSTRUCTIVE PULMONARY DISEASE, UNSPECIFIED: ICD-10-CM

## 2022-01-04 RX ORDER — ALBUTEROL SULFATE 90 UG/1
2 AEROSOL, METERED RESPIRATORY (INHALATION) EVERY 6 HOURS PRN
Qty: 18 G | Refills: 5 | Status: SHIPPED | OUTPATIENT
Start: 2022-01-04 | End: 2022-06-20

## 2022-01-15 ENCOUNTER — HOSPITAL ENCOUNTER (OUTPATIENT)
Dept: MAMMOGRAPHY | Facility: HOSPITAL | Age: 62
Discharge: HOME | End: 2022-01-15
Admitting: NURSE PRACTITIONER

## 2022-01-15 DIAGNOSIS — Z12.31 OTHER SCREENING MAMMOGRAM: ICD-10-CM

## 2022-01-15 PROCEDURE — 77067 SCR MAMMO BI INCL CAD: CPT

## 2022-01-15 PROCEDURE — 77063 BREAST TOMOSYNTHESIS BI: CPT

## 2022-01-30 DIAGNOSIS — E78.2 MIXED HYPERLIPIDEMIA: ICD-10-CM

## 2022-01-31 RX ORDER — ATORVASTATIN CALCIUM 10 MG/1
10 TABLET, FILM COATED ORAL NIGHTLY
Qty: 90 TABLET | Refills: 1 | Status: SHIPPED | OUTPATIENT
Start: 2022-01-31 | End: 2022-11-21 | Stop reason: SDUPTHER

## 2022-03-31 DIAGNOSIS — F41.8 MIXED ANXIETY DEPRESSIVE DISORDER: Primary | ICD-10-CM

## 2022-04-01 RX ORDER — DULOXETIN HYDROCHLORIDE 60 MG/1
60 CAPSULE, DELAYED RELEASE ORAL DAILY
Qty: 90 CAPSULE | Refills: 1 | Status: SHIPPED | OUTPATIENT
Start: 2022-04-01 | End: 2022-12-23 | Stop reason: SDUPTHER

## 2022-04-08 DIAGNOSIS — E55.9 VITAMIN D DEFICIENCY: ICD-10-CM

## 2022-04-08 RX ORDER — ERGOCALCIFEROL 1.25 MG/1
50000 CAPSULE ORAL
Qty: 12 CAPSULE | Refills: 0 | Status: SHIPPED | OUTPATIENT
Start: 2022-04-08 | End: 2022-07-13

## 2022-04-28 DIAGNOSIS — J44.9 CHRONIC OBSTRUCTIVE PULMONARY DISEASE, UNSPECIFIED COPD TYPE: ICD-10-CM

## 2022-06-19 DIAGNOSIS — J44.9 CHRONIC OBSTRUCTIVE PULMONARY DISEASE, UNSPECIFIED: ICD-10-CM

## 2022-06-20 RX ORDER — ALBUTEROL SULFATE 90 UG/1
2 AEROSOL, METERED RESPIRATORY (INHALATION) EVERY 6 HOURS PRN
Qty: 18 G | Refills: 5 | Status: SHIPPED | OUTPATIENT
Start: 2022-06-20

## 2022-06-21 ENCOUNTER — TELEPHONE (OUTPATIENT)
Dept: FAMILY MEDICINE CLINIC | Facility: CLINIC | Age: 62
End: 2022-06-21

## 2022-06-21 DIAGNOSIS — J44.9 CHRONIC OBSTRUCTIVE PULMONARY DISEASE, UNSPECIFIED COPD TYPE: Primary | ICD-10-CM

## 2022-06-21 NOTE — TELEPHONE ENCOUNTER
[facility] states that albuterol HFA isn't covered through her insurance. But they will cover Levalbuterol Tar HFA 45 mcg inhaler.

## 2022-06-22 RX ORDER — LEVALBUTEROL TARTRATE 45 UG/1
1-2 AEROSOL, METERED ORAL EVERY 6 HOURS PRN
Qty: 15 G | Refills: 11 | Status: SHIPPED | OUTPATIENT
Start: 2022-06-22

## 2022-06-27 ENCOUNTER — TELEPHONE (OUTPATIENT)
Dept: FAMILY MEDICINE CLINIC | Facility: CLINIC | Age: 62
End: 2022-06-27

## 2022-06-27 DIAGNOSIS — J44.9 CHRONIC OBSTRUCTIVE PULMONARY DISEASE, UNSPECIFIED COPD TYPE: ICD-10-CM

## 2022-06-27 NOTE — TELEPHONE ENCOUNTER
"Phone call to Linda. Informed her of Dr. Ackerman's recommendation to go to the [facility] ED for a medical assessment and [facility] services. Linda confirmed she knows where the [facility] ED is and said \"I don't think it is that bad. I'll think about it. Thank you.\"    Routing to Dr. Ackerman as FYI.    Veena Luke RN on 2/18/2022 at 4:03 PM    " Dr. Pretty patient.

## 2022-06-27 NOTE — TELEPHONE ENCOUNTER
Spoke to pt and let her know that as of now nothing is available that day but I'll keep a look out if anything does.

## 2022-06-27 NOTE — TELEPHONE ENCOUNTER
Caller: Justa Coles    Relationship to patient: Self    Best call back number: [phone]     Chief complaint: OVERALL NOT FEELING WELL    Type of visit: OFFICE VISIT    Requested date: 7/5/22    Additional notes: PATIENT IS OFF WORK THIS DAY AND CAN ONLY COME IN ON THIS DATE AND WANTS TO KNOW IF SHE CAN BE WORKED IN TO DR. BARNES'S SCHEDULE OR ONE OF THE APRNS TO BE SEEN.    PLEASE ADVISE

## 2022-06-27 NOTE — TELEPHONE ENCOUNTER
I am not good with scheduling can you see if anything is available for the 5th of July for pt?  As far as the reason for visit I am not sure what she needs to be seen for.

## 2022-06-27 NOTE — TELEPHONE ENCOUNTER
Caller: Justa Coles    Relationship: Self    Best call back number: [phone]    What is the best time to reach you: ANY TIME    Who are you requesting to speak with (clinical staff, provider,  specific staff member): CLINICAL STAFF    What was the call regarding: PATIENT CALLED STATING THE GENERIC FORM OF Breo Ellipta 200-25 MCG/INH inhaler WAS CALLED IN TO THE PHARMACY BUT INSURANCE WILL NOT COVER THE GENERIC, ONLY NAME BRAND. SHE NEEDS A NEW PRESCRIPTION CALLED IN FOR NAME BRAND ONLY OF THIS MEDICATION.    [facility] [id number] [place] [address] - [phone]  - [phone] FX    PLEASE ADVISE    Do you require a callback: YES

## 2022-06-28 ENCOUNTER — TELEPHONE (OUTPATIENT)
Dept: FAMILY MEDICINE CLINIC | Facility: CLINIC | Age: 62
End: 2022-06-28

## 2022-06-28 DIAGNOSIS — J44.9 CHRONIC OBSTRUCTIVE PULMONARY DISEASE, UNSPECIFIED COPD TYPE: ICD-10-CM

## 2022-07-13 DIAGNOSIS — E55.9 VITAMIN D DEFICIENCY: ICD-10-CM

## 2022-07-13 RX ORDER — ERGOCALCIFEROL 1.25 MG/1
50000 CAPSULE ORAL
Qty: 4 CAPSULE | Refills: 0 | Status: SHIPPED | OUTPATIENT
Start: 2022-07-13 | End: 2022-08-15

## 2022-07-13 NOTE — TELEPHONE ENCOUNTER
Will she need an appt to be seen to get lab orders or does she just need to get labs done? Thanks!

## 2022-07-13 NOTE — TELEPHONE ENCOUNTER
Please call patient and let her know that she will need to have blood work before her next refill of vitamin D supplement.

## 2022-07-13 NOTE — TELEPHONE ENCOUNTER
Please call patient and let her know that I recommend scheduling an appointment as she is approaching a year since her last visit.

## 2022-08-01 ENCOUNTER — OFFICE VISIT (OUTPATIENT)
Dept: FAMILY MEDICINE CLINIC | Facility: CLINIC | Age: 62
End: 2022-08-01

## 2022-08-01 ENCOUNTER — LAB (OUTPATIENT)
Dept: FAMILY MEDICINE CLINIC | Facility: CLINIC | Age: 62
End: 2022-08-01

## 2022-08-01 VITALS
WEIGHT: 107 LBS | RESPIRATION RATE: 16 BRPM | BODY MASS INDEX: 18.27 KG/M2 | DIASTOLIC BLOOD PRESSURE: 60 MMHG | HEART RATE: 80 BPM | TEMPERATURE: 97.1 F | OXYGEN SATURATION: 96 % | SYSTOLIC BLOOD PRESSURE: 128 MMHG | HEIGHT: 64 IN

## 2022-08-01 DIAGNOSIS — Z13.820 ENCOUNTER FOR OSTEOPOROSIS SCREENING IN ASYMPTOMATIC POSTMENOPAUSAL PATIENT: ICD-10-CM

## 2022-08-01 DIAGNOSIS — Z78.0 ENCOUNTER FOR OSTEOPOROSIS SCREENING IN ASYMPTOMATIC POSTMENOPAUSAL PATIENT: ICD-10-CM

## 2022-08-01 DIAGNOSIS — E78.2 MIXED HYPERLIPIDEMIA: ICD-10-CM

## 2022-08-01 DIAGNOSIS — Z12.11 ENCOUNTER FOR SCREENING COLONOSCOPY: ICD-10-CM

## 2022-08-01 DIAGNOSIS — Z00.00 ROUTINE ADULT HEALTH MAINTENANCE: Primary | ICD-10-CM

## 2022-08-01 DIAGNOSIS — R94.5 LIVER FUNCTION ABNORMALITY: Primary | ICD-10-CM

## 2022-08-01 DIAGNOSIS — E55.9 VITAMIN D DEFICIENCY: ICD-10-CM

## 2022-08-01 DIAGNOSIS — D72.821 MONOCYTOSIS: ICD-10-CM

## 2022-08-01 LAB
25(OH)D3 SERPL-MCNC: 67.8 NG/ML (ref 30–100)
ALBUMIN SERPL-MCNC: 3.8 G/DL (ref 3.5–5.2)
ALBUMIN/GLOB SERPL: 1.4 G/DL
ALP SERPL-CCNC: 133 U/L (ref 39–117)
ALT SERPL W P-5'-P-CCNC: 66 U/L (ref 1–33)
ANION GAP SERPL CALCULATED.3IONS-SCNC: 12.4 MMOL/L (ref 5–15)
AST SERPL-CCNC: 45 U/L (ref 1–32)
BASOPHILS # BLD AUTO: 0.12 10*3/MM3 (ref 0–0.2)
BASOPHILS NFR BLD AUTO: 1.2 % (ref 0–1.5)
BILIRUB SERPL-MCNC: 0.2 MG/DL (ref 0–1.2)
BUN SERPL-MCNC: 17 MG/DL (ref 8–23)
BUN/CREAT SERPL: 37.8 (ref 7–25)
CALCIUM SPEC-SCNC: 9.3 MG/DL (ref 8.6–10.5)
CHLORIDE SERPL-SCNC: 103 MMOL/L (ref 98–107)
CHOLEST SERPL-MCNC: 149 MG/DL (ref 0–200)
CO2 SERPL-SCNC: 25.6 MMOL/L (ref 22–29)
CREAT SERPL-MCNC: 0.45 MG/DL (ref 0.57–1)
DEPRECATED RDW RBC AUTO: 40.2 FL (ref 37–54)
EGFRCR SERPLBLD CKD-EPI 2021: 108.3 ML/MIN/1.73
EOSINOPHIL # BLD AUTO: 0.46 10*3/MM3 (ref 0–0.4)
EOSINOPHIL NFR BLD AUTO: 4.6 % (ref 0.3–6.2)
ERYTHROCYTE [DISTWIDTH] IN BLOOD BY AUTOMATED COUNT: 13.2 % (ref 12.3–15.4)
GLOBULIN UR ELPH-MCNC: 2.7 GM/DL
GLUCOSE SERPL-MCNC: 86 MG/DL (ref 65–99)
HCT VFR BLD AUTO: 38.5 % (ref 34–46.6)
HDLC SERPL-MCNC: 55 MG/DL (ref 40–60)
HGB BLD-MCNC: 13.1 G/DL (ref 12–15.9)
IMM GRANULOCYTES # BLD AUTO: 0.02 10*3/MM3 (ref 0–0.05)
IMM GRANULOCYTES NFR BLD AUTO: 0.2 % (ref 0–0.5)
LDLC SERPL CALC-MCNC: 65 MG/DL (ref 0–100)
LDLC/HDLC SERPL: 1.07 {RATIO}
LYMPHOCYTES # BLD AUTO: 2.46 10*3/MM3 (ref 0.7–3.1)
LYMPHOCYTES NFR BLD AUTO: 24.4 % (ref 19.6–45.3)
MCH RBC QN AUTO: 29 PG (ref 26.6–33)
MCHC RBC AUTO-ENTMCNC: 34 G/DL (ref 31.5–35.7)
MCV RBC AUTO: 85.2 FL (ref 79–97)
MONOCYTES # BLD AUTO: 1.36 10*3/MM3 (ref 0.1–0.9)
MONOCYTES NFR BLD AUTO: 13.5 % (ref 5–12)
NEUTROPHILS NFR BLD AUTO: 5.65 10*3/MM3 (ref 1.7–7)
NEUTROPHILS NFR BLD AUTO: 56.1 % (ref 42.7–76)
NRBC BLD AUTO-RTO: 0 /100 WBC (ref 0–0.2)
PLATELET # BLD AUTO: 384 10*3/MM3 (ref 140–450)
PMV BLD AUTO: 10.8 FL (ref 6–12)
POTASSIUM SERPL-SCNC: 4.3 MMOL/L (ref 3.5–5.2)
PROT SERPL-MCNC: 6.5 G/DL (ref 6–8.5)
RBC # BLD AUTO: 4.52 10*6/MM3 (ref 3.77–5.28)
SODIUM SERPL-SCNC: 141 MMOL/L (ref 136–145)
TRIGL SERPL-MCNC: 177 MG/DL (ref 0–150)
VLDLC SERPL-MCNC: 29 MG/DL (ref 5–40)
WBC NRBC COR # BLD: 10.07 10*3/MM3 (ref 3.4–10.8)

## 2022-08-01 PROCEDURE — 80053 COMPREHEN METABOLIC PANEL: CPT | Performed by: PHYSICIAN ASSISTANT

## 2022-08-01 PROCEDURE — 85025 COMPLETE CBC W/AUTO DIFF WBC: CPT | Performed by: PHYSICIAN ASSISTANT

## 2022-08-01 PROCEDURE — 99396 PREV VISIT EST AGE 40-64: CPT | Performed by: PHYSICIAN ASSISTANT

## 2022-08-01 PROCEDURE — 82306 VITAMIN D 25 HYDROXY: CPT | Performed by: PHYSICIAN ASSISTANT

## 2022-08-01 PROCEDURE — 80061 LIPID PANEL: CPT | Performed by: PHYSICIAN ASSISTANT

## 2022-08-01 PROCEDURE — 36415 COLL VENOUS BLD VENIPUNCTURE: CPT | Performed by: PHYSICIAN ASSISTANT

## 2022-08-01 NOTE — PROGRESS NOTES
Subjective   Justa Coles is a 62 y.o. female.     Pt presents for annual exam needed for her work.  She denies any current concerns other than rib pain from recently bending forward over the back of a chair and feeling/hearing her ribs break.  She likely has osteoporosis but hasn't had her dexa scan done that was ordered in November.  She quit smoking about 10-11 years ago.  She denies any current problems with her breathing other than having pain in ribs with deep breaths but usually has a harder time in  due to her allergies.         The following portions of the patient's history were reviewed and updated as appropriate: allergies, current medications, past family history, past medical history, past social history, past surgical history and problem list.  Past Medical History:   Diagnosis Date   • Hyperlipidemia      Past Surgical History:   Procedure Laterality Date   • APPENDECTOMY     • BREAST BIOPSY Left 2020    stereo   •  SECTION  , ,    • EXCISION BAKERS CYST KNEE Left    • POSTPARTUM TUBAL LIGATION     • TONSILLECTOMY       Family History   Problem Relation Age of Onset   • Colon cancer Father    • Colon cancer Maternal Grandmother    • Colon cancer Maternal Grandfather    • Colon cancer Paternal Grandfather      Social History     Socioeconomic History   • Marital status:    Tobacco Use   • Smoking status: Former Smoker     Quit date: 2011     Years since quittin.1   • Smokeless tobacco: Never Used   Substance and Sexual Activity   • Alcohol use: Not Currently   • Drug use: Never         Current Outpatient Medications:   •  albuterol sulfate  (90 Base) MCG/ACT inhaler, Inhale 2 puffs Every 6 (Six) Hours As Needed for Wheezing or Shortness of Air., Disp: 18 g, Rfl: 5  •  atorvastatin (LIPITOR) 10 MG tablet, Take 1 tablet by mouth Every Night., Disp: 90 tablet, Rfl: 1  •  Breo Ellipta 200-25 MCG/INH inhaler, Inhale 1 puff Daily., Disp: 60 each,  "Rfl: 5  •  DULoxetine (CYMBALTA) 60 MG capsule, Take 1 capsule by mouth Daily., Disp: 90 capsule, Rfl: 1  •  levalbuterol (XOPENEX HFA) 45 MCG/ACT inhaler, Inhale 1-2 puffs Every 6 (Six) Hours As Needed for Wheezing or Shortness of Air., Disp: 15 g, Rfl: 11  •  vitamin D (ERGOCALCIFEROL) 1.25 MG (32975 UT) capsule capsule, Take 1 capsule by mouth Every 7 (Seven) Days. NEED BLOOD WORK BEFORE NEXT REFILL., Disp: 4 capsule, Rfl: 0    Review of Systems   Constitutional: Negative for activity change, appetite change, chills, diaphoresis, fatigue, fever, unexpected weight gain and unexpected weight loss.   HENT: Negative for trouble swallowing.    Respiratory: Negative for chest tightness, shortness of breath and wheezing.    Cardiovascular: Negative for chest pain, palpitations and leg swelling.   Gastrointestinal: Negative for abdominal pain, constipation, diarrhea, nausea and vomiting.   Genitourinary: Negative for difficulty urinating and dysuria.   Musculoskeletal: Positive for arthralgias. Negative for gait problem.   Neurological: Negative for dizziness, tremors, weakness, light-headedness, headache and confusion.   Psychiatric/Behavioral: Positive for sleep disturbance.     /60 (BP Location: Left arm, Patient Position: Sitting, Cuff Size: Adult)   Pulse 80   Temp 97.1 °F (36.2 °C) (Temporal)   Resp 16   Ht 162.6 cm (64\")   Wt 48.5 kg (107 lb)   SpO2 96%   BMI 18.37 kg/m²       Objective   Physical Exam  Vitals and nursing note reviewed.   Constitutional:       General: She is not in acute distress.     Appearance: Normal appearance. She is normal weight.   HENT:      Head: Normocephalic and atraumatic.      Right Ear: Tympanic membrane, ear canal and external ear normal.      Left Ear: Tympanic membrane, ear canal and external ear normal.      Nose: Nose normal.      Mouth/Throat:      Mouth: Mucous membranes are moist.      Pharynx: Oropharynx is clear.   Eyes:      Extraocular Movements: Extraocular " movements intact.      Conjunctiva/sclera: Conjunctivae normal.      Pupils: Pupils are equal, round, and reactive to light.   Neck:      Thyroid: No thyroid mass, thyromegaly or thyroid tenderness.   Cardiovascular:      Rate and Rhythm: Normal rate and regular rhythm.      Heart sounds: Normal heart sounds.   Pulmonary:      Effort: Pulmonary effort is normal. No respiratory distress.      Breath sounds: Normal breath sounds. No wheezing, rhonchi or rales.      Comments: Pain in ribs especially on right side.  Chest:      Chest wall: Tenderness present.   Abdominal:      General: Abdomen is flat. Bowel sounds are normal. There is no distension.      Palpations: Abdomen is soft. There is no mass.      Tenderness: There is no abdominal tenderness.   Musculoskeletal:         General: Normal range of motion.      Cervical back: Normal range of motion and neck supple.      Right lower leg: No edema.      Left lower leg: No edema.   Skin:     General: Skin is warm and dry.   Neurological:      General: No focal deficit present.      Mental Status: She is alert and oriented to person, place, and time.   Psychiatric:         Mood and Affect: Mood normal.         Behavior: Behavior normal.         Thought Content: Thought content normal.         Procedures       Diagnoses and all orders for this visit:    1. Routine adult health maintenance (Primary)  Comments:  Routine exam done today.  Exercise at least 20 minutes daily (once rib pain has improved) and work on healthy diet.  Orders:  -     Comprehensive Metabolic Panel  -     Lipid Panel  -     Vitamin D 25 Hydroxy  -     CBC w AUTO Differential    2. Encounter for screening colonoscopy  Comments:  Pt to get colonoscopy done at her convenience.  Orders:  -     Ambulatory Referral For Screening Colonoscopy    3. Encounter for osteoporosis screening in asymptomatic postmenopausal patient  Comments:  Pt to get dexa scan done.  Orders:  -     DEXA Bone Density Axial;  Future    4. Vitamin D deficiency  Comments:  Will check labs today and notify with results.  Continue vitamin D supplement.  Orders:  -     Vitamin D 25 Hydroxy    5. Mixed hyperlipidemia  Comments:  Will check labs and continue medication.  Work on low saturated and high fiber diet.  Orders:  -     Comprehensive Metabolic Panel  -     Lipid Panel

## 2022-08-12 DIAGNOSIS — E55.9 VITAMIN D DEFICIENCY: ICD-10-CM

## 2022-08-15 RX ORDER — ERGOCALCIFEROL 1.25 MG/1
50000 CAPSULE ORAL
Qty: 6 CAPSULE | Refills: 0 | Status: SHIPPED | OUTPATIENT
Start: 2022-08-15 | End: 2022-11-15

## 2022-09-10 DIAGNOSIS — E55.9 VITAMIN D DEFICIENCY: ICD-10-CM

## 2022-09-12 RX ORDER — ERGOCALCIFEROL 1.25 MG/1
CAPSULE ORAL
Qty: 2 CAPSULE | Refills: 2 | OUTPATIENT
Start: 2022-09-12

## 2022-09-12 NOTE — TELEPHONE ENCOUNTER
Please call patient and let her know that vitamin D level was within appropriate range in August 2022. I recommend taking over the counter vitamin D 1000 IU daily.

## 2022-11-04 ENCOUNTER — LAB (OUTPATIENT)
Dept: FAMILY MEDICINE CLINIC | Facility: CLINIC | Age: 62
End: 2022-11-04

## 2022-11-04 ENCOUNTER — OFFICE VISIT (OUTPATIENT)
Dept: FAMILY MEDICINE CLINIC | Facility: CLINIC | Age: 62
End: 2022-11-04

## 2022-11-04 VITALS
SYSTOLIC BLOOD PRESSURE: 145 MMHG | TEMPERATURE: 97.1 F | WEIGHT: 103 LBS | RESPIRATION RATE: 16 BRPM | DIASTOLIC BLOOD PRESSURE: 68 MMHG | HEART RATE: 105 BPM | BODY MASS INDEX: 17.58 KG/M2 | HEIGHT: 64 IN | OXYGEN SATURATION: 96 %

## 2022-11-04 DIAGNOSIS — R79.89 ABNORMAL TSH: ICD-10-CM

## 2022-11-04 DIAGNOSIS — R63.4 WEIGHT LOSS: ICD-10-CM

## 2022-11-04 DIAGNOSIS — R94.5 LIVER FUNCTION ABNORMALITY: Primary | ICD-10-CM

## 2022-11-04 DIAGNOSIS — R94.5 LIVER FUNCTION ABNORMALITY: ICD-10-CM

## 2022-11-04 DIAGNOSIS — F33.0 MAJOR DEPRESSIVE DISORDER, RECURRENT, MILD: ICD-10-CM

## 2022-11-04 DIAGNOSIS — D72.821 MONOCYTOSIS: ICD-10-CM

## 2022-11-04 LAB
ALBUMIN SERPL-MCNC: 4.1 G/DL (ref 3.5–5.2)
ALP SERPL-CCNC: 115 U/L (ref 39–117)
ALT SERPL W P-5'-P-CCNC: 18 U/L (ref 1–33)
AST SERPL-CCNC: 19 U/L (ref 1–32)
BASOPHILS # BLD AUTO: 0.11 10*3/MM3 (ref 0–0.2)
BASOPHILS NFR BLD AUTO: 1.3 % (ref 0–1.5)
BILIRUB CONJ SERPL-MCNC: <0.2 MG/DL (ref 0–0.3)
BILIRUB INDIRECT SERPL-MCNC: NORMAL MG/DL
BILIRUB SERPL-MCNC: 0.2 MG/DL (ref 0–1.2)
DEPRECATED RDW RBC AUTO: 38.4 FL (ref 37–54)
EOSINOPHIL # BLD AUTO: 0.35 10*3/MM3 (ref 0–0.4)
EOSINOPHIL NFR BLD AUTO: 4.2 % (ref 0.3–6.2)
ERYTHROCYTE [DISTWIDTH] IN BLOOD BY AUTOMATED COUNT: 12.9 % (ref 12.3–15.4)
GGT SERPL-CCNC: 28 U/L (ref 5–36)
HCT VFR BLD AUTO: 39.6 % (ref 34–46.6)
HGB BLD-MCNC: 13.4 G/DL (ref 12–15.9)
IMM GRANULOCYTES # BLD AUTO: 0.01 10*3/MM3 (ref 0–0.05)
IMM GRANULOCYTES NFR BLD AUTO: 0.1 % (ref 0–0.5)
LYMPHOCYTES # BLD AUTO: 2.61 10*3/MM3 (ref 0.7–3.1)
LYMPHOCYTES NFR BLD AUTO: 31.6 % (ref 19.6–45.3)
MCH RBC QN AUTO: 28.1 PG (ref 26.6–33)
MCHC RBC AUTO-ENTMCNC: 33.8 G/DL (ref 31.5–35.7)
MCV RBC AUTO: 83 FL (ref 79–97)
MONOCYTES # BLD AUTO: 1.04 10*3/MM3 (ref 0.1–0.9)
MONOCYTES NFR BLD AUTO: 12.6 % (ref 5–12)
NEUTROPHILS NFR BLD AUTO: 4.13 10*3/MM3 (ref 1.7–7)
NEUTROPHILS NFR BLD AUTO: 50.2 % (ref 42.7–76)
NRBC BLD AUTO-RTO: 0 /100 WBC (ref 0–0.2)
PLATELET # BLD AUTO: 408 10*3/MM3 (ref 140–450)
PMV BLD AUTO: 10.1 FL (ref 6–12)
PROT SERPL-MCNC: 6.6 G/DL (ref 6–8.5)
RBC # BLD AUTO: 4.77 10*6/MM3 (ref 3.77–5.28)
WBC NRBC COR # BLD: 8.25 10*3/MM3 (ref 3.4–10.8)

## 2022-11-04 PROCEDURE — 36415 COLL VENOUS BLD VENIPUNCTURE: CPT

## 2022-11-04 PROCEDURE — 99214 OFFICE O/P EST MOD 30 MIN: CPT | Performed by: PHYSICIAN ASSISTANT

## 2022-11-04 PROCEDURE — 82977 ASSAY OF GGT: CPT | Performed by: PHYSICIAN ASSISTANT

## 2022-11-04 PROCEDURE — 84439 ASSAY OF FREE THYROXINE: CPT | Performed by: PHYSICIAN ASSISTANT

## 2022-11-04 PROCEDURE — 84481 FREE ASSAY (FT-3): CPT | Performed by: PHYSICIAN ASSISTANT

## 2022-11-04 PROCEDURE — 85025 COMPLETE CBC W/AUTO DIFF WBC: CPT | Performed by: PHYSICIAN ASSISTANT

## 2022-11-04 PROCEDURE — 80076 HEPATIC FUNCTION PANEL: CPT | Performed by: PHYSICIAN ASSISTANT

## 2022-11-04 PROCEDURE — 84443 ASSAY THYROID STIM HORMONE: CPT | Performed by: PHYSICIAN ASSISTANT

## 2022-11-04 NOTE — PROGRESS NOTES
Subjective   Justa Coles is a 62 y.o. female.     History of Present Illness  Pt presents to follow up on previously abnormal liver function.  She was taking more tylenol prior to last labs but she has discontinued that.  She has still had some issues with fatigue and depression but she doesn't want to add any medication at this time. She hasn't had her dexa scan done or mammogram due to schedule with work.  She also knows that she needs to have her colonoscopy done also.  She has had some weight loss over the last few visits also.         The following portions of the patient's history were reviewed and updated as appropriate: allergies, current medications, past family history, past medical history, past social history, past surgical history and problem list.  Past Medical History:   Diagnosis Date   • Hyperlipidemia      Past Surgical History:   Procedure Laterality Date   • APPENDECTOMY     • BREAST BIOPSY Left 2020    stereo   •  SECTION  , ,    • EXCISION BAKERS CYST KNEE Left    • POSTPARTUM TUBAL LIGATION     • TONSILLECTOMY       Family History   Problem Relation Age of Onset   • Colon cancer Father    • Colon cancer Maternal Grandmother    • Colon cancer Maternal Grandfather    • Colon cancer Paternal Grandfather      Social History     Socioeconomic History   • Marital status:    Tobacco Use   • Smoking status: Former     Types: Cigarettes     Quit date: 2011     Years since quittin.4   • Smokeless tobacco: Never   Substance and Sexual Activity   • Alcohol use: Not Currently   • Drug use: Never         Current Outpatient Medications:   •  albuterol sulfate  (90 Base) MCG/ACT inhaler, Inhale 2 puffs Every 6 (Six) Hours As Needed for Wheezing or Shortness of Air., Disp: 18 g, Rfl: 5  •  atorvastatin (LIPITOR) 10 MG tablet, Take 1 tablet by mouth Every Night., Disp: 90 tablet, Rfl: 1  •  Breo Ellipta 200-25 MCG/INH inhaler, Inhale 1 puff Daily., Disp:  "60 each, Rfl: 5  •  DULoxetine (CYMBALTA) 60 MG capsule, Take 1 capsule by mouth Daily., Disp: 90 capsule, Rfl: 1  •  levalbuterol (XOPENEX HFA) 45 MCG/ACT inhaler, Inhale 1-2 puffs Every 6 (Six) Hours As Needed for Wheezing or Shortness of Air., Disp: 15 g, Rfl: 11  •  vitamin D (ERGOCALCIFEROL) 1.25 MG (36963 UT) capsule capsule, Take 1 capsule by mouth Every 14 (Fourteen) Days., Disp: 6 capsule, Rfl: 0    Review of Systems   Constitutional: Positive for fatigue and unexpected weight loss. Negative for activity change, appetite change, chills, diaphoresis, fever and unexpected weight gain.   HENT: Negative for trouble swallowing.    Respiratory: Negative for chest tightness, shortness of breath and wheezing.    Cardiovascular: Negative for chest pain, palpitations and leg swelling.   Gastrointestinal: Negative for abdominal pain, anal bleeding, blood in stool, constipation, diarrhea, nausea and vomiting.   Musculoskeletal: Positive for arthralgias and back pain. Negative for gait problem.   Neurological: Negative for dizziness, tremors, syncope, weakness, light-headedness, headache and confusion.   Psychiatric/Behavioral: Positive for depressed mood and stress. Negative for self-injury, sleep disturbance and suicidal ideas.     /68 (BP Location: Left arm, Patient Position: Sitting, Cuff Size: Adult)   Pulse 105   Temp 97.1 °F (36.2 °C) (Temporal)   Resp 16   Ht 162.6 cm (64\")   Wt 46.7 kg (103 lb)   SpO2 96%   BMI 17.68 kg/m²       Objective   Physical Exam  Vitals and nursing note reviewed.   Constitutional:       Appearance: Normal appearance.   HENT:      Head: Normocephalic and atraumatic.   Eyes:      Pupils: Pupils are equal, round, and reactive to light.   Neck:      Thyroid: No thyroid mass, thyromegaly or thyroid tenderness.      Vascular: No carotid bruit.   Cardiovascular:      Rate and Rhythm: Normal rate and regular rhythm.      Heart sounds: Normal heart sounds.   Pulmonary:      Effort: " Pulmonary effort is normal.      Breath sounds: Normal breath sounds.   Abdominal:      General: Abdomen is flat. Bowel sounds are normal.      Palpations: Abdomen is soft.   Musculoskeletal:      Cervical back: Normal range of motion and neck supple.      Right lower leg: No edema.      Left lower leg: No edema.   Skin:     General: Skin is warm and dry.   Neurological:      General: No focal deficit present.      Mental Status: She is alert and oriented to person, place, and time.   Psychiatric:         Mood and Affect: Mood normal.         Behavior: Behavior normal.         Thought Content: Thought content normal.         Procedures       Diagnoses and all orders for this visit:    1. Liver function abnormality (Primary)  Comments:  Will recheck labs today and contact with results.    2. Weight loss  Comments:  Will check labs. Could be related to depression but she doesn't want to take additional medication/alternate medication at this time.  She will monitor weight closely and let me know if she continues to lose.  Discussed that if she does continue to lose, will need to do further workup with more blood work, chest xray.  She also needs to get mammogram and colonoscopy done and she is aware of this.  Orders:  -     TSH; Future    3. Major depressive disorder, recurrent, mild (HCC)  Comments:  Pt wants to continue current regimen at this time but will let me know if anything changes.        I spent 30 minutes of patient care including reviewing pt's previous hx, reviewing current symptoms, performing exam, ordering tests, discussing treatment plan and completing my note.

## 2022-11-05 LAB — TSH SERPL DL<=0.05 MIU/L-ACNC: <0.005 UIU/ML (ref 0.27–4.2)

## 2022-11-06 LAB
T3FREE SERPL-MCNC: 10.2 PG/ML (ref 2–4.4)
T4 FREE SERPL-MCNC: 3.01 NG/DL (ref 0.93–1.7)

## 2022-11-07 DIAGNOSIS — E05.90 HYPERTHYROIDISM: Primary | ICD-10-CM

## 2022-11-14 ENCOUNTER — HOSPITAL ENCOUNTER (OUTPATIENT)
Dept: NUCLEAR MEDICINE | Facility: HOSPITAL | Age: 62
Discharge: HOME | End: 2022-11-14

## 2022-11-14 DIAGNOSIS — E05.90 HYPERTHYROIDISM: ICD-10-CM

## 2022-11-14 PROCEDURE — A9516 IODINE I-123 SOD IODIDE MIC: HCPCS | Performed by: PHYSICIAN ASSISTANT

## 2022-11-14 PROCEDURE — 78014 THYROID IMAGING W/BLOOD FLOW: CPT

## 2022-11-14 PROCEDURE — 0 SODIUM IODIDE 3.7 MBQ CAPSULE: Performed by: PHYSICIAN ASSISTANT

## 2022-11-14 RX ORDER — SODIUM IODIDE I 123 100 UCI/1
1 CAPSULE, GELATIN COATED ORAL
Status: COMPLETED | OUTPATIENT
Start: 2022-11-14 | End: 2022-11-14

## 2022-11-14 RX ADMIN — SODIUM IODIDE I 123 1 CAPSULE: 100 CAPSULE, GELATIN COATED ORAL at 12:15

## 2022-11-15 ENCOUNTER — HOSPITAL ENCOUNTER (OUTPATIENT)
Dept: NUCLEAR MEDICINE | Facility: HOSPITAL | Age: 62
Discharge: HOME | End: 2022-11-15

## 2022-11-15 ENCOUNTER — OFFICE VISIT (OUTPATIENT)
Dept: ENDOCRINOLOGY | Facility: CLINIC | Age: 62
End: 2022-11-15

## 2022-11-15 DIAGNOSIS — Z51.81 ENCOUNTER FOR MEDICATION MONITORING: ICD-10-CM

## 2022-11-15 DIAGNOSIS — E05.90 HYPERTHYROIDISM: Primary | ICD-10-CM

## 2022-11-15 PROCEDURE — 99203 OFFICE O/P NEW LOW 30 MIN: CPT | Performed by: INTERNAL MEDICINE

## 2022-11-15 RX ORDER — METHIMAZOLE 5 MG/1
TABLET ORAL
Qty: 60 TABLET | Refills: 1 | Status: SHIPPED | OUTPATIENT
Start: 2022-11-15 | End: 2022-12-07

## 2022-11-15 RX ORDER — PROPRANOLOL HYDROCHLORIDE 10 MG/1
TABLET ORAL
Qty: 180 TABLET | Refills: 1 | Status: SHIPPED | OUTPATIENT
Start: 2022-11-15 | End: 2022-11-15

## 2022-11-15 RX ORDER — PROPRANOLOL HYDROCHLORIDE 10 MG/1
TABLET ORAL
Qty: 180 TABLET | Refills: 1 | Status: SHIPPED | OUTPATIENT
Start: 2022-11-15

## 2022-11-15 RX ORDER — METHIMAZOLE 5 MG/1
TABLET ORAL
Qty: 60 TABLET | Refills: 1 | Status: SHIPPED | OUTPATIENT
Start: 2022-11-15 | End: 2022-11-15

## 2022-11-17 VITALS
WEIGHT: 101 LBS | HEIGHT: 64 IN | BODY MASS INDEX: 17.24 KG/M2 | DIASTOLIC BLOOD PRESSURE: 60 MMHG | HEART RATE: 102 BPM | SYSTOLIC BLOOD PRESSURE: 120 MMHG

## 2022-11-18 ENCOUNTER — TELEPHONE (OUTPATIENT)
Dept: ENDOCRINOLOGY | Facility: CLINIC | Age: 62
End: 2022-11-18

## 2022-11-18 ENCOUNTER — PATIENT ROUNDING (BHMG ONLY) (OUTPATIENT)
Dept: ENDOCRINOLOGY | Facility: CLINIC | Age: 62
End: 2022-11-18

## 2022-11-18 NOTE — PATIENT INSTRUCTIONS
Start propranolol 10 mg one in am & supper.  May double dose if needed for palpitations / insomnia.  Start methimazole 5 mg one tab in am & supper.  Have labs rechecked in 2 weeks.  Will call you with the lab results.  Ok to continue regular activities. Do not do strenuous exercise.  F/u in 6 months, with labs prior.

## 2022-11-18 NOTE — PROGRESS NOTES
[facility]    Referring Provider: TIFFANY Garsia  Reason for Consultation: Thyroid evaluation & management.    Patient Care Team:  Mamie Martinez PA as PCP - General (Physician Assistant)  Maria De Jesus Justin MD as Consulting Physician (Family Medicine)    Chief complaint Hyperthyroidism (New Patient)      Subjective .     History of present illness:    This is a  62 y.o. female with hyperthyroidism diagnosed 1 week ago.  Presented with 6 months history of 25 lb wt loss & palpitations x 3 weeks.  Also has irritability & fatigue.  Labs showed suppressed TSH. Thyroid scan was done today.  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  Quit smoking 10y ago.  Taking vit D 50,000 units weekly.  Son had Juvenile RA.    Review of Systems  Review of Systems   Constitutional: Positive for fatigue and unexpected weight loss.   HENT: Negative for trouble swallowing.    Eyes: Negative for blurred vision.   Respiratory: Positive for shortness of breath.    Cardiovascular: Positive for palpitations.   Gastrointestinal: Negative for constipation, diarrhea and nausea.   Endocrine: Negative for polyuria.   Neurological: Positive for weakness and headache. Negative for tremors.   Psychiatric/Behavioral:        Irritability       History  Past Medical History:   Diagnosis Date   • Hyperlipidemia    • Hyperthyroidism 2022     Past Surgical History:   Procedure Laterality Date   • APPENDECTOMY     • BREAST BIOPSY Left 2020    stereo   •  SECTION  , ,    • EXCISION BAKERS CYST KNEE Left    • POSTPARTUM TUBAL LIGATION     • TONSILLECTOMY       Family History   Problem Relation Age of Onset   • Colon cancer Father    • Colon cancer Maternal Grandmother    • Colon cancer Maternal Grandfather    • Colon cancer Paternal Grandfather      Social History     Tobacco Use   • Smoking status: Former     Types: Cigarettes     Quit date: 2011     Years since quittin.4   • Smokeless  tobacco: Never   Vaping Use   • Vaping Use: Never used   Substance Use Topics   • Alcohol use: Not Currently   • Drug use: Never         Allergies:  Sulfa antibiotics    Objective     Vital Signs       Vitals:    11/15/22 1350   BP: 120/60   Pulse: 102         Physical Exam:     General Appearance:    Alert, cooperative, in no acute distress. Thin   Head:    Normocephalic, without obvious abnormality, atraumatic   Eyes:            No lid lag, conjunctivae and sclerae normal, no   icterus, no pallor, corneas clear, PERRLA   Throat:   No oral lesions,  oral mucosa moist   Neck:   31 cm in circumference, thyroid soft, no carotid bruit   Lungs:     Clear    Heart:    Regular rhythm and normal rate   Chest Wall:    No abnormalities observed   Abdomen:     Normal bowel sounds, soft                 Extremities:   Fine hand tremors, no edema               Pulses:   Pulses palpable and equal bilaterally   Skin:   No bruising or rash   Neurologic:  DTR 1+       Results Review  I have reviewed the patient's new clinical results, labs & imaging.    Lab Results (last 24 hours)     ** No results found for the last 24 hours. **        Lab Results   Component Value Date    TSH <0.005 (L) 11/04/2022     Free T4 3.01, Free T3 10.2    Thyroid scan on 11/14/2022: 77.8% uptake, homogeneous, consistent with Graves' Disease.    Assessment & Plan     1. Hyperthyroidism    Start propranolol 10 mg one in am & supper.  May double dose if needed for palpitations / insomnia.  Start methimazole 5 mg one tab in am & supper.  Have labs rechecked in 2 weeks.  Will call you with the lab results.  Ok to continue regular activities. Do not do strenuous exercise.    I discussed the patients findings and my recommendations with patient    Oscar Bhandari MD  11/17/22  23:27 EST

## 2022-11-18 NOTE — TELEPHONE ENCOUNTER
Patient states she is on a Beta Blocker and she says her heart rate is still elevated. It is running around 100, does get to 80 at rest for a short period of time. She did take two extra pills a couple days but it did not seem to be helping. She says she is exhausted and not sure what else to do.     Also she says the 30 day supply of the Beta Blocker will not last 30 days because she had to take extra and insurance will not pay for it sooner unless prescription is changed.

## 2022-11-18 NOTE — TELEPHONE ENCOUNTER
I called & talked to pt. My Rx for propranolol was for 90d to accommodate extra dosages as discussed.  The pharm gave her only 30 d supply.  I called the phar to find out why:  The computer automatically switched to 30d because it is free, but 90d would cost $40!. So, it makes sense.  I told her to call when ready for a refill. Let us know how many she is taking per day & I will increase the dosage directions to match her use.

## 2022-11-18 NOTE — PROGRESS NOTES
November 18, 2022    Hello, may I speak with Justa Coles?    My name is Mi    I am  with [facility]  [address] [place] [address].    Before we get started may I verify your date of birth? 1960    I am calling to officially welcome you to our practice and ask about your recent visit. Is this a good time to talk? Yes    Tell me about your visit with us. What things went well?  It all went well. Dr Bhandari was wonderful, her medical assistant was wonderful.     We're always looking for ways to make our patients' experiences even better. Do you have recommendations on ways we may improve?  The wait was long to get in the room and then for the doctor to come into the room.    Overall were you satisfied with your first visit to our practice? Yes       I appreciate you taking the time to speak with me today. Is there anything else I can do for you? Had a question about her medication and heart rate. Phone note sent to Dr Bhandari.      Thank you, and have a great day.

## 2022-11-21 DIAGNOSIS — E78.2 MIXED HYPERLIPIDEMIA: ICD-10-CM

## 2022-11-23 RX ORDER — ATORVASTATIN CALCIUM 10 MG/1
10 TABLET, FILM COATED ORAL NIGHTLY
Qty: 90 TABLET | Refills: 1 | Status: SHIPPED | OUTPATIENT
Start: 2022-11-23

## 2022-11-26 ENCOUNTER — LAB (OUTPATIENT)
Dept: LAB | Facility: HOSPITAL | Age: 62
End: 2022-11-26

## 2022-11-26 DIAGNOSIS — E05.90 HYPERTHYROIDISM: ICD-10-CM

## 2022-11-26 DIAGNOSIS — Z51.81 ENCOUNTER FOR MEDICATION MONITORING: ICD-10-CM

## 2022-11-26 DIAGNOSIS — E05.90 HYPERTHYROIDISM: Primary | ICD-10-CM

## 2022-11-26 LAB
ALBUMIN SERPL-MCNC: 3.9 G/DL (ref 3.5–5.2)
ALBUMIN/GLOB SERPL: 1.3 G/DL
ALP SERPL-CCNC: 116 U/L (ref 39–117)
ALT SERPL W P-5'-P-CCNC: 20 U/L (ref 1–33)
ANION GAP SERPL CALCULATED.3IONS-SCNC: 9.2 MMOL/L (ref 5–15)
AST SERPL-CCNC: 17 U/L (ref 1–32)
BASOPHILS # BLD AUTO: 0.1 10*3/MM3 (ref 0–0.2)
BASOPHILS NFR BLD AUTO: 1 % (ref 0–1.5)
BILIRUB SERPL-MCNC: <0.2 MG/DL (ref 0–1.2)
BUN SERPL-MCNC: 20 MG/DL (ref 8–23)
BUN/CREAT SERPL: 31.7 (ref 7–25)
CALCIUM SPEC-SCNC: 9.6 MG/DL (ref 8.6–10.5)
CHLORIDE SERPL-SCNC: 101 MMOL/L (ref 98–107)
CO2 SERPL-SCNC: 27.8 MMOL/L (ref 22–29)
CREAT SERPL-MCNC: 0.63 MG/DL (ref 0.57–1)
DEPRECATED RDW RBC AUTO: 38.7 FL (ref 37–54)
EGFRCR SERPLBLD CKD-EPI 2021: 99.8 ML/MIN/1.73
EOSINOPHIL # BLD AUTO: 1.02 10*3/MM3 (ref 0–0.4)
EOSINOPHIL NFR BLD AUTO: 9.9 % (ref 0.3–6.2)
ERYTHROCYTE [DISTWIDTH] IN BLOOD BY AUTOMATED COUNT: 12.9 % (ref 12.3–15.4)
GLOBULIN UR ELPH-MCNC: 2.9 GM/DL
GLUCOSE SERPL-MCNC: 76 MG/DL (ref 65–99)
HCT VFR BLD AUTO: 40 % (ref 34–46.6)
HGB BLD-MCNC: 13.4 G/DL (ref 12–15.9)
IMM GRANULOCYTES # BLD AUTO: 0.03 10*3/MM3 (ref 0–0.05)
IMM GRANULOCYTES NFR BLD AUTO: 0.3 % (ref 0–0.5)
LYMPHOCYTES # BLD AUTO: 3.07 10*3/MM3 (ref 0.7–3.1)
LYMPHOCYTES NFR BLD AUTO: 29.8 % (ref 19.6–45.3)
MCH RBC QN AUTO: 27.7 PG (ref 26.6–33)
MCHC RBC AUTO-ENTMCNC: 33.5 G/DL (ref 31.5–35.7)
MCV RBC AUTO: 82.6 FL (ref 79–97)
MONOCYTES # BLD AUTO: 1.09 10*3/MM3 (ref 0.1–0.9)
MONOCYTES NFR BLD AUTO: 10.6 % (ref 5–12)
NEUTROPHILS NFR BLD AUTO: 4.98 10*3/MM3 (ref 1.7–7)
NEUTROPHILS NFR BLD AUTO: 48.4 % (ref 42.7–76)
NRBC BLD AUTO-RTO: 0 /100 WBC (ref 0–0.2)
PLATELET # BLD AUTO: 397 10*3/MM3 (ref 140–450)
PMV BLD AUTO: 10.5 FL (ref 6–12)
POTASSIUM SERPL-SCNC: 4.4 MMOL/L (ref 3.5–5.2)
PROT SERPL-MCNC: 6.8 G/DL (ref 6–8.5)
RBC # BLD AUTO: 4.84 10*6/MM3 (ref 3.77–5.28)
SODIUM SERPL-SCNC: 138 MMOL/L (ref 136–145)
T4 FREE SERPL-MCNC: 1.62 NG/DL (ref 0.93–1.7)
TSH SERPL DL<=0.05 MIU/L-ACNC: <0.005 UIU/ML (ref 0.27–4.2)
WBC NRBC COR # BLD: 10.29 10*3/MM3 (ref 3.4–10.8)

## 2022-11-26 PROCEDURE — 80050 GENERAL HEALTH PANEL: CPT

## 2022-11-26 PROCEDURE — 84439 ASSAY OF FREE THYROXINE: CPT

## 2022-11-26 PROCEDURE — 36415 COLL VENOUS BLD VENIPUNCTURE: CPT

## 2022-12-07 ENCOUNTER — OFFICE VISIT (OUTPATIENT)
Dept: FAMILY MEDICINE CLINIC | Facility: CLINIC | Age: 62
End: 2022-12-07

## 2022-12-07 VITALS
TEMPERATURE: 98.7 F | BODY MASS INDEX: 15.62 KG/M2 | OXYGEN SATURATION: 91 % | WEIGHT: 91 LBS | SYSTOLIC BLOOD PRESSURE: 140 MMHG | HEART RATE: 114 BPM | DIASTOLIC BLOOD PRESSURE: 80 MMHG

## 2022-12-07 DIAGNOSIS — J44.1 COPD WITH EXACERBATION: Primary | ICD-10-CM

## 2022-12-07 PROCEDURE — 99214 OFFICE O/P EST MOD 30 MIN: CPT | Performed by: NURSE PRACTITIONER

## 2022-12-07 RX ORDER — AMOXICILLIN AND CLAVULANATE POTASSIUM 875; 125 MG/1; MG/1
1 TABLET, FILM COATED ORAL 2 TIMES DAILY
Qty: 20 TABLET | Refills: 0 | Status: SHIPPED | OUTPATIENT
Start: 2022-12-07 | End: 2022-12-17

## 2022-12-07 RX ORDER — METHIMAZOLE 10 MG/1
TABLET ORAL 2 TIMES DAILY
COMMUNITY
Start: 2022-11-25 | End: 2023-01-23

## 2022-12-07 RX ORDER — GUAIFENESIN 600 MG/1
1200 TABLET, EXTENDED RELEASE ORAL 2 TIMES DAILY PRN
Qty: 30 TABLET | Refills: 0 | Status: SHIPPED | OUTPATIENT
Start: 2022-12-07

## 2022-12-07 RX ORDER — PREDNISONE 20 MG/1
40 TABLET ORAL DAILY
Qty: 10 TABLET | Refills: 0 | Status: SHIPPED | OUTPATIENT
Start: 2022-12-07 | End: 2022-12-12

## 2022-12-07 NOTE — PROGRESS NOTES
Subjective     Justa Coles is a 62 y.o. female.     History of Present Illness  Pt is here today with c/o cough, congestion, SOA.  She states that her symptoms started on Saturday.  She is unaware of any known ill contacts.  Denies fever.  She does have chills.  She states that her cough is mostly productive.  She is having sinus congestion.  She has little energy, difficulty eating.  Has a history of COPD and uses levalbuterol and Breo regularly.  She hasnt smoked in years  Was recently diagnosed with Graves and is on treatment.        The following portions of the patient's history were reviewed and updated as appropriate: allergies, current medications, past family history, past medical history, past social history, past surgical history and problem list.    Review of Systems   Constitutional: Positive for chills and fatigue. Negative for fever.   HENT: Positive for congestion and sinus pressure. Negative for ear pain and sore throat.    Respiratory: Positive for cough, chest tightness, shortness of breath and wheezing.    Cardiovascular: Negative for chest pain and palpitations.   Gastrointestinal: Positive for nausea. Negative for abdominal pain and vomiting.   Musculoskeletal: Negative for myalgias.   Neurological: Negative for dizziness and headache.       Objective     /80 (BP Location: Left arm, Patient Position: Sitting, Cuff Size: Adult)   Pulse 114   Temp 98.7 °F (37.1 °C) (Tympanic)   Wt 41.3 kg (91 lb)   SpO2 91%   BMI 15.62 kg/m²     Current Outpatient Medications on File Prior to Visit   Medication Sig Dispense Refill   • methIMAzole (TAPAZOLE) 10 MG tablet 2 (Two) Times a Day.     • albuterol sulfate  (90 Base) MCG/ACT inhaler Inhale 2 puffs Every 6 (Six) Hours As Needed for Wheezing or Shortness of Air. 18 g 5   • atorvastatin (LIPITOR) 10 MG tablet Take 1 tablet by mouth Every Night. 90 tablet 1   • Breo Ellipta 200-25 MCG/INH inhaler Inhale 1 puff Daily. 60 each 5   •  DULoxetine (CYMBALTA) 60 MG capsule Take 1 capsule by mouth Daily. 90 capsule 1   • levalbuterol (XOPENEX HFA) 45 MCG/ACT inhaler Inhale 1-2 puffs Every 6 (Six) Hours As Needed for Wheezing or Shortness of Air. 15 g 11   • propranolol (INDERAL) 10 MG tablet Take one bid 180 tablet 1   • [DISCONTINUED] methIMAzole (TAPAZOLE) 5 MG tablet Take one tab 2 x / d 60 tablet 1     No current facility-administered medications on file prior to visit.        Physical Exam  Constitutional:       General: She is not in acute distress.     Appearance: Normal appearance. She is not ill-appearing.   HENT:      Head: Normocephalic and atraumatic.      Nose: No congestion or rhinorrhea.   Cardiovascular:      Rate and Rhythm: Regular rhythm. Tachycardia present.      Heart sounds: No murmur heard.  Pulmonary:      Effort: Pulmonary effort is normal.      Breath sounds: Decreased breath sounds and wheezing present.   Musculoskeletal:         General: Normal range of motion.   Skin:     General: Skin is warm and dry.   Neurological:      General: No focal deficit present.      Mental Status: She is alert and oriented to person, place, and time.   Psychiatric:         Mood and Affect: Mood normal.         Behavior: Behavior normal.         Thought Content: Thought content normal.         Judgment: Judgment normal.           Assessment & Plan     Diagnoses and all orders for this visit:    1. COPD with exacerbation (HCC) (Primary)  Comments:  decreased air flow  oxygen on low end of normal  add in steroid for inflammation- watch HR  zpack to cover poss PNA  strict ER prec  message update in a few day  Orders:  -     amoxicillin-clavulanate (Augmentin) 875-125 MG per tablet; Take 1 tablet by mouth 2 (Two) Times a Day for 10 days.  Dispense: 20 tablet; Refill: 0  -     predniSONE (DELTASONE) 20 MG tablet; Take 2 tablets by mouth Daily for 5 days.  Dispense: 10 tablet; Refill: 0  -     guaiFENesin (Mucinex) 600 MG 12 hr tablet; Take 2  tablets by mouth 2 (Two) Times a Day As Needed for Cough or Congestion.  Dispense: 30 tablet; Refill: 0

## 2022-12-12 ENCOUNTER — TELEPHONE (OUTPATIENT)
Dept: FAMILY MEDICINE CLINIC | Facility: CLINIC | Age: 62
End: 2022-12-12

## 2022-12-12 DIAGNOSIS — R07.89 CHEST TIGHTNESS: Primary | ICD-10-CM

## 2022-12-12 NOTE — TELEPHONE ENCOUNTER
Patient works later and she don't know when she will get around to the chest xray. She said she is better but still just not 100% and feels she could use a little more help with medications.

## 2022-12-12 NOTE — TELEPHONE ENCOUNTER
If she is not feeling better with the antibiotic this could be viral. She has already been on a high dose steroid. She needs to complete the abx

## 2022-12-12 NOTE — TELEPHONE ENCOUNTER
She has been on steroid, antibiotic, and levalbuterol inhaler. If she is really short of air she needs to go to the ER. I will put in a CXR so we can see what we are dealing with exactly.

## 2022-12-12 NOTE — TELEPHONE ENCOUNTER
Caller: Justa Coles    Relationship: Self    Best call back number: [phone]    What was the call regarding: FOLLOW-UP FROM LAST WEEK.  NOT BREATHING WELL.  OUT OF STEROID OR CONSIDER NEBULIZER.  CHEST IS STILL TIGHT.    Do you require a callback: YES           Complex Repair And Melolabial Flap Text: The defect edges were debeveled with a #15 scalpel blade.  The primary defect was closed partially with a complex linear closure.  Given the location of the remaining defect, shape of the defect and the proximity to free margins a melolabial flap was deemed most appropriate for complete closure of the defect.  Using a sterile surgical marker, an appropriate advancement flap was drawn incorporating the defect and placing the expected incisions within the relaxed skin tension lines where possible.    The area thus outlined was incised deep to adipose tissue with a #15 scalpel blade.  The skin margins were undermined to an appropriate distance in all directions utilizing iris scissors.

## 2022-12-23 DIAGNOSIS — F41.8 MIXED ANXIETY DEPRESSIVE DISORDER: ICD-10-CM

## 2022-12-23 RX ORDER — DULOXETIN HYDROCHLORIDE 60 MG/1
60 CAPSULE, DELAYED RELEASE ORAL DAILY
Qty: 90 CAPSULE | Refills: 1 | Status: SHIPPED | OUTPATIENT
Start: 2022-12-23

## 2022-12-31 DIAGNOSIS — J44.9 CHRONIC OBSTRUCTIVE PULMONARY DISEASE, UNSPECIFIED COPD TYPE: ICD-10-CM

## 2023-01-21 DIAGNOSIS — E05.90 HYPERTHYROIDISM: Primary | ICD-10-CM

## 2023-01-26 NOTE — TELEPHONE ENCOUNTER
I spoke with patient today and she plans to go Saturday to get labs drawn at [facility]. 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 She states she will run out either Sunday or Monday.    Dr Bhandari: I am sending this back to you in case those labs come in over the weekend or on Monday.

## 2023-01-28 ENCOUNTER — LAB (OUTPATIENT)
Dept: LAB | Facility: HOSPITAL | Age: 63
End: 2023-01-28
Payer: COMMERCIAL

## 2023-01-28 DIAGNOSIS — E05.90 HYPERTHYROIDISM: ICD-10-CM

## 2023-01-28 DIAGNOSIS — E05.90 HYPERTHYROIDISM: Primary | ICD-10-CM

## 2023-01-28 DIAGNOSIS — Z51.81 ENCOUNTER FOR MEDICATION MONITORING: ICD-10-CM

## 2023-01-28 LAB
ALBUMIN SERPL-MCNC: 4.3 G/DL (ref 3.5–5.2)
ALBUMIN/GLOB SERPL: 1.9 G/DL
ALP SERPL-CCNC: 134 U/L (ref 39–117)
ALT SERPL W P-5'-P-CCNC: 11 U/L (ref 1–33)
ANION GAP SERPL CALCULATED.3IONS-SCNC: 9 MMOL/L (ref 5–15)
AST SERPL-CCNC: 17 U/L (ref 1–32)
BASOPHILS # BLD AUTO: 0.11 10*3/MM3 (ref 0–0.2)
BASOPHILS NFR BLD AUTO: 1.4 % (ref 0–1.5)
BILIRUB SERPL-MCNC: 0.3 MG/DL (ref 0–1.2)
BUN SERPL-MCNC: 18 MG/DL (ref 8–23)
BUN/CREAT SERPL: 26.9 (ref 7–25)
CALCIUM SPEC-SCNC: 9.5 MG/DL (ref 8.6–10.5)
CHLORIDE SERPL-SCNC: 103 MMOL/L (ref 98–107)
CO2 SERPL-SCNC: 29 MMOL/L (ref 22–29)
CREAT SERPL-MCNC: 0.67 MG/DL (ref 0.57–1)
DEPRECATED RDW RBC AUTO: 50.7 FL (ref 37–54)
EGFRCR SERPLBLD CKD-EPI 2021: 97.7 ML/MIN/1.73
EOSINOPHIL # BLD AUTO: 0.38 10*3/MM3 (ref 0–0.4)
EOSINOPHIL NFR BLD AUTO: 4.7 % (ref 0.3–6.2)
ERYTHROCYTE [DISTWIDTH] IN BLOOD BY AUTOMATED COUNT: 16.6 % (ref 12.3–15.4)
GLOBULIN UR ELPH-MCNC: 2.3 GM/DL
GLUCOSE SERPL-MCNC: 102 MG/DL (ref 65–99)
HCT VFR BLD AUTO: 43.6 % (ref 34–46.6)
HGB BLD-MCNC: 14.6 G/DL (ref 12–15.9)
IMM GRANULOCYTES # BLD AUTO: 0.01 10*3/MM3 (ref 0–0.05)
IMM GRANULOCYTES NFR BLD AUTO: 0.1 % (ref 0–0.5)
LYMPHOCYTES # BLD AUTO: 2.07 10*3/MM3 (ref 0.7–3.1)
LYMPHOCYTES NFR BLD AUTO: 25.8 % (ref 19.6–45.3)
MCH RBC QN AUTO: 28.5 PG (ref 26.6–33)
MCHC RBC AUTO-ENTMCNC: 33.5 G/DL (ref 31.5–35.7)
MCV RBC AUTO: 85 FL (ref 79–97)
MONOCYTES # BLD AUTO: 0.78 10*3/MM3 (ref 0.1–0.9)
MONOCYTES NFR BLD AUTO: 9.7 % (ref 5–12)
NEUTROPHILS NFR BLD AUTO: 4.67 10*3/MM3 (ref 1.7–7)
NEUTROPHILS NFR BLD AUTO: 58.3 % (ref 42.7–76)
NRBC BLD AUTO-RTO: 0 /100 WBC (ref 0–0.2)
PLATELET # BLD AUTO: 486 10*3/MM3 (ref 140–450)
PMV BLD AUTO: 10.3 FL (ref 6–12)
POTASSIUM SERPL-SCNC: 4.3 MMOL/L (ref 3.5–5.2)
PROT SERPL-MCNC: 6.6 G/DL (ref 6–8.5)
RBC # BLD AUTO: 5.13 10*6/MM3 (ref 3.77–5.28)
SODIUM SERPL-SCNC: 141 MMOL/L (ref 136–145)
T4 FREE SERPL-MCNC: 0.59 NG/DL (ref 0.93–1.7)
TSH SERPL DL<=0.05 MIU/L-ACNC: 7.45 UIU/ML (ref 0.27–4.2)
WBC NRBC COR # BLD: 8.02 10*3/MM3 (ref 3.4–10.8)

## 2023-01-28 PROCEDURE — 36415 COLL VENOUS BLD VENIPUNCTURE: CPT

## 2023-01-28 PROCEDURE — 84439 ASSAY OF FREE THYROXINE: CPT

## 2023-01-28 PROCEDURE — 80050 GENERAL HEALTH PANEL: CPT

## 2023-01-28 RX ORDER — METHIMAZOLE 5 MG/1
TABLET ORAL
Qty: 90 TABLET | Refills: 0 | Status: SHIPPED | OUTPATIENT
Start: 2023-01-28 | End: 2023-02-20

## 2023-01-29 NOTE — TELEPHONE ENCOUNTER
I called & talked to pt:  Thyroid over blocked.  Stop methimazole x 1 week. Then restart just one tab daily.  Rx sent for one a day.  Have labs rechecked in 3 weeks. Orders placed. [facility]  Will call you with the lab results.

## 2023-02-18 ENCOUNTER — LAB (OUTPATIENT)
Dept: LAB | Facility: HOSPITAL | Age: 63
End: 2023-02-18
Payer: COMMERCIAL

## 2023-02-18 DIAGNOSIS — E05.90 HYPERTHYROIDISM: ICD-10-CM

## 2023-02-18 LAB
T4 FREE SERPL-MCNC: 0.89 NG/DL (ref 0.93–1.7)
TSH SERPL DL<=0.05 MIU/L-ACNC: 8.33 UIU/ML (ref 0.27–4.2)

## 2023-02-18 PROCEDURE — 36415 COLL VENOUS BLD VENIPUNCTURE: CPT

## 2023-02-18 PROCEDURE — 84443 ASSAY THYROID STIM HORMONE: CPT

## 2023-02-18 PROCEDURE — 84439 ASSAY OF FREE THYROXINE: CPT

## 2023-02-20 DIAGNOSIS — E05.90 HYPERTHYROIDISM: Primary | ICD-10-CM

## 2023-03-18 ENCOUNTER — LAB (OUTPATIENT)
Dept: LAB | Facility: HOSPITAL | Age: 63
End: 2023-03-18
Payer: COMMERCIAL

## 2023-03-18 DIAGNOSIS — E05.90 HYPERTHYROIDISM: ICD-10-CM

## 2023-03-18 LAB
T4 FREE SERPL-MCNC: 1.14 NG/DL (ref 0.93–1.7)
TSH SERPL DL<=0.05 MIU/L-ACNC: 1.54 UIU/ML (ref 0.27–4.2)

## 2023-03-18 PROCEDURE — 84439 ASSAY OF FREE THYROXINE: CPT

## 2023-03-18 PROCEDURE — 36415 COLL VENOUS BLD VENIPUNCTURE: CPT

## 2023-03-18 PROCEDURE — 84443 ASSAY THYROID STIM HORMONE: CPT

## 2023-03-22 ENCOUNTER — TELEPHONE (OUTPATIENT)
Dept: ENDOCRINOLOGY | Facility: CLINIC | Age: 63
End: 2023-03-22
Payer: COMMERCIAL

## 2023-03-23 DIAGNOSIS — E05.90 HYPERTHYROIDISM: Primary | ICD-10-CM

## 2023-03-23 NOTE — TELEPHONE ENCOUNTER
Called & left msg for pt:  Thyroid levels are normal. Stay off methimazole.   Have las rechecked prior to June appt.

## 2023-05-02 DIAGNOSIS — E05.90 HYPERTHYROIDISM: ICD-10-CM

## 2023-05-02 RX ORDER — METHIMAZOLE 5 MG/1
TABLET ORAL
Qty: 90 TABLET | Refills: 0 | OUTPATIENT
Start: 2023-05-02

## 2023-05-25 DIAGNOSIS — J44.9 CHRONIC OBSTRUCTIVE PULMONARY DISEASE, UNSPECIFIED COPD TYPE: ICD-10-CM

## 2023-05-25 RX ORDER — FLUTICASONE FUROATE AND VILANTEROL TRIFENATATE 200; 25 UG/1; UG/1
POWDER RESPIRATORY (INHALATION)
Qty: 60 EACH | Refills: 4 | Status: SHIPPED | OUTPATIENT
Start: 2023-05-25

## 2023-06-15 RX ORDER — BUDESONIDE 1 MG/2ML
INHALANT ORAL
COMMUNITY
Start: 2023-03-08

## 2023-06-17 ENCOUNTER — LAB (OUTPATIENT)
Dept: LAB | Facility: HOSPITAL | Age: 63
End: 2023-06-17
Payer: COMMERCIAL

## 2023-06-17 DIAGNOSIS — E05.90 HYPERTHYROIDISM: ICD-10-CM

## 2023-06-17 LAB
T4 FREE SERPL-MCNC: 1.43 NG/DL (ref 0.93–1.7)
TSH SERPL DL<=0.05 MIU/L-ACNC: 0.16 UIU/ML (ref 0.27–4.2)

## 2023-06-17 PROCEDURE — 84439 ASSAY OF FREE THYROXINE: CPT

## 2023-06-17 PROCEDURE — 36415 COLL VENOUS BLD VENIPUNCTURE: CPT

## 2023-06-17 PROCEDURE — 84443 ASSAY THYROID STIM HORMONE: CPT

## 2023-07-24 ENCOUNTER — TELEPHONE (OUTPATIENT)
Dept: FAMILY MEDICINE CLINIC | Facility: CLINIC | Age: 63
End: 2023-07-24
Payer: COMMERCIAL

## 2023-07-24 NOTE — TELEPHONE ENCOUNTER
Fax from pharmacy requesting PA for Breo Ellipta 200-25MCG/ACT aerosol powder. Attempted PA in covermymeds. Received the following response:  Your PA has been resolved, no additional PA is required. For further inquiries please contact the number on the back of the member prescription card. (Message [phone]).   Information has been faxed to the pharmacy.     Key: [id number]

## 2023-08-16 ENCOUNTER — HOSPITAL ENCOUNTER (EMERGENCY)
Facility: HOSPITAL | Age: 63
Discharge: HOME | End: 2023-08-16
Attending: EMERGENCY MEDICINE
Payer: COMMERCIAL

## 2023-08-16 ENCOUNTER — APPOINTMENT (OUTPATIENT)
Dept: GENERAL RADIOLOGY | Facility: HOSPITAL | Age: 63
End: 2023-08-16
Payer: COMMERCIAL

## 2023-08-16 VITALS
OXYGEN SATURATION: 91 % | DIASTOLIC BLOOD PRESSURE: 73 MMHG | HEIGHT: 65 IN | SYSTOLIC BLOOD PRESSURE: 167 MMHG | TEMPERATURE: 98.1 F | RESPIRATION RATE: 22 BRPM | WEIGHT: 85.98 LBS | HEART RATE: 77 BPM | BODY MASS INDEX: 14.33 KG/M2

## 2023-08-16 DIAGNOSIS — J44.1 COPD EXACERBATION: ICD-10-CM

## 2023-08-16 DIAGNOSIS — R06.02 SHORTNESS OF BREATH: Primary | ICD-10-CM

## 2023-08-16 LAB
ALBUMIN SERPL-MCNC: 4.4 G/DL (ref 3.5–5.2)
ALBUMIN/GLOB SERPL: 1.5 G/DL
ALP SERPL-CCNC: 107 U/L (ref 39–117)
ALT SERPL W P-5'-P-CCNC: 11 U/L (ref 1–33)
ANION GAP SERPL CALCULATED.3IONS-SCNC: 10 MMOL/L (ref 5–15)
AST SERPL-CCNC: 14 U/L (ref 1–32)
B PARAPERT DNA SPEC QL NAA+PROBE: NOT DETECTED
B PERT DNA SPEC QL NAA+PROBE: NOT DETECTED
BASOPHILS # BLD AUTO: 0.1 10*3/MM3 (ref 0–0.2)
BASOPHILS NFR BLD AUTO: 0.7 % (ref 0–1.5)
BILIRUB SERPL-MCNC: 0.5 MG/DL (ref 0–1.2)
BUN SERPL-MCNC: 31 MG/DL (ref 8–23)
BUN/CREAT SERPL: 59.6 (ref 7–25)
C PNEUM DNA NPH QL NAA+NON-PROBE: NOT DETECTED
CALCIUM SPEC-SCNC: 9.6 MG/DL (ref 8.6–10.5)
CHLORIDE SERPL-SCNC: 101 MMOL/L (ref 98–107)
CO2 SERPL-SCNC: 30 MMOL/L (ref 22–29)
CREAT SERPL-MCNC: 0.52 MG/DL (ref 0.57–1)
D DIMER PPP FEU-MCNC: 0.31 MG/L (FEU) (ref 0–0.64)
D-LACTATE SERPL-SCNC: 1.5 MMOL/L (ref 0.3–2)
DEPRECATED RDW RBC AUTO: 46.8 FL (ref 37–54)
EGFRCR SERPLBLD CKD-EPI 2021: 103.9 ML/MIN/1.73
EOSINOPHIL # BLD AUTO: 0.1 10*3/MM3 (ref 0–0.4)
EOSINOPHIL NFR BLD AUTO: 1 % (ref 0.3–6.2)
ERYTHROCYTE [DISTWIDTH] IN BLOOD BY AUTOMATED COUNT: 14.8 % (ref 12.3–15.4)
FLUAV SUBTYP SPEC NAA+PROBE: NOT DETECTED
FLUBV RNA ISLT QL NAA+PROBE: NOT DETECTED
GLOBULIN UR ELPH-MCNC: 2.9 GM/DL
GLUCOSE SERPL-MCNC: 107 MG/DL (ref 65–99)
HADV DNA SPEC NAA+PROBE: NOT DETECTED
HCOV 229E RNA SPEC QL NAA+PROBE: NOT DETECTED
HCOV HKU1 RNA SPEC QL NAA+PROBE: NOT DETECTED
HCOV NL63 RNA SPEC QL NAA+PROBE: NOT DETECTED
HCOV OC43 RNA SPEC QL NAA+PROBE: NOT DETECTED
HCT VFR BLD AUTO: 48.6 % (ref 34–46.6)
HGB BLD-MCNC: 15.8 G/DL (ref 12–15.9)
HMPV RNA NPH QL NAA+NON-PROBE: NOT DETECTED
HOLD SPECIMEN: NORMAL
HOLD SPECIMEN: NORMAL
HPIV1 RNA ISLT QL NAA+PROBE: NOT DETECTED
HPIV2 RNA SPEC QL NAA+PROBE: NOT DETECTED
HPIV3 RNA NPH QL NAA+PROBE: NOT DETECTED
HPIV4 P GENE NPH QL NAA+PROBE: NOT DETECTED
LYMPHOCYTES # BLD AUTO: 1.8 10*3/MM3 (ref 0.7–3.1)
LYMPHOCYTES NFR BLD AUTO: 21.5 % (ref 19.6–45.3)
M PNEUMO IGG SER IA-ACNC: NOT DETECTED
MCH RBC QN AUTO: 29.7 PG (ref 26.6–33)
MCHC RBC AUTO-ENTMCNC: 32.6 G/DL (ref 31.5–35.7)
MCV RBC AUTO: 91.3 FL (ref 79–97)
MONOCYTES # BLD AUTO: 0.5 10*3/MM3 (ref 0.1–0.9)
MONOCYTES NFR BLD AUTO: 6.6 % (ref 5–12)
NEUTROPHILS NFR BLD AUTO: 5.8 10*3/MM3 (ref 1.7–7)
NEUTROPHILS NFR BLD AUTO: 70.2 % (ref 42.7–76)
NRBC BLD AUTO-RTO: 0.1 /100 WBC (ref 0–0.2)
NT-PROBNP SERPL-MCNC: 253.7 PG/ML (ref 0–900)
PLATELET # BLD AUTO: 383 10*3/MM3 (ref 140–450)
PMV BLD AUTO: 8.4 FL (ref 6–12)
POTASSIUM SERPL-SCNC: 5.2 MMOL/L (ref 3.5–5.2)
PROCALCITONIN SERPL-MCNC: <0.02 NG/ML (ref 0–0.25)
PROT SERPL-MCNC: 7.3 G/DL (ref 6–8.5)
RBC # BLD AUTO: 5.33 10*6/MM3 (ref 3.77–5.28)
RHINOVIRUS RNA SPEC NAA+PROBE: NOT DETECTED
RSV RNA NPH QL NAA+NON-PROBE: NOT DETECTED
SARS-COV-2 RNA NPH QL NAA+NON-PROBE: NOT DETECTED
SODIUM SERPL-SCNC: 141 MMOL/L (ref 136–145)
TROPONIN T SERPL HS-MCNC: 8 NG/L
WBC NRBC COR # BLD: 8.3 10*3/MM3 (ref 3.4–10.8)
WHOLE BLOOD HOLD COAG: NORMAL
WHOLE BLOOD HOLD SPECIMEN: NORMAL

## 2023-08-16 PROCEDURE — 0202U NFCT DS 22 TRGT SARS-COV-2: CPT | Performed by: EMERGENCY MEDICINE

## 2023-08-16 PROCEDURE — 94640 AIRWAY INHALATION TREATMENT: CPT

## 2023-08-16 PROCEDURE — 96365 THER/PROPH/DIAG IV INF INIT: CPT

## 2023-08-16 PROCEDURE — 94799 UNLISTED PULMONARY SVC/PX: CPT

## 2023-08-16 PROCEDURE — 25010000002 METHYLPREDNISOLONE PER 125 MG: Performed by: EMERGENCY MEDICINE

## 2023-08-16 PROCEDURE — 85025 COMPLETE CBC W/AUTO DIFF WBC: CPT | Performed by: EMERGENCY MEDICINE

## 2023-08-16 PROCEDURE — 85379 FIBRIN DEGRADATION QUANT: CPT | Performed by: EMERGENCY MEDICINE

## 2023-08-16 PROCEDURE — 71045 X-RAY EXAM CHEST 1 VIEW: CPT

## 2023-08-16 PROCEDURE — 84145 PROCALCITONIN (PCT): CPT | Performed by: EMERGENCY MEDICINE

## 2023-08-16 PROCEDURE — 93005 ELECTROCARDIOGRAM TRACING: CPT

## 2023-08-16 PROCEDURE — 83880 ASSAY OF NATRIURETIC PEPTIDE: CPT | Performed by: EMERGENCY MEDICINE

## 2023-08-16 PROCEDURE — 80053 COMPREHEN METABOLIC PANEL: CPT | Performed by: EMERGENCY MEDICINE

## 2023-08-16 PROCEDURE — 25010000002 MAGNESIUM SULFATE IN D5W 1G/100ML (PREMIX) 1-5 GM/100ML-% SOLUTION: Performed by: EMERGENCY MEDICINE

## 2023-08-16 PROCEDURE — 36415 COLL VENOUS BLD VENIPUNCTURE: CPT

## 2023-08-16 PROCEDURE — 84484 ASSAY OF TROPONIN QUANT: CPT | Performed by: EMERGENCY MEDICINE

## 2023-08-16 PROCEDURE — 87040 BLOOD CULTURE FOR BACTERIA: CPT | Performed by: EMERGENCY MEDICINE

## 2023-08-16 PROCEDURE — 83605 ASSAY OF LACTIC ACID: CPT

## 2023-08-16 PROCEDURE — 99284 EMERGENCY DEPT VISIT MOD MDM: CPT

## 2023-08-16 PROCEDURE — 96375 TX/PRO/DX INJ NEW DRUG ADDON: CPT

## 2023-08-16 RX ORDER — ALBUTEROL SULFATE 2.5 MG/3ML
2.5 SOLUTION RESPIRATORY (INHALATION) ONCE
Status: COMPLETED | OUTPATIENT
Start: 2023-08-16 | End: 2023-08-16

## 2023-08-16 RX ORDER — PREDNISONE 20 MG/1
20 TABLET ORAL 2 TIMES DAILY
Qty: 10 TABLET | Refills: 0 | Status: SHIPPED | OUTPATIENT
Start: 2023-08-16 | End: 2023-08-21

## 2023-08-16 RX ORDER — METHYLPREDNISOLONE SODIUM SUCCINATE 125 MG/2ML
125 INJECTION, POWDER, LYOPHILIZED, FOR SOLUTION INTRAMUSCULAR; INTRAVENOUS ONCE
Status: COMPLETED | OUTPATIENT
Start: 2023-08-16 | End: 2023-08-16

## 2023-08-16 RX ORDER — ALBUTEROL SULFATE 2.5 MG/3ML
2.5 SOLUTION RESPIRATORY (INHALATION)
Status: COMPLETED | OUTPATIENT
Start: 2023-08-16 | End: 2023-08-16

## 2023-08-16 RX ORDER — SODIUM CHLORIDE 0.9 % (FLUSH) 0.9 %
10 SYRINGE (ML) INJECTION AS NEEDED
Status: DISCONTINUED | OUTPATIENT
Start: 2023-08-16 | End: 2023-08-16 | Stop reason: HOSPADM

## 2023-08-16 RX ORDER — MAGNESIUM SULFATE 1 G/100ML
1 INJECTION INTRAVENOUS ONCE
Status: COMPLETED | OUTPATIENT
Start: 2023-08-16 | End: 2023-08-16

## 2023-08-16 RX ADMIN — MAGNESIUM SULFATE IN DEXTROSE 1 G: 10 INJECTION, SOLUTION INTRAVENOUS at 08:03

## 2023-08-16 RX ADMIN — ALBUTEROL SULFATE 2.5 MG: 2.5 SOLUTION RESPIRATORY (INHALATION) at 08:00

## 2023-08-16 RX ADMIN — ALBUTEROL SULFATE 2.5 MG: 2.5 SOLUTION RESPIRATORY (INHALATION) at 06:32

## 2023-08-16 RX ADMIN — METHYLPREDNISOLONE SODIUM SUCCINATE 125 MG: 125 INJECTION, POWDER, FOR SOLUTION INTRAMUSCULAR; INTRAVENOUS at 06:39

## 2023-08-16 RX ADMIN — ALBUTEROL SULFATE 2.5 MG: 2.5 SOLUTION RESPIRATORY (INHALATION) at 07:45

## 2023-08-16 NOTE — ED NOTES
Pt arrived via [facility] c/o SOA x1 week. Pt states she was seen in urgent care on Friday and was given antibiotics and steroids for bronchitis but states she is not feeling any better. Pt does not appear to be in distress in triage and is able to speak in full sentences

## 2023-08-16 NOTE — DISCHARGE INSTRUCTIONS
Take medication as prescribed.  Continue current home medications.  Mucinex for congestion.  Follow-up with your primary care provider.  Return for new or worsening symptoms.

## 2023-08-16 NOTE — ED PROVIDER NOTES
Subjective   History of Present Illness  63-year-old female with 1 week of cough, productive of yellow sputum, congestion, wheezing, no chest pain, fever.  Patient seen at urgent care on 2013 and got Solu-Medrol 80 mg IM as well as a prescription for Phenergan with codeine/doxycycline/Medrol Dosepak.  Patient states she is no better.    Review of Systems   HENT:  Positive for congestion.    Respiratory:  Positive for cough, shortness of breath and wheezing.    All other systems reviewed and are negative.    Past Medical History:   Diagnosis Date    Graves disease     Hyperlipidemia     Hyperthyroidism 2022       Allergies   Allergen Reactions    Sulfa Antibiotics Hives       Past Surgical History:   Procedure Laterality Date    APPENDECTOMY      BREAST BIOPSY Left 2020    stereo     SECTION  , ,     EXCISION BAKERS CYST KNEE Left     POSTPARTUM TUBAL LIGATION  1993    TONSILLECTOMY      TUBAL ABDOMINAL LIGATION         Family History   Problem Relation Age of Onset    Colon cancer Father     Colon cancer Maternal Grandmother     Colon cancer Maternal Grandfather     Colon cancer Paternal Grandfather        Social History     Socioeconomic History    Marital status:    Tobacco Use    Smoking status: Former     Packs/day: 1.00     Years: 20.00     Pack years: 20.00     Types: Cigarettes     Quit date: 2011     Years since quittin.2     Passive exposure: Current    Smokeless tobacco: Never   Vaping Use    Vaping Use: Never used   Substance and Sexual Activity    Alcohol use: Not Currently    Drug use: Never    Sexual activity: Defer           Objective   Physical Exam  Constitutional:       Comments: Thin 63-year-old female in no acute distress   HENT:      Head: Normocephalic and atraumatic.      Mouth/Throat:      Mouth: Mucous membranes are moist.      Pharynx: Oropharynx is clear.   Cardiovascular:      Rate and Rhythm: Normal rate and regular rhythm.   Pulmonary:       Comments: No distress, decreased air movement with faint rhonchi and wheezes bilaterally  Musculoskeletal:         General: No swelling or tenderness. Normal range of motion.   Skin:     General: Skin is warm and dry.      Capillary Refill: Capillary refill takes less than 2 seconds.   Neurological:      General: No focal deficit present.      Mental Status: She is oriented to person, place, and time.   Psychiatric:         Mood and Affect: Mood normal.         Behavior: Behavior normal.       Procedures           ED Course  ED Course as of 08/16/23 0858   Wed Aug 16, 2023   0736 Care assumed from Dr. Song pending labs and disposition. [MD]   0831 CBC and metabolic panel unremarkable.  High-sensitivity troponin is within normal limits at 8.  proBNP within normal limits at 253.  D-dimer is within normal limits at 31.  Viral respiratory panel is negative.  Chest x-ray shows no acute abnormality. [MD]   0832 On my examination, patient sitting up in bed resting quietly and in no distress.  She has no new complaints.  Findings were discussed with her.  Feel the patient would benefit from admission to the hospital as she has had no improvement with outpatient therapy/medications.  She does not want to stay.    She was ambulated around the department and maintained O2 saturation between 90 and 91%.  She was not short of breath with ambulation and says she felt great.    She will be discharged home with a few more days of steroids and to finish out her antibiotics.  She is instructed to follow-up with primary care provider but was given warning signs for prompt return to the ED and she voiced understanding. [MD]      ED Course User Index  [MD] Dianne Kulkarni APRN      Labs Reviewed   COMPREHENSIVE METABOLIC PANEL - Abnormal; Notable for the following components:       Result Value    Glucose 107 (*)     BUN 31 (*)     Creatinine 0.52 (*)     CO2 30.0 (*)     BUN/Creatinine Ratio 59.6 (*)     All other components within  "normal limits    Narrative:     GFR Normal >60  Chronic Kidney Disease <60  Kidney Failure <15     CBC WITH AUTO DIFFERENTIAL - Abnormal; Notable for the following components:    RBC 5.33 (*)     Hematocrit 48.6 (*)     All other components within normal limits   RESPIRATORY PANEL PCR W/ COVID-19 (SARS-COV-2) LAURA/AARTI/ITA/PAD/COR/MAD/[facility] IN-HOUSE, NP SWAB IN UTM/VTP, 3-4 HR TAT - Normal    Narrative:     In the setting of a positive respiratory panel with a viral infection PLUS a negative procalcitonin without other underlying concern for bacterial infection, consider observing off antibiotics or discontinuation of antibiotics and continue supportive care. If the respiratory panel is positive for atypical bacterial infection (Bordetella pertussis, Chlamydophila pneumoniae, or Mycoplasma pneumoniae), consider antibiotic de-escalation to target atypical bacterial infection.   PROCALCITONIN - Normal    Narrative:     As a Marker for Sepsis (Non-Neonates):    1. <0.5 ng/mL represents a low risk of severe sepsis and/or septic shock.  2. >2 ng/mL represents a high risk of severe sepsis and/or septic shock.    As a Marker for Lower Respiratory Tract Infections that require antibiotic therapy:    PCT on Admission    Antibiotic Therapy       6-12 Hrs later    >0.5                Strongly Recommended  >0.25 - <0.5        Recommended   0.1 - 0.25          Discouraged              Remeasure/reassess PCT  <0.1                Strongly Discouraged     Remeasure/reassess PCT    As 28 day mortality risk marker: \"Change in Procalcitonin Result\" (>80% or <=80%) if Day 0 (or Day 1) and Day 4 values are available. Refer to http://www.[employer]-pct-calculator.com    Change in PCT <=80%  A decrease of PCT levels below or equal to 80% defines a positive change in PCT test result representing a higher risk for 28-day all-cause mortality of patients diagnosed with severe sepsis for septic shock.    Change in PCT >80%  A decrease of PCT levels of " "more than 80% defines a negative change in PCT result representing a lower risk for 28-day all-cause mortality of patients diagnosed with severe sepsis or septic shock.      BNP (IN-HOUSE) - Normal    Narrative:     Among patients with dyspnea, NT-proBNP is highly sensitive for the detection of acute congestive heart failure. In addition NT-proBNP of <300 pg/ml effectively rules out acute congestive heart failure with 99% negative predictive value.     SINGLE HSTROPONIN T - Normal    Narrative:     High Sensitive Troponin T Reference Range:  <10.0 ng/L- Negative Female for AMI  <15.0 ng/L- Negative Male for AMI  >=10 - Abnormal Female indicating possible myocardial injury.  >=15 - Abnormal Male indicating possible myocardial injury.   Clinicians would have to utilize clinical acumen, EKG, Troponin, and serial changes to determine if it is an Acute Myocardial Infarction or myocardial injury due to an underlying chronic condition.        D-DIMER, QUANTITATIVE - Normal    Narrative:     According to the assay 's published package insert, a normal (<0.50 mg/L (FEU)) D-dimer result in conjunction with a non-high clinical probability assessment, excludes deep vein thrombosis (DVT) and pulmonary embolism (PE) with high sensitivity.    D-dimer values increase with age and this can make VTE exclusion of an older population difficult. To address this, the [employer] College [employer], based on best available evidence and recent guidelines, recommends that clinicians use age-adjusted D-dimer thresholds in patients greater than 50 years of age with: a) a low probability of PE who do not meet all Pulmonary Embolism Rule Out Criteria, or b) in those with intermediate probability of PE.   The formula for an age-adjusted D-dimer cut-off is \"age/100\".  For example, a 60 year old patient would have an age-adjusted cut-off of 0.60 mg/L (FEU) and an 80 year old 0.80 mg/L (FEU).   POC LACTATE - Normal   BLOOD CULTURE "   BLOOD CULTURE   RAINBOW DRAW    Narrative:     The following orders were created for panel order [place] Draw.  Procedure                               Abnormality         Status                     ---------                               -----------         ------                     Green Top (Gel)[[id number]]                                  Final result               Lavender Top[[id number]]                                     Final result               Gold Top - SST[[id number]]                                   Final result               Light Blue Top[[id number]]                                   Final result                 Please view results for these tests on the individual orders.   POC LACTATE   CBC AND DIFFERENTIAL    Narrative:     The following orders were created for panel order CBC & Differential.  Procedure                               Abnormality         Status                     ---------                               -----------         ------                     CBC Auto Differential[[id number]]        Abnormal            Final result                 Please view results for these tests on the individual orders.   GREEN TOP   LAVENDER TOP   GOLD TOP - SST   LIGHT BLUE TOP     XR Chest 1 View    Result Date: 8/16/2023  Impression: No acute cardiopulmonary process. Stable chronic interstitial changes and scarring. Electronically Signed: Becki Estrada MD  8/16/2023 6:18 AM EDT  Workstation ID: [id number]   Medications   sodium chloride 0.9 % flush 10 mL (has no administration in time range)   albuterol (PROVENTIL) nebulizer solution 0.083% 2.5 mg/3mL (2.5 mg Nebulization Given 8/16/23 0632)   methylPREDNISolone sodium succinate (SOLU-Medrol) injection 125 mg (125 mg Intravenous Given 8/16/23 0639)   albuterol (PROVENTIL) nebulizer solution 0.083% 2.5 mg/3mL (2.5 mg Nebulization Given 8/16/23 0800)   magnesium sulfate in D5W 1g/100mL (PREMIX) (1 g Intravenous New Bag 8/16/23 0803)                                             Medical Decision Making  Problems Addressed:  COPD exacerbation: complicated acute illness or injury  Shortness of breath: complicated acute illness or injury    Amount and/or Complexity of Data Reviewed  Labs: ordered.  Radiology: ordered.  ECG/medicine tests: ordered.     Details: EKG interpretation: Sinus bradycardia with occasional PAC, no acute ST elevation    Risk  Prescription drug management.        Final diagnoses:   Shortness of breath   COPD exacerbation       ED Disposition  ED Disposition       ED Disposition   Discharge    Condition   Stable    Comment   --               Michael Cho MD  [address]  [place] [address] [phone]    Schedule an appointment as soon as possible for a visit            Medication List        New Prescriptions      predniSONE 20 MG tablet  Commonly known as: DELTASONE  Take 1 tablet by mouth 2 (Two) Times a Day for 5 days.               Where to Get Your Medications        These medications were sent to [id number] - [place], [place] - [address] - [phone] PH - [phone] FX  [address] [place] [address]      Phone: [phone]   predniSONE 20 MG tablet            Dianne Kulkarni, APRVALENTINO  08/16/23 7512

## 2023-08-17 LAB — QT INTERVAL: 425 MS

## 2023-08-20 ENCOUNTER — APPOINTMENT (OUTPATIENT)
Dept: GENERAL RADIOLOGY | Facility: HOSPITAL | Age: 63
DRG: 193 | End: 2023-08-20
Payer: COMMERCIAL

## 2023-08-20 ENCOUNTER — HOSPITAL ENCOUNTER (INPATIENT)
Facility: HOSPITAL | Age: 63
LOS: 2 days | Discharge: HOME | DRG: 193 | End: 2023-08-23
Attending: EMERGENCY MEDICINE | Admitting: HOSPITALIST
Payer: COMMERCIAL

## 2023-08-20 DIAGNOSIS — R09.02 COPD WITH HYPOXIA: ICD-10-CM

## 2023-08-20 DIAGNOSIS — R09.02 HYPOXIA: ICD-10-CM

## 2023-08-20 DIAGNOSIS — J18.9 PNEUMONIA DUE TO INFECTIOUS ORGANISM, UNSPECIFIED LATERALITY, UNSPECIFIED PART OF LUNG: ICD-10-CM

## 2023-08-20 DIAGNOSIS — J18.9 PNEUMONIA OF LEFT LOWER LOBE DUE TO INFECTIOUS ORGANISM: ICD-10-CM

## 2023-08-20 DIAGNOSIS — A41.9 SEPSIS, DUE TO UNSPECIFIED ORGANISM, UNSPECIFIED WHETHER ACUTE ORGAN DYSFUNCTION PRESENT: Primary | ICD-10-CM

## 2023-08-20 DIAGNOSIS — J44.9 COPD WITH HYPOXIA: ICD-10-CM

## 2023-08-20 LAB
ALBUMIN SERPL-MCNC: 3.6 G/DL (ref 3.5–5.2)
ALBUMIN/GLOB SERPL: 1.4 G/DL
ALP SERPL-CCNC: 80 U/L (ref 39–117)
ALT SERPL W P-5'-P-CCNC: 22 U/L (ref 1–33)
ANION GAP SERPL CALCULATED.3IONS-SCNC: 10 MMOL/L (ref 5–15)
ARTERIAL PATENCY WRIST A: POSITIVE
AST SERPL-CCNC: 17 U/L (ref 1–32)
ATMOSPHERIC PRESS: ABNORMAL MM[HG]
B PARAPERT DNA SPEC QL NAA+PROBE: NOT DETECTED
B PERT DNA SPEC QL NAA+PROBE: NOT DETECTED
BASE EXCESS BLDA CALC-SCNC: 1.2 MMOL/L (ref 0–3)
BDY SITE: ABNORMAL
BILIRUB SERPL-MCNC: 0.5 MG/DL (ref 0–1.2)
BUN SERPL-MCNC: 22 MG/DL (ref 8–23)
BUN/CREAT SERPL: 52.4 (ref 7–25)
C PNEUM DNA NPH QL NAA+NON-PROBE: NOT DETECTED
CALCIUM SPEC-SCNC: 8.4 MG/DL (ref 8.6–10.5)
CHLORIDE SERPL-SCNC: 105 MMOL/L (ref 98–107)
CO2 BLDA-SCNC: 26.6 MMOL/L (ref 22–29)
CO2 SERPL-SCNC: 27 MMOL/L (ref 22–29)
CREAT SERPL-MCNC: 0.42 MG/DL (ref 0.57–1)
D-LACTATE SERPL-SCNC: 1.9 MMOL/L (ref 0.3–2)
DEPRECATED RDW RBC AUTO: 48.6 FL (ref 37–54)
EGFRCR SERPLBLD CKD-EPI 2021: 109.4 ML/MIN/1.73
ERYTHROCYTE [DISTWIDTH] IN BLOOD BY AUTOMATED COUNT: 15.3 % (ref 12.3–15.4)
FLUAV SUBTYP SPEC NAA+PROBE: NOT DETECTED
FLUBV RNA ISLT QL NAA+PROBE: NOT DETECTED
GLOBULIN UR ELPH-MCNC: 2.5 GM/DL
GLUCOSE SERPL-MCNC: 156 MG/DL (ref 65–99)
HADV DNA SPEC NAA+PROBE: NOT DETECTED
HCO3 BLDA-SCNC: 25.4 MMOL/L (ref 21–28)
HCOV 229E RNA SPEC QL NAA+PROBE: NOT DETECTED
HCOV HKU1 RNA SPEC QL NAA+PROBE: NOT DETECTED
HCOV NL63 RNA SPEC QL NAA+PROBE: NOT DETECTED
HCOV OC43 RNA SPEC QL NAA+PROBE: NOT DETECTED
HCT VFR BLD AUTO: 44.8 % (ref 34–46.6)
HEMODILUTION: NO
HGB BLD-MCNC: 14.4 G/DL (ref 12–15.9)
HMPV RNA NPH QL NAA+NON-PROBE: NOT DETECTED
HOLD SPECIMEN: NORMAL
HOLD SPECIMEN: NORMAL
HPIV1 RNA ISLT QL NAA+PROBE: NOT DETECTED
HPIV2 RNA SPEC QL NAA+PROBE: NOT DETECTED
HPIV3 RNA NPH QL NAA+PROBE: NOT DETECTED
HPIV4 P GENE NPH QL NAA+PROBE: NOT DETECTED
INHALED O2 CONCENTRATION: 36 %
LARGE PLATELETS: ABNORMAL
LYMPHOCYTES # BLD MANUAL: 1.19 10*3/MM3 (ref 0.7–3.1)
LYMPHOCYTES NFR BLD MANUAL: 11 % (ref 5–12)
M PNEUMO IGG SER IA-ACNC: NOT DETECTED
MCH RBC QN AUTO: 29.5 PG (ref 26.6–33)
MCHC RBC AUTO-ENTMCNC: 32.2 G/DL (ref 31.5–35.7)
MCV RBC AUTO: 91.5 FL (ref 79–97)
MODALITY: ABNORMAL
MONOCYTES # BLD: 2.18 10*3/MM3 (ref 0.1–0.9)
NEUTROPHILS # BLD AUTO: 16.43 10*3/MM3 (ref 1.7–7)
NEUTROPHILS NFR BLD MANUAL: 77 % (ref 42.7–76)
NEUTS BAND NFR BLD MANUAL: 6 % (ref 0–5)
PCO2 BLDA: 38.4 MM HG (ref 35–48)
PH BLDA: 7.43 PH UNITS (ref 7.35–7.45)
PLATELET # BLD AUTO: 442 10*3/MM3 (ref 140–450)
PMV BLD AUTO: 8.1 FL (ref 6–12)
PO2 BLDA: 74.5 MM HG (ref 83–108)
POTASSIUM SERPL-SCNC: 4.3 MMOL/L (ref 3.5–5.2)
PROT SERPL-MCNC: 6.1 G/DL (ref 6–8.5)
RBC # BLD AUTO: 4.9 10*6/MM3 (ref 3.77–5.28)
RBC MORPH BLD: NORMAL
RHINOVIRUS RNA SPEC NAA+PROBE: NOT DETECTED
RSV RNA NPH QL NAA+NON-PROBE: NOT DETECTED
SAO2 % BLDCOA: 95.2 % (ref 94–98)
SARS-COV-2 RNA NPH QL NAA+NON-PROBE: NOT DETECTED
SCAN SLIDE: NORMAL
SODIUM SERPL-SCNC: 142 MMOL/L (ref 136–145)
VARIANT LYMPHS NFR BLD MANUAL: 1 % (ref 0–5)
VARIANT LYMPHS NFR BLD MANUAL: 5 % (ref 19.6–45.3)
WBC MORPH BLD: NORMAL
WBC NRBC COR # BLD: 19.8 10*3/MM3 (ref 3.4–10.8)
WHOLE BLOOD HOLD COAG: NORMAL

## 2023-08-20 PROCEDURE — 83605 ASSAY OF LACTIC ACID: CPT

## 2023-08-20 PROCEDURE — 71045 X-RAY EXAM CHEST 1 VIEW: CPT

## 2023-08-20 PROCEDURE — 80053 COMPREHEN METABOLIC PANEL: CPT | Performed by: EMERGENCY MEDICINE

## 2023-08-20 PROCEDURE — 36415 COLL VENOUS BLD VENIPUNCTURE: CPT

## 2023-08-20 PROCEDURE — 0202U NFCT DS 22 TRGT SARS-COV-2: CPT | Performed by: EMERGENCY MEDICINE

## 2023-08-20 PROCEDURE — 87040 BLOOD CULTURE FOR BACTERIA: CPT | Performed by: EMERGENCY MEDICINE

## 2023-08-20 PROCEDURE — 85007 BL SMEAR W/DIFF WBC COUNT: CPT | Performed by: EMERGENCY MEDICINE

## 2023-08-20 PROCEDURE — 99285 EMERGENCY DEPT VISIT HI MDM: CPT

## 2023-08-20 PROCEDURE — 83036 HEMOGLOBIN GLYCOSYLATED A1C: CPT | Performed by: NURSE PRACTITIONER

## 2023-08-20 PROCEDURE — 0 CEFEPIME PER 500 MG: Performed by: EMERGENCY MEDICINE

## 2023-08-20 PROCEDURE — 85025 COMPLETE CBC W/AUTO DIFF WBC: CPT | Performed by: EMERGENCY MEDICINE

## 2023-08-20 PROCEDURE — 36600 WITHDRAWAL OF ARTERIAL BLOOD: CPT

## 2023-08-20 PROCEDURE — 93005 ELECTROCARDIOGRAM TRACING: CPT | Performed by: EMERGENCY MEDICINE

## 2023-08-20 PROCEDURE — 82803 BLOOD GASES ANY COMBINATION: CPT

## 2023-08-20 PROCEDURE — 83735 ASSAY OF MAGNESIUM: CPT | Performed by: NURSE PRACTITIONER

## 2023-08-20 PROCEDURE — 25010000002 MORPHINE PER 10 MG: Performed by: EMERGENCY MEDICINE

## 2023-08-20 PROCEDURE — 25010000002 ONDANSETRON PER 1 MG: Performed by: EMERGENCY MEDICINE

## 2023-08-20 RX ORDER — SODIUM CHLORIDE 0.9 % (FLUSH) 0.9 %
10 SYRINGE (ML) INJECTION AS NEEDED
Status: DISCONTINUED | OUTPATIENT
Start: 2023-08-20 | End: 2023-08-23 | Stop reason: HOSPADM

## 2023-08-20 RX ORDER — ONDANSETRON 2 MG/ML
4 INJECTION INTRAMUSCULAR; INTRAVENOUS ONCE
Status: COMPLETED | OUTPATIENT
Start: 2023-08-20 | End: 2023-08-20

## 2023-08-20 RX ADMIN — ONDANSETRON 4 MG: 2 INJECTION INTRAMUSCULAR; INTRAVENOUS at 21:20

## 2023-08-20 RX ADMIN — MORPHINE SULFATE 4 MG: 4 INJECTION, SOLUTION INTRAMUSCULAR; INTRAVENOUS at 21:21

## 2023-08-20 RX ADMIN — CEFEPIME 2000 MG: 2 INJECTION, POWDER, FOR SOLUTION INTRAVENOUS at 21:23

## 2023-08-20 RX ADMIN — DOXYCYCLINE 100 MG: 100 INJECTION, POWDER, LYOPHILIZED, FOR SOLUTION INTRAVENOUS at 23:46

## 2023-08-20 NOTE — Clinical Note
Level of Care: Med/Surg [1]   Diagnosis: Pneumonia [[id number]]   Admitting Physician: SAVANNAH PINEDA [[id number]]   Attending Physician: ALEC QUEVEDO [[id number]]   Certification: I Certify That Inpatient Hospital Services Are Medically Necessary For Greater Than 2 Midnights

## 2023-08-21 LAB
ANION GAP SERPL CALCULATED.3IONS-SCNC: 12 MMOL/L (ref 5–15)
ANION GAP SERPL CALCULATED.3IONS-SCNC: 6 MMOL/L (ref 5–15)
BACTERIA SPEC AEROBE CULT: NORMAL
BACTERIA SPEC AEROBE CULT: NORMAL
BUN SERPL-MCNC: 24 MG/DL (ref 8–23)
BUN SERPL-MCNC: 29 MG/DL (ref 8–23)
BUN/CREAT SERPL: 48 (ref 7–25)
BUN/CREAT SERPL: 58 (ref 7–25)
CALCIUM SPEC-SCNC: 8.6 MG/DL (ref 8.6–10.5)
CALCIUM SPEC-SCNC: 9 MG/DL (ref 8.6–10.5)
CHLORIDE SERPL-SCNC: 101 MMOL/L (ref 98–107)
CHLORIDE SERPL-SCNC: 98 MMOL/L (ref 98–107)
CO2 SERPL-SCNC: 25 MMOL/L (ref 22–29)
CO2 SERPL-SCNC: 31 MMOL/L (ref 22–29)
CREAT SERPL-MCNC: 0.5 MG/DL (ref 0.57–1)
CREAT SERPL-MCNC: 0.5 MG/DL (ref 0.57–1)
DACRYOCYTES BLD QL SMEAR: ABNORMAL
DEPRECATED RDW RBC AUTO: 49.9 FL (ref 37–54)
EGFRCR SERPLBLD CKD-EPI 2021: 104.9 ML/MIN/1.73
EGFRCR SERPLBLD CKD-EPI 2021: 104.9 ML/MIN/1.73
ERYTHROCYTE [DISTWIDTH] IN BLOOD BY AUTOMATED COUNT: 15.1 % (ref 12.3–15.4)
GLUCOSE SERPL-MCNC: 138 MG/DL (ref 65–99)
GLUCOSE SERPL-MCNC: 188 MG/DL (ref 65–99)
HBA1C MFR BLD: 5.7 % (ref 4.8–5.6)
HCT VFR BLD AUTO: 44.4 % (ref 34–46.6)
HGB BLD-MCNC: 14.6 G/DL (ref 12–15.9)
LARGE PLATELETS: ABNORMAL
LYMPHOCYTES # BLD MANUAL: 1.04 10*3/MM3 (ref 0.7–3.1)
LYMPHOCYTES NFR BLD MANUAL: 2 % (ref 5–12)
MAGNESIUM SERPL-MCNC: 2.7 MG/DL (ref 1.6–2.4)
MCH RBC QN AUTO: 29.8 PG (ref 26.6–33)
MCHC RBC AUTO-ENTMCNC: 32.8 G/DL (ref 31.5–35.7)
MCV RBC AUTO: 90.8 FL (ref 79–97)
METAMYELOCYTES NFR BLD MANUAL: 4 % (ref 0–0)
MONOCYTES # BLD: 0.3 10*3/MM3 (ref 0.1–0.9)
MRSA DNA SPEC QL NAA+PROBE: NORMAL
NEUTROPHILS # BLD AUTO: 12.88 10*3/MM3 (ref 1.7–7)
NEUTROPHILS NFR BLD MANUAL: 61 % (ref 42.7–76)
NEUTS BAND NFR BLD MANUAL: 26 % (ref 0–5)
PLATELET # BLD AUTO: 348 10*3/MM3 (ref 140–450)
PMV BLD AUTO: 7.9 FL (ref 6–12)
POIKILOCYTOSIS BLD QL SMEAR: ABNORMAL
POTASSIUM SERPL-SCNC: 4.6 MMOL/L (ref 3.5–5.2)
POTASSIUM SERPL-SCNC: 5.2 MMOL/L (ref 3.5–5.2)
QT INTERVAL: 302 MS
RBC # BLD AUTO: 4.89 10*6/MM3 (ref 3.77–5.28)
SCAN SLIDE: NORMAL
SMALL PLATELETS BLD QL SMEAR: ADEQUATE
SODIUM SERPL-SCNC: 135 MMOL/L (ref 136–145)
SODIUM SERPL-SCNC: 138 MMOL/L (ref 136–145)
VARIANT LYMPHS NFR BLD MANUAL: 2 % (ref 0–5)
VARIANT LYMPHS NFR BLD MANUAL: 5 % (ref 19.6–45.3)
WBC MORPH BLD: NORMAL
WBC NRBC COR # BLD: 14.8 10*3/MM3 (ref 3.4–10.8)

## 2023-08-21 PROCEDURE — 25010000002 HYDROMORPHONE 1 MG/ML SOLUTION: Performed by: INTERNAL MEDICINE

## 2023-08-21 PROCEDURE — 80048 BASIC METABOLIC PNL TOTAL CA: CPT | Performed by: NURSE PRACTITIONER

## 2023-08-21 PROCEDURE — 25010000002 AZITHROMYCIN PER 500 MG: Performed by: NURSE PRACTITIONER

## 2023-08-21 PROCEDURE — G0378 HOSPITAL OBSERVATION PER HR: HCPCS

## 2023-08-21 PROCEDURE — 25010000002 ENOXAPARIN PER 10 MG: Performed by: NURSE PRACTITIONER

## 2023-08-21 PROCEDURE — 25010000002 HYDROMORPHONE 1 MG/ML SOLUTION: Performed by: NURSE PRACTITIONER

## 2023-08-21 PROCEDURE — 25010000002 CEFTRIAXONE PER 250 MG: Performed by: NURSE PRACTITIONER

## 2023-08-21 PROCEDURE — 84439 ASSAY OF FREE THYROXINE: CPT | Performed by: NURSE PRACTITIONER

## 2023-08-21 PROCEDURE — 36415 COLL VENOUS BLD VENIPUNCTURE: CPT | Performed by: NURSE PRACTITIONER

## 2023-08-21 PROCEDURE — 85007 BL SMEAR W/DIFF WBC COUNT: CPT | Performed by: NURSE PRACTITIONER

## 2023-08-21 PROCEDURE — 87641 MR-STAPH DNA AMP PROBE: CPT | Performed by: NURSE PRACTITIONER

## 2023-08-21 PROCEDURE — 85025 COMPLETE CBC W/AUTO DIFF WBC: CPT | Performed by: NURSE PRACTITIONER

## 2023-08-21 RX ORDER — AMOXICILLIN 250 MG
2 CAPSULE ORAL 2 TIMES DAILY
Status: DISCONTINUED | OUTPATIENT
Start: 2023-08-21 | End: 2023-08-23 | Stop reason: HOSPADM

## 2023-08-21 RX ORDER — INSULIN LISPRO 100 [IU]/ML
2-7 INJECTION, SOLUTION INTRAVENOUS; SUBCUTANEOUS
Status: DISCONTINUED | OUTPATIENT
Start: 2023-08-21 | End: 2023-08-21

## 2023-08-21 RX ORDER — ONDANSETRON 2 MG/ML
4 INJECTION INTRAMUSCULAR; INTRAVENOUS EVERY 6 HOURS PRN
Status: DISCONTINUED | OUTPATIENT
Start: 2023-08-21 | End: 2023-08-23 | Stop reason: HOSPADM

## 2023-08-21 RX ORDER — ONDANSETRON 4 MG/1
4 TABLET, FILM COATED ORAL EVERY 6 HOURS PRN
Status: DISCONTINUED | OUTPATIENT
Start: 2023-08-21 | End: 2023-08-23 | Stop reason: HOSPADM

## 2023-08-21 RX ORDER — METHIMAZOLE 5 MG/1
5 TABLET ORAL 2 TIMES WEEKLY
Status: DISCONTINUED | OUTPATIENT
Start: 2023-08-21 | End: 2023-08-23 | Stop reason: HOSPADM

## 2023-08-21 RX ORDER — BISACODYL 10 MG
10 SUPPOSITORY, RECTAL RECTAL DAILY PRN
Status: DISCONTINUED | OUTPATIENT
Start: 2023-08-21 | End: 2023-08-23 | Stop reason: HOSPADM

## 2023-08-21 RX ORDER — ACETAMINOPHEN 160 MG/5ML
650 SOLUTION ORAL EVERY 4 HOURS PRN
Status: DISCONTINUED | OUTPATIENT
Start: 2023-08-21 | End: 2023-08-23 | Stop reason: HOSPADM

## 2023-08-21 RX ORDER — ENOXAPARIN SODIUM 100 MG/ML
40 INJECTION SUBCUTANEOUS EVERY 24 HOURS
Status: DISCONTINUED | OUTPATIENT
Start: 2023-08-21 | End: 2023-08-23 | Stop reason: HOSPADM

## 2023-08-21 RX ORDER — BISACODYL 5 MG/1
5 TABLET, DELAYED RELEASE ORAL DAILY PRN
Status: DISCONTINUED | OUTPATIENT
Start: 2023-08-21 | End: 2023-08-23 | Stop reason: HOSPADM

## 2023-08-21 RX ORDER — AMLODIPINE BESYLATE 2.5 MG/1
2.5 TABLET ORAL DAILY
COMMUNITY

## 2023-08-21 RX ORDER — ENOXAPARIN SODIUM 100 MG/ML
40 INJECTION SUBCUTANEOUS DAILY
Status: DISCONTINUED | OUTPATIENT
Start: 2023-08-22 | End: 2023-08-21

## 2023-08-21 RX ORDER — PROPRANOLOL HYDROCHLORIDE 20 MG/1
20 TABLET ORAL 2 TIMES DAILY
Status: DISCONTINUED | OUTPATIENT
Start: 2023-08-21 | End: 2023-08-23 | Stop reason: HOSPADM

## 2023-08-21 RX ORDER — BUDESONIDE AND FORMOTEROL FUMARATE DIHYDRATE 160; 4.5 UG/1; UG/1
2 AEROSOL RESPIRATORY (INHALATION)
Status: DISCONTINUED | OUTPATIENT
Start: 2023-08-21 | End: 2023-08-23 | Stop reason: HOSPADM

## 2023-08-21 RX ORDER — POLYETHYLENE GLYCOL 3350 17 G/17G
17 POWDER, FOR SOLUTION ORAL DAILY PRN
Status: DISCONTINUED | OUTPATIENT
Start: 2023-08-21 | End: 2023-08-23 | Stop reason: HOSPADM

## 2023-08-21 RX ORDER — DEXTROSE MONOHYDRATE 25 G/50ML
25 INJECTION, SOLUTION INTRAVENOUS
Status: DISCONTINUED | OUTPATIENT
Start: 2023-08-21 | End: 2023-08-21

## 2023-08-21 RX ORDER — IPRATROPIUM BROMIDE AND ALBUTEROL SULFATE 2.5; .5 MG/3ML; MG/3ML
3 SOLUTION RESPIRATORY (INHALATION) EVERY 4 HOURS PRN
Status: DISCONTINUED | OUTPATIENT
Start: 2023-08-21 | End: 2023-08-23 | Stop reason: HOSPADM

## 2023-08-21 RX ORDER — ACETAMINOPHEN 325 MG/1
650 TABLET ORAL EVERY 4 HOURS PRN
Status: DISCONTINUED | OUTPATIENT
Start: 2023-08-21 | End: 2023-08-23 | Stop reason: HOSPADM

## 2023-08-21 RX ORDER — FLUTICASONE FUROATE, UMECLIDINIUM BROMIDE AND VILANTEROL TRIFENATATE 200; 62.5; 25 UG/1; UG/1; UG/1
1 POWDER RESPIRATORY (INHALATION) DAILY
COMMUNITY

## 2023-08-21 RX ORDER — ALUMINA, MAGNESIA, AND SIMETHICONE 2400; 2400; 240 MG/30ML; MG/30ML; MG/30ML
15 SUSPENSION ORAL EVERY 6 HOURS PRN
Status: DISCONTINUED | OUTPATIENT
Start: 2023-08-21 | End: 2023-08-23 | Stop reason: HOSPADM

## 2023-08-21 RX ORDER — NICOTINE POLACRILEX 4 MG
15 LOZENGE BUCCAL
Status: DISCONTINUED | OUTPATIENT
Start: 2023-08-21 | End: 2023-08-21

## 2023-08-21 RX ORDER — CHOLECALCIFEROL (VITAMIN D3) 125 MCG
5 CAPSULE ORAL NIGHTLY PRN
Status: DISCONTINUED | OUTPATIENT
Start: 2023-08-21 | End: 2023-08-23 | Stop reason: HOSPADM

## 2023-08-21 RX ORDER — ACETAMINOPHEN 650 MG/1
650 SUPPOSITORY RECTAL EVERY 4 HOURS PRN
Status: DISCONTINUED | OUTPATIENT
Start: 2023-08-21 | End: 2023-08-23 | Stop reason: HOSPADM

## 2023-08-21 RX ORDER — PROPRANOLOL HYDROCHLORIDE 20 MG/1
20 TABLET ORAL 2 TIMES DAILY
COMMUNITY

## 2023-08-21 RX ORDER — ATORVASTATIN CALCIUM 10 MG/1
10 TABLET, FILM COATED ORAL 2 TIMES WEEKLY
COMMUNITY

## 2023-08-21 RX ORDER — GUAIFENESIN 600 MG/1
600 TABLET, EXTENDED RELEASE ORAL EVERY 12 HOURS SCHEDULED
Status: DISCONTINUED | OUTPATIENT
Start: 2023-08-21 | End: 2023-08-21

## 2023-08-21 RX ORDER — ALBUTEROL SULFATE 90 UG/1
2 AEROSOL, METERED RESPIRATORY (INHALATION) EVERY 6 HOURS PRN
Status: DISCONTINUED | OUTPATIENT
Start: 2023-08-21 | End: 2023-08-23 | Stop reason: HOSPADM

## 2023-08-21 RX ORDER — IBUPROFEN 600 MG/1
1 TABLET ORAL
Status: DISCONTINUED | OUTPATIENT
Start: 2023-08-21 | End: 2023-08-21

## 2023-08-21 RX ADMIN — AZITHROMYCIN MONOHYDRATE 500 MG: 500 INJECTION, POWDER, LYOPHILIZED, FOR SOLUTION INTRAVENOUS at 10:12

## 2023-08-21 RX ADMIN — PROPRANOLOL HYDROCHLORIDE 20 MG: 20 TABLET ORAL at 13:43

## 2023-08-21 RX ADMIN — HYDROMORPHONE HYDROCHLORIDE 0.5 MG: 1 INJECTION, SOLUTION INTRAMUSCULAR; INTRAVENOUS; SUBCUTANEOUS at 21:15

## 2023-08-21 RX ADMIN — CEFTRIAXONE 2000 MG: 2 INJECTION, POWDER, FOR SOLUTION INTRAMUSCULAR; INTRAVENOUS at 08:49

## 2023-08-21 RX ADMIN — HYDROMORPHONE HYDROCHLORIDE 0.5 MG: 1 INJECTION, SOLUTION INTRAMUSCULAR; INTRAVENOUS; SUBCUTANEOUS at 01:30

## 2023-08-21 RX ADMIN — PROPRANOLOL HYDROCHLORIDE 20 MG: 20 TABLET ORAL at 21:13

## 2023-08-21 RX ADMIN — METHIMAZOLE 5 MG: 5 TABLET ORAL at 13:43

## 2023-08-21 RX ADMIN — HYDROMORPHONE HYDROCHLORIDE 0.5 MG: 1 INJECTION, SOLUTION INTRAMUSCULAR; INTRAVENOUS; SUBCUTANEOUS at 17:25

## 2023-08-21 RX ADMIN — HYDROMORPHONE HYDROCHLORIDE 0.5 MG: 1 INJECTION, SOLUTION INTRAMUSCULAR; INTRAVENOUS; SUBCUTANEOUS at 07:11

## 2023-08-21 RX ADMIN — GUAIFENESIN 600 MG: 600 TABLET, EXTENDED RELEASE ORAL at 03:10

## 2023-08-21 RX ADMIN — HYDROMORPHONE HYDROCHLORIDE 0.5 MG: 1 INJECTION, SOLUTION INTRAMUSCULAR; INTRAVENOUS; SUBCUTANEOUS at 12:13

## 2023-08-21 RX ADMIN — ENOXAPARIN SODIUM 40 MG: 100 INJECTION SUBCUTANEOUS at 17:25

## 2023-08-21 NOTE — H&P
[facility] Medicine Services  History & Physical    Patient Name: Justa Coles  : 1960  MRN: [id number]  Primary Care Physician:  Michael Cho MD  Date of admission: 2023    Subjective      Chief Complaint: shortness of breath    History of Present Illness: Justa Coles is a 63 y.o. female who presented to [facility] on 2023 complaining of shortness of breath that has been present over the past 8 days and worsening over the past couple of days.  She was diagnosed with a bronchitis on 2023 at an urgent care and was discharged with 7 days of doxycycline and a Medrol Dosepak.  Her symptoms did not improve so she presented to our emergency department on 2023 where she was diagnosed with a COPD exacerbation and was started on prednisone and discharged home.  Patient states she was feeling better until a couple of days ago.  She is not normally on oxygen but has required supplemental oxygen by nasal cannula since arrival to the ED today.  She denies any chest pain.  She has had some chills today but no documented fever.  She denies nausea, vomiting, diarrhea.  She also states that she is having back pain across her bra line that she believes is from coughing.  She said it only hurts when she tries to take a deep breath, therefore is causing her to breathe shallowly.    ROS   Constitutional:  Positive for chills. Negative for fever.   HENT:  Negative for congestion.    Respiratory:  Positive for cough and shortness of breath.    Cardiovascular:  Negative for chest pain.   Gastrointestinal:  Negative for abdominal pain, diarrhea and vomiting.   Musculoskeletal:  Positive for back pain.   Neurological:  Negative for headaches.   Psychiatric/Behavioral:  Negative for confusion.     Personal History     Past Medical History:   Diagnosis Date    Graves disease     Hyperlipidemia     Hyperthyroidism 2022       Past Surgical History:   Procedure Laterality Date     APPENDECTOMY      BREAST BIOPSY Left 2020    stereo     SECTION  , ,     EXCISION BAKERS CYST KNEE Left     POSTPARTUM TUBAL LIGATION      TONSILLECTOMY      TUBAL ABDOMINAL LIGATION         Family History: family history includes Colon cancer in her father, maternal grandfather, maternal grandmother, and paternal grandfather. Otherwise pertinent FHx was reviewed and not pertinent to current issue.    Social History:  reports that she quit smoking about 12 years ago. Her smoking use included cigarettes. She has a 20.00 pack-year smoking history. She has been exposed to tobacco smoke. She has never used smokeless tobacco. She reports that she does not currently use alcohol. She reports that she does not use drugs.    Home Medications:  Prior to Admission Medications       Prescriptions Last Dose Informant Patient Reported? Taking?    albuterol sulfate  (90 Base) MCG/ACT inhaler   No No    Inhale 2 puffs Every 6 (Six) Hours As Needed for Wheezing or Shortness of Air.    atorvastatin (LIPITOR) 10 MG tablet   No No    Take 1 tablet by mouth Every Night.    Breo Ellipta 200-25 MCG/ACT inhaler   No No    INHALE 1 PUFF BY MOUTH DAILY    budesonide (PULMICORT) 1 MG/2ML nebulizer solution   Yes No    INHALE 1 VIAL USING NEBULIZER TWICE A DAY    levalbuterol (XOPENEX HFA) 45 MCG/ACT inhaler   No No    Inhale 1-2 puffs Every 6 (Six) Hours As Needed for Wheezing or Shortness of Air.    levalbuterol (XOPENEX) 0.63 MG/3ML nebulizer solution   Yes No    INHALE 1 VIAL USING NEBULIZER EVERY FOUR HOURS AS NEEDED FOR DIFFICULTY BREATHING    methIMAzole (TAPAZOLE) 5 MG tablet   Yes No    Take  by mouth. Take one tab 2d/ week  Indications: Overactive Thyroid Gland    methylPREDNISolone (MEDROL) 4 MG dose pack   No No    Take as directed on package instructions.    predniSONE (DELTASONE) 20 MG tablet   No No    Take 1 tablet by mouth 2 (Two) Times a Day for 5 days.    promethazine-codeine (PHENERGAN  with CODEINE) 6.25-10 MG/5ML syrup   No No    Take 5 mL by mouth Every 6 (Six) Hours As Needed for Cough.    propranolol (INDERAL) 20 MG tablet   No No    Take one bid              Allergies:  Allergies   Allergen Reactions    Sulfa Antibiotics Hives       Objective      Vitals:   Temp:  [99.8 øF (37.7 øC)] 99.8 øF (37.7 øC)  Heart Rate:  [] 89  Resp:  [20-40] 20  BP: (107-159)/(59-84) 107/59  Flow (L/min):  [4] 4    Physical Exam   Vitals and nursing note reviewed.   Constitutional:       Appearance: She is ill-appearing.   HENT:      Head: Normocephalic and atraumatic.      Mouth/Throat:      Mouth: Mucous membranes are moist.   Cardiovascular:      Rate and Rhythm: Regular rhythm. Tachycardia present.      Heart sounds: Normal heart sounds.   Pulmonary:      Effort: No respiratory distress     Breath sounds: Decreased breath sounds present.   Abdominal:      General: Bowel sounds are normal.      Palpations: Abdomen is soft.      Tenderness: There is no abdominal tenderness.   Musculoskeletal:      Right lower leg: No tenderness. No edema.      Left lower leg: No tenderness. No edema.   Skin:     General: Skin is warm and dry.   Neurological:      Mental Status: She is alert and oriented to person, place, and time.     Result Review    Result Review:  I have personally reviewed the results from the time of this admission to 8/20/2023 23:41 EDT and agree with these findings:  [x]  Laboratory  [x]  Microbiology  [x]  Radiology  [x]  EKG/Telemetry   []  Cardiology/Vascular   []  Pathology  []  Old records  []  Other:  Most notable findings include: Chest x-ray shows right middle lobe opacity concerning for developing pneumonia.  White blood cell count is 19.8.  Lactic acid is normal.  Somewhat hypoxic with PO2 of 74.5.      Assessment & Plan        Active Hospital Problems:  Active Hospital Problems    Diagnosis     **Pneumonia      Plan:     Developing pneumonia  Shortness of breath  Back pain from  coughing  Chest x-ray shows right middle lobe opacity concerning for developing pneumonia  Respiratory panel negative  Continue current antibiotics  Lactic acid normal  Blood cultures pending  DuoNebs as needed  Supplemental oxygen as needed  Incentive spirometry  Pain control    Leukocytosis  Hyperglycemia  WBC 19.8 up from 8.3 five days ago  A1c 5.7  She has been on steroids for 7 days  No history of diabetes  Will continue to monitor with daily labs    DVT prophylaxis:  No DVT prophylaxis order currently exists.    CODE STATUS:       Admission Status:  I believe this patient meets inpatient status.    Signature: Electronically signed by JEFF Quinn, 08/20/23, 23:41 EDT.  [facility] Hospitalist Team

## 2023-08-21 NOTE — ED PROVIDER NOTES
Subjective   History of Present Illness  Chief complaint: Shortness of breath    63-year-old female presents with shortness of breath.  Patient states symptoms have been present over the past 8 days.  She was diagnosed with bronchitis and has been on steroids and antibiotics.  She states she thought she was feeling better but over the past couple days her symptoms have gotten progressively worse.  EMS gave the patient Solu-Medrol, DuoNeb treatment, albuterol treatment, 2 g of magnesium in route to the hospital.  She presented to the emergency room on 4 L nasal cannula.  She does not normally use supplemental oxygen.  She reports pain across to her back.  She denies any chest pain.  She has had some chills today but no documented fever.  She denies any vomiting or diarrhea.  She was seen here 4 days ago for the symptoms and had a negative work-up at that time including negative D-dimer and normal troponin and BNP.    History provided by:  Patient    Review of Systems   Constitutional:  Positive for chills. Negative for fever.   HENT:  Negative for congestion.    Respiratory:  Positive for cough and shortness of breath.    Cardiovascular:  Negative for chest pain.   Gastrointestinal:  Negative for abdominal pain, diarrhea and vomiting.   Musculoskeletal:  Positive for back pain.   Neurological:  Negative for headaches.   Psychiatric/Behavioral:  Negative for confusion.      Past Medical History:   Diagnosis Date    Graves disease     Hyperlipidemia     Hyperthyroidism 2022       Allergies   Allergen Reactions    Sulfa Antibiotics Hives       Past Surgical History:   Procedure Laterality Date    APPENDECTOMY      BREAST BIOPSY Left 2020    stereo     SECTION  , ,     EXCISION BAKERS CYST KNEE Left     POSTPARTUM TUBAL LIGATION      TONSILLECTOMY      TUBAL ABDOMINAL LIGATION         Family History   Problem Relation Age of Onset    Colon cancer Father     Colon cancer Maternal  "Grandmother     Colon cancer Maternal Grandfather     Colon cancer Paternal Grandfather        Social History     Socioeconomic History    Marital status:    Tobacco Use    Smoking status: Former     Packs/day: 1.00     Years: 20.00     Pack years: 20.00     Types: Cigarettes     Quit date: 2011     Years since quittin.2     Passive exposure: Current    Smokeless tobacco: Never   Vaping Use    Vaping Use: Never used   Substance and Sexual Activity    Alcohol use: Not Currently    Drug use: Never    Sexual activity: Defer       /61   Pulse (!) 121   Temp 99.8 øF (37.7 øC) (Oral)   Resp 22   Ht 165.1 cm (65\")   Wt 39 kg (85 lb 15.7 oz)   SpO2 91%   BMI 14.31 kg/mý       Objective   Physical Exam  Vitals and nursing note reviewed.   Constitutional:       Appearance: She is ill-appearing.   HENT:      Head: Normocephalic and atraumatic.      Mouth/Throat:      Mouth: Mucous membranes are moist.   Cardiovascular:      Rate and Rhythm: Regular rhythm. Tachycardia present.      Heart sounds: Normal heart sounds.   Pulmonary:      Effort: Tachypnea, accessory muscle usage and respiratory distress present.      Breath sounds: Decreased breath sounds present.   Abdominal:      General: Bowel sounds are normal.      Palpations: Abdomen is soft.      Tenderness: There is no abdominal tenderness.   Musculoskeletal:      Right lower leg: No tenderness. No edema.      Left lower leg: No tenderness. No edema.   Skin:     General: Skin is warm and dry.   Neurological:      Mental Status: She is alert and oriented to person, place, and time.       Procedures           ED Course      My interpretation of EKG shows sinus tachycardia, rate of 114, LVH with secondary repolarization abnormality, no ST elevation     Results for orders placed or performed during the hospital encounter of 23   Respiratory Panel PCR w/COVID-19(SARS-CoV-2) [facility] In-House, NP Swab in UTM/VTM, 3-4 HR TAT - " Swab, Nasopharynx    Specimen: Nasopharynx; Swab   Result Value Ref Range    ADENOVIRUS, PCR Not Detected Not Detected    Coronavirus 229E Not Detected Not Detected    Coronavirus HKU1 Not Detected Not Detected    Coronavirus NL63 Not Detected Not Detected    Coronavirus OC43 Not Detected Not Detected    COVID19 Not Detected Not Detected - Ref. Range    Human Metapneumovirus Not Detected Not Detected    Human Rhinovirus/Enterovirus Not Detected Not Detected    Influenza A PCR Not Detected Not Detected    Influenza B PCR Not Detected Not Detected    Parainfluenza Virus 1 Not Detected Not Detected    Parainfluenza Virus 2 Not Detected Not Detected    Parainfluenza Virus 3 Not Detected Not Detected    Parainfluenza Virus 4 Not Detected Not Detected    RSV, PCR Not Detected Not Detected    Bordetella pertussis pcr Not Detected Not Detected    Bordetella parapertussis PCR Not Detected Not Detected    Chlamydophila pneumoniae PCR Not Detected Not Detected    Mycoplasma pneumo by PCR Not Detected Not Detected   Comprehensive Metabolic Panel    Specimen: Blood   Result Value Ref Range    Glucose 156 (H) 65 - 99 mg/dL    BUN 22 8 - 23 mg/dL    Creatinine 0.42 (L) 0.57 - 1.00 mg/dL    Sodium 142 136 - 145 mmol/L    Potassium 4.3 3.5 - 5.2 mmol/L    Chloride 105 98 - 107 mmol/L    CO2 27.0 22.0 - 29.0 mmol/L    Calcium 8.4 (L) 8.6 - 10.5 mg/dL    Total Protein 6.1 6.0 - 8.5 g/dL    Albumin 3.6 3.5 - 5.2 g/dL    ALT (SGPT) 22 1 - 33 U/L    AST (SGOT) 17 1 - 32 U/L    Alkaline Phosphatase 80 39 - 117 U/L    Total Bilirubin 0.5 0.0 - 1.2 mg/dL    Globulin 2.5 gm/dL    A/G Ratio 1.4 g/dL    BUN/Creatinine Ratio 52.4 (H) 7.0 - 25.0    Anion Gap 10.0 5.0 - 15.0 mmol/L    eGFR 109.4 >60.0 mL/min/1.73   CBC Auto Differential    Specimen: Blood   Result Value Ref Range    WBC 19.80 (H) 3.40 - 10.80 10*3/mm3    RBC 4.90 3.77 - 5.28 10*6/mm3    Hemoglobin 14.4 12.0 - 15.9 g/dL    Hematocrit 44.8 34.0 - 46.6 %    MCV 91.5 79.0 - 97.0 fL     MCH 29.5 26.6 - 33.0 pg    MCHC 32.2 31.5 - 35.7 g/dL    RDW 15.3 12.3 - 15.4 %    RDW-SD 48.6 37.0 - 54.0 fl    MPV 8.1 6.0 - 12.0 fL    Platelets 442 140 - 450 10*3/mm3   Scan Slide    Specimen: Blood   Result Value Ref Range    Scan Slide     Blood Gas, Arterial -    Specimen: Arterial Blood   Result Value Ref Range    Site Right Brachial     Flo's Test Positive     pH, Arterial 7.429 7.350 - 7.450 pH units    pCO2, Arterial 38.4 35.0 - 48.0 mm Hg    pO2, Arterial 74.5 (L) 83.0 - 108.0 mm Hg    HCO3, Arterial 25.4 21.0 - 28.0 mmol/L    Base Excess, Arterial 1.2 0.0 - 3.0 mmol/L    O2 Saturation, Arterial 95.2 94.0 - 98.0 %    CO2 Content 26.6 22 - 29 mmol/L    Barometric Pressure for Blood Gas      Modality Cannula     FIO2 36 %    Hemodilution No    Manual Differential    Specimen: Blood   Result Value Ref Range    Neutrophil % 77.0 (H) 42.7 - 76.0 %    Lymphocyte % 5.0 (L) 19.6 - 45.3 %    Monocyte % 11.0 5.0 - 12.0 %    Bands %  6.0 (H) 0.0 - 5.0 %    Atypical Lymphocyte % 1.0 0.0 - 5.0 %    Neutrophils Absolute 16.43 (H) 1.70 - 7.00 10*3/mm3    Lymphocytes Absolute 1.19 0.70 - 3.10 10*3/mm3    Monocytes Absolute 2.18 (H) 0.10 - 0.90 10*3/mm3    RBC Morphology Normal Normal    WBC Morphology Normal Normal    Large Platelets Slight/1+ None Seen   POC Lactate    Specimen: Blood   Result Value Ref Range    Lactate 1.9 0.3 - 2.0 mmol/L   ECG 12 Lead Dyspnea   Result Value Ref Range    QT Interval 302 ms   Green Top (Gel)   Result Value Ref Range    Extra Tube Hold for add-ons.    Gold Top - SST   Result Value Ref Range    Extra Tube Hold for add-ons.    Light Blue Top   Result Value Ref Range    Extra Tube Hold for add-ons.      XR Chest 1 View    Result Date: 8/20/2023  Impression: Interval development of mild patchy airspace disease in the medial right lower lobe which could be from atelectasis or developing pneumonia. There are stable interstitial and emphysematous changes. Electronically Signed: Nilesh  MD Tiki  8/20/2023 9:37 PM EDT  Workstation ID: [id number]                                   Medical Decision Making  Amount and/or Complexity of Data Reviewed  Labs: ordered.  Radiology: ordered.  ECG/medicine tests: ordered.    Risk  Prescription drug management.      Patient had the above evaluation.  Results were discussed with the patient.  My interpretation chest x-ray shows right middle lobe opacity concerning for developing pneumonia.  White blood cell count is 19.8.  CMP is unremarkable.  Lactic acid is normal.  Patient is somewhat hypoxic.  She is on 4 L nasal cannula and ABG shows pH 7.42, PCO2 38.4, PO2 74.5.  Patient was started on IV antibiotics.  Blood cultures were obtained.  I discussed with the nurse practitioner on-call for the hospitalist and the patient will be admitted for further evaluation and management.      Final diagnoses:   Sepsis, due to unspecified organism, unspecified whether acute organ dysfunction present   Pneumonia due to infectious organism, unspecified laterality, unspecified part of lung   Hypoxia       ED Disposition  ED Disposition       ED Disposition   Decision to Admit    Condition   --    Comment   Level of Care: Telemetry [5]   Admitting Physician: SAVANNAH PINEDA [[id number]]   Attending Physician: SAVANNAH PINEDA [[id number]]                 No follow-up provider specified.       Medication List      No changes were made to your prescriptions during this visit.            Kael Rice MD  08/20/23 [id number]

## 2023-08-21 NOTE — PAYOR COMM NOTE
"  INPATIENT HOSPITAL MEDICAL AUTH REQUEST  - [id number]     OBS TO INPATIENT STAY -   OBSERVATION 8/20/23  @23:37, MADE INPATIENT 8/21 @ 09:23.  Please advise if additional clinical is needed to process this request.  Thank you!    Lashell Ramírez  Utilization Review Coordinator  [facility]  [address] [place]  [address]  Ph: [phone]  Fx: [phone]      [id number] [phone]     Huseyin Coles \"Huseyin Coles\" (63 y.o. Female)       Date of Birth   1960    Social Security Number       Address   [address] [place] [address]    Home Phone   [phone]    MRN   [id number]       [employer]   Congregation    Marital Status                               Admission Date   8/20/23    Admission Type   Emergency    Admitting Provider   Young Carr MD    Attending Provider   Paul Mayers MD    Department, Room/Bed   [facility] EMERGENCY DEPARTMENT, 06/06       Discharge Date       Discharge Disposition       Discharge Destination                                 Attending Provider: Paul Mayers MD    Allergies: Sulfa Antibiotics    Isolation: None   Infection: None   Code Status: CPR    Ht: 165.1 cm (65\")   Wt: 38.5 kg (84 lb 14 oz)    Admission Cmt: None   Principal Problem: Pneumonia [J18.9]                   Active Insurance as of 8/20/2023       Primary Coverage       Payor Plan Insurance Group Employer/Plan Group    ANTH I-DISPO [facility] PPO [id number]       Payor Plan Address Payor Plan Phone Number Payor Plan Fax Number Effective Dates    [id number] [phone]  10/1/2018 - None Entered    [facility] [id number]         Subscriber Name Subscriber Birth Date Member ID       HUSEYIN COLES 1960 [id number]                     Emergency Contacts        (Rel.) Home Phone Work Phone Mobile Phone    STODDARD,GLENYS (Son) -- -- [phone]    PRISCILLA STODDARD (Relative) -- -- --                 History & " Physical        America Guzman APRN at 23 2340       Attestation signed by Young Carr MD at 23 4017    I have reviewed this documentation and agree.                      [facility] Medicine Services  History & Physical    Patient Name: Justa Coles  : 1960  MRN: [id number]  Primary Care Physician:  Michael Cho MD  Date of admission: 2023    Subjective      Chief Complaint: shortness of breath    History of Present Illness: Justa Coles is a 63 y.o. female who presented to [facility] on 2023 complaining of shortness of breath that has been present over the past 8 days and worsening over the past couple of days.  She was diagnosed with a bronchitis on 2023 at an urgent care and was discharged with 7 days of doxycycline and a Medrol Dosepak.  Her symptoms did not improve so she presented to our emergency department on 2023 where she was diagnosed with a COPD exacerbation and was started on prednisone and discharged home.  Patient states she was feeling better until a couple of days ago.  She is not normally on oxygen but has required supplemental oxygen by nasal cannula since arrival to the ED today.  She denies any chest pain.  She has had some chills today but no documented fever.  She denies nausea, vomiting, diarrhea.  She also states that she is having back pain across her bra line that she believes is from coughing.  She said it only hurts when she tries to take a deep breath, therefore is causing her to breathe shallowly.    ROS   Constitutional:  Positive for chills. Negative for fever.   HENT:  Negative for congestion.    Respiratory:  Positive for cough and shortness of breath.    Cardiovascular:  Negative for chest pain.   Gastrointestinal:  Negative for abdominal pain, diarrhea and vomiting.   Musculoskeletal:  Positive for back pain.   Neurological:  Negative for headaches.   Psychiatric/Behavioral:  Negative for confusion.      Personal History     Past Medical History:   Diagnosis Date    Graves disease     Hyperlipidemia     Hyperthyroidism 2022       Past Surgical History:   Procedure Laterality Date    APPENDECTOMY      BREAST BIOPSY Left 2020    stereo     SECTION  , ,     EXCISION BAKERS CYST KNEE Left     POSTPARTUM TUBAL LIGATION  1993    TONSILLECTOMY      TUBAL ABDOMINAL LIGATION         Family History: family history includes Colon cancer in her father, maternal grandfather, maternal grandmother, and paternal grandfather. Otherwise pertinent FHx was reviewed and not pertinent to current issue.    Social History:  reports that she quit smoking about 12 years ago. Her smoking use included cigarettes. She has a 20.00 pack-year smoking history. She has been exposed to tobacco smoke. She has never used smokeless tobacco. She reports that she does not currently use alcohol. She reports that she does not use drugs.    Home Medications:  Prior to Admission Medications       Prescriptions Last Dose Informant Patient Reported? Taking?    albuterol sulfate  (90 Base) MCG/ACT inhaler   No No    Inhale 2 puffs Every 6 (Six) Hours As Needed for Wheezing or Shortness of Air.    atorvastatin (LIPITOR) 10 MG tablet   No No    Take 1 tablet by mouth Every Night.    Breo Ellipta 200-25 MCG/ACT inhaler   No No    INHALE 1 PUFF BY MOUTH DAILY    budesonide (PULMICORT) 1 MG/2ML nebulizer solution   Yes No    INHALE 1 VIAL USING NEBULIZER TWICE A DAY    levalbuterol (XOPENEX HFA) 45 MCG/ACT inhaler   No No    Inhale 1-2 puffs Every 6 (Six) Hours As Needed for Wheezing or Shortness of Air.    levalbuterol (XOPENEX) 0.63 MG/3ML nebulizer solution   Yes No    INHALE 1 VIAL USING NEBULIZER EVERY FOUR HOURS AS NEEDED FOR DIFFICULTY BREATHING    methIMAzole (TAPAZOLE) 5 MG tablet   Yes No    Take  by mouth. Take one tab 2d/ week  Indications: Overactive Thyroid Gland    methylPREDNISolone (MEDROL) 4 MG dose pack   No  No    Take as directed on package instructions.    predniSONE (DELTASONE) 20 MG tablet   No No    Take 1 tablet by mouth 2 (Two) Times a Day for 5 days.    promethazine-codeine (PHENERGAN with CODEINE) 6.25-10 MG/5ML syrup   No No    Take 5 mL by mouth Every 6 (Six) Hours As Needed for Cough.    propranolol (INDERAL) 20 MG tablet   No No    Take one bid          Allergies:  Allergies   Allergen Reactions    Sulfa Antibiotics Hives       Objective      Vitals:   Temp:  [99.8 øF (37.7 øC)] 99.8 øF (37.7 øC)  Heart Rate:  [] 89  Resp:  [20-40] 20  BP: (107-159)/(59-84) 107/59  Flow (L/min):  [4] 4    Physical Exam   Vitals and nursing note reviewed.   Constitutional:       Appearance: She is ill-appearing.   HENT:      Head: Normocephalic and atraumatic.      Mouth/Throat:      Mouth: Mucous membranes are moist.   Cardiovascular:      Rate and Rhythm: Regular rhythm. Tachycardia present.      Heart sounds: Normal heart sounds.   Pulmonary:      Effort: No respiratory distress     Breath sounds: Decreased breath sounds present.   Abdominal:      General: Bowel sounds are normal.      Palpations: Abdomen is soft.      Tenderness: There is no abdominal tenderness.   Musculoskeletal:      Right lower leg: No tenderness. No edema.      Left lower leg: No tenderness. No edema.   Skin:     General: Skin is warm and dry.   Neurological:      Mental Status: She is alert and oriented to person, place, and time.     Result Review    Result Review:  I have personally reviewed the results from the time of this admission to 8/20/2023 23:41 EDT and agree with these findings:  [x]  Laboratory  [x]  Microbiology  [x]  Radiology  [x]  EKG/Telemetry   []  Cardiology/Vascular   []  Pathology  []  Old records  []  Other:  Most notable findings include: Chest x-ray shows right middle lobe opacity concerning for developing pneumonia.  White blood cell count is 19.8.  Lactic acid is normal.  Somewhat hypoxic with PO2 of  74.5.      Assessment & Plan        Active Hospital Problems:  Active Hospital Problems    Diagnosis     **Pneumonia      Plan:     Developing pneumonia  Shortness of breath  Back pain from coughing  Chest x-ray shows right middle lobe opacity concerning for developing pneumonia  Respiratory panel negative  Continue current antibiotics  Lactic acid normal  Blood cultures pending  DuoNebs as needed  Supplemental oxygen as needed  Incentive spirometry  Pain control    Leukocytosis  Hyperglycemia  WBC 19.8 up from 8.3 five days ago  A1c 5.7  She has been on steroids for 7 days  No history of diabetes  Will continue to monitor with daily labs    DVT prophylaxis:  No DVT prophylaxis order currently exists.    CODE STATUS:       Admission Status:  I believe this patient meets inpatient status.    Signature: Electronically signed by JEFF Quinn, 08/20/23, 23:41 EDT.  [facility] Hospitalist Team    Electronically signed by Young Carr MD at 08/21/23 0409          Emergency Department Notes        Kael Rice MD at 08/20/23 2111          Subjective   History of Present Illness  Chief complaint: Shortness of breath    63-year-old female presents with shortness of breath.  Patient states symptoms have been present over the past 8 days.  She was diagnosed with bronchitis and has been on steroids and antibiotics.  She states she thought she was feeling better but over the past couple days her symptoms have gotten progressively worse.  EMS gave the patient Solu-Medrol, DuoNeb treatment, albuterol treatment, 2 g of magnesium in route to the hospital.  She presented to the emergency room on 4 L nasal cannula.  She does not normally use supplemental oxygen.  She reports pain across to her back.  She denies any chest pain.  She has had some chills today but no documented fever.  She denies any vomiting or diarrhea.  She was seen here 4 days ago for the symptoms and had a negative work-up at that time including  "negative D-dimer and normal troponin and BNP.    History provided by:  Patient    Review of Systems   Constitutional:  Positive for chills. Negative for fever.   HENT:  Negative for congestion.    Respiratory:  Positive for cough and shortness of breath.    Cardiovascular:  Negative for chest pain.   Gastrointestinal:  Negative for abdominal pain, diarrhea and vomiting.   Musculoskeletal:  Positive for back pain.   Neurological:  Negative for headaches.   Psychiatric/Behavioral:  Negative for confusion.      Past Medical History:   Diagnosis Date    Graves disease     Hyperlipidemia     Hyperthyroidism 2022       Allergies   Allergen Reactions    Sulfa Antibiotics Hives       Past Surgical History:   Procedure Laterality Date    APPENDECTOMY      BREAST BIOPSY Left 2020    stereo     SECTION  , ,     EXCISION BAKERS CYST KNEE Left     POSTPARTUM TUBAL LIGATION      TONSILLECTOMY      TUBAL ABDOMINAL LIGATION         Family History   Problem Relation Age of Onset    Colon cancer Father     Colon cancer Maternal Grandmother     Colon cancer Maternal Grandfather     Colon cancer Paternal Grandfather        Social History     Socioeconomic History    Marital status:    Tobacco Use    Smoking status: Former     Packs/day: 1.00     Years: 20.00     Pack years: 20.00     Types: Cigarettes     Quit date: 2011     Years since quittin.2     Passive exposure: Current    Smokeless tobacco: Never   Vaping Use    Vaping Use: Never used   Substance and Sexual Activity    Alcohol use: Not Currently    Drug use: Never    Sexual activity: Defer       /61   Pulse (!) 121   Temp 99.8 øF (37.7 øC) (Oral)   Resp 22   Ht 165.1 cm (65\")   Wt 39 kg (85 lb 15.7 oz)   SpO2 91%   BMI 14.31 kg/mý       Objective   Physical Exam  Vitals and nursing note reviewed.   Constitutional:       Appearance: She is ill-appearing.   HENT:      Head: Normocephalic and atraumatic.      " Mouth/Throat:      Mouth: Mucous membranes are moist.   Cardiovascular:      Rate and Rhythm: Regular rhythm. Tachycardia present.      Heart sounds: Normal heart sounds.   Pulmonary:      Effort: Tachypnea, accessory muscle usage and respiratory distress present.      Breath sounds: Decreased breath sounds present.   Abdominal:      General: Bowel sounds are normal.      Palpations: Abdomen is soft.      Tenderness: There is no abdominal tenderness.   Musculoskeletal:      Right lower leg: No tenderness. No edema.      Left lower leg: No tenderness. No edema.   Skin:     General: Skin is warm and dry.   Neurological:      Mental Status: She is alert and oriented to person, place, and time.       Procedures  ED Course      My interpretation of EKG shows sinus tachycardia, rate of 114, LVH with secondary repolarization abnormality, no ST elevation     Results for orders placed or performed during the hospital encounter of 08/20/23   Respiratory Panel PCR w/COVID-19(SARS-CoV-2) LAURA/AARTI/ITA/PAD/COR/MAD/IRMA In-House, NP Swab in UTM/VTM, 3-4 HR TAT - Swab, Nasopharynx    Specimen: Nasopharynx; Swab   Result Value Ref Range    ADENOVIRUS, PCR Not Detected Not Detected    Coronavirus 229E Not Detected Not Detected    Coronavirus HKU1 Not Detected Not Detected    Coronavirus NL63 Not Detected Not Detected    Coronavirus OC43 Not Detected Not Detected    COVID19 Not Detected Not Detected - Ref. Range    Human Metapneumovirus Not Detected Not Detected    Human Rhinovirus/Enterovirus Not Detected Not Detected    Influenza A PCR Not Detected Not Detected    Influenza B PCR Not Detected Not Detected    Parainfluenza Virus 1 Not Detected Not Detected    Parainfluenza Virus 2 Not Detected Not Detected    Parainfluenza Virus 3 Not Detected Not Detected    Parainfluenza Virus 4 Not Detected Not Detected    RSV, PCR Not Detected Not Detected    Bordetella pertussis pcr Not Detected Not Detected    Bordetella parapertussis PCR Not  Detected Not Detected    Chlamydophila pneumoniae PCR Not Detected Not Detected    Mycoplasma pneumo by PCR Not Detected Not Detected   Comprehensive Metabolic Panel    Specimen: Blood   Result Value Ref Range    Glucose 156 (H) 65 - 99 mg/dL    BUN 22 8 - 23 mg/dL    Creatinine 0.42 (L) 0.57 - 1.00 mg/dL    Sodium 142 136 - 145 mmol/L    Potassium 4.3 3.5 - 5.2 mmol/L    Chloride 105 98 - 107 mmol/L    CO2 27.0 22.0 - 29.0 mmol/L    Calcium 8.4 (L) 8.6 - 10.5 mg/dL    Total Protein 6.1 6.0 - 8.5 g/dL    Albumin 3.6 3.5 - 5.2 g/dL    ALT (SGPT) 22 1 - 33 U/L    AST (SGOT) 17 1 - 32 U/L    Alkaline Phosphatase 80 39 - 117 U/L    Total Bilirubin 0.5 0.0 - 1.2 mg/dL    Globulin 2.5 gm/dL    A/G Ratio 1.4 g/dL    BUN/Creatinine Ratio 52.4 (H) 7.0 - 25.0    Anion Gap 10.0 5.0 - 15.0 mmol/L    eGFR 109.4 >60.0 mL/min/1.73   CBC Auto Differential    Specimen: Blood   Result Value Ref Range    WBC 19.80 (H) 3.40 - 10.80 10*3/mm3    RBC 4.90 3.77 - 5.28 10*6/mm3    Hemoglobin 14.4 12.0 - 15.9 g/dL    Hematocrit 44.8 34.0 - 46.6 %    MCV 91.5 79.0 - 97.0 fL    MCH 29.5 26.6 - 33.0 pg    MCHC 32.2 31.5 - 35.7 g/dL    RDW 15.3 12.3 - 15.4 %    RDW-SD 48.6 37.0 - 54.0 fl    MPV 8.1 6.0 - 12.0 fL    Platelets 442 140 - 450 10*3/mm3   Scan Slide    Specimen: Blood   Result Value Ref Range    Scan Slide     Blood Gas, Arterial -    Specimen: Arterial Blood   Result Value Ref Range    Site Right Brachial     Flo's Test Positive     pH, Arterial 7.429 7.350 - 7.450 pH units    pCO2, Arterial 38.4 35.0 - 48.0 mm Hg    pO2, Arterial 74.5 (L) 83.0 - 108.0 mm Hg    HCO3, Arterial 25.4 21.0 - 28.0 mmol/L    Base Excess, Arterial 1.2 0.0 - 3.0 mmol/L    O2 Saturation, Arterial 95.2 94.0 - 98.0 %    CO2 Content 26.6 22 - 29 mmol/L    Barometric Pressure for Blood Gas      Modality Cannula     FIO2 36 %    Hemodilution No    Manual Differential    Specimen: Blood   Result Value Ref Range    Neutrophil % 77.0 (H) 42.7 - 76.0 %    Lymphocyte  % 5.0 (L) 19.6 - 45.3 %    Monocyte % 11.0 5.0 - 12.0 %    Bands %  6.0 (H) 0.0 - 5.0 %    Atypical Lymphocyte % 1.0 0.0 - 5.0 %    Neutrophils Absolute 16.43 (H) 1.70 - 7.00 10*3/mm3    Lymphocytes Absolute 1.19 0.70 - 3.10 10*3/mm3    Monocytes Absolute 2.18 (H) 0.10 - 0.90 10*3/mm3    RBC Morphology Normal Normal    WBC Morphology Normal Normal    Large Platelets Slight/1+ None Seen   POC Lactate    Specimen: Blood   Result Value Ref Range    Lactate 1.9 0.3 - 2.0 mmol/L   ECG 12 Lead Dyspnea   Result Value Ref Range    QT Interval 302 ms   Green Top (Gel)   Result Value Ref Range    Extra Tube Hold for add-ons.    Gold Top - SST   Result Value Ref Range    Extra Tube Hold for add-ons.    Light Blue Top   Result Value Ref Range    Extra Tube Hold for add-ons.      XR Chest 1 View    Result Date: 8/20/2023  Impression: Interval development of mild patchy airspace disease in the medial right lower lobe which could be from atelectasis or developing pneumonia. There are stable interstitial and emphysematous changes. Electronically Signed: Nilesh Fairbanks MD  8/20/2023 9:37 PM EDT  Workstation ID: [id number]        Medical Decision Making  Amount and/or Complexity of Data Reviewed  Labs: ordered.  Radiology: ordered.  ECG/medicine tests: ordered.    Risk  Prescription drug management.      Patient had the above evaluation.  Results were discussed with the patient.  My interpretation chest x-ray shows right middle lobe opacity concerning for developing pneumonia.  White blood cell count is 19.8.  CMP is unremarkable.  Lactic acid is normal.  Patient is somewhat hypoxic.  She is on 4 L nasal cannula and ABG shows pH 7.42, PCO2 38.4, PO2 74.5.  Patient was started on IV antibiotics.  Blood cultures were obtained.  I discussed with the nurse practitioner on-call for the hospitalist and the patient will be admitted for further evaluation and management.      Final diagnoses:   Sepsis, due to unspecified organism, unspecified  whether acute organ dysfunction present   Pneumonia due to infectious organism, unspecified laterality, unspecified part of lung   Hypoxia       ED Disposition  ED Disposition       ED Disposition   Decision to Admit    Condition   --    Comment   Level of Care: Telemetry [5]   Admitting Physician: SAVANNAH PINEDA [[id number]]   Attending Physician: SAVANNAH PINEDA [[id number]]                 No follow-up provider specified.       Medication List      No changes were made to your prescriptions during this visit.         Kael Rice MD  23      Electronically signed by Kael Rice MD at 23       Niecy Zhu RN at 23          En route pt received 125 mg Solumedrol, 1 Duoneb, 1 Albuterol, and 2g Mag, 400 ml NS from EMS.     Electronically signed by Niecy Zhu RN at 23       Operative/Procedure Notes (last 24 hours)  Notes from 23 1128 through 23 1128   No notes of this type exist for this encounter.          Physician Progress Notes (last 24 hours)        Rand Bledsoe PA-C at 23 0945              [facility] Medicine Services   Daily Progress Note    Patient Name: Justa Coles  : 1960  MRN: [id number]  Primary Care Physician:  Michael Cho MD  Date of admission: 2023      Subjective      Chief Complaint: SOA    History of Present Illness: Justa Coles is a 63 y.o. female who presented to [facility] on 2023 complaining of shortness of breath that has been present over the past 8 days and worsening over the past couple of days.  She was diagnosed with a bronchitis on 2023 at an urgent care and was discharged with 7 days of doxycycline and a Medrol Dosepak.  Her symptoms did not improve so she presented to our emergency department on 2023 where she was diagnosed with a COPD exacerbation and was started on prednisone and discharged home.  Patient states she was feeling better until a  couple of days ago.  She is not normally on oxygen but has required supplemental oxygen by nasal cannula since arrival to the ED today.  She denies any chest pain.  She has had some chills today but no documented fever.  She denies nausea, vomiting, diarrhea.  She also states that she is having back pain across her bra line that she believes is from coughing.  She said it only hurts when she tries to take a deep breath, therefore is causing her to breathe shallowly.     Progress Note 8/21/23:  Patient reports she feels a bit better today. She is still having pain in her upper back with cough. She is still requiring 4LNC, she is not on home O2. She denies any cough or fever. She denies any N/V/C/D. She denies any new symptoms. Will continue with ABX and O2 as needed.     ROS 12 point ROS reviewed and negative except as mentioned above       Objective      Vitals:   Temp:  [97.5 øF (36.4 øC)-99.8 øF (37.7 øC)] 97.5 øF (36.4 øC)  Heart Rate:  [] 85  Resp:  [15-40] 16  BP: (107-159)/(57-84) 117/57  Flow (L/min):  [4] 4    Physical Exam  Constitutional:       General: She is not in acute distress.     Appearance: She is ill-appearing.   HENT:      Head: Normocephalic and atraumatic.   Cardiovascular:      Rate and Rhythm: Normal rate and regular rhythm.      Pulses: Normal pulses.      Heart sounds: No murmur heard.  Pulmonary:      Effort: Pulmonary effort is normal. No respiratory distress.      Breath sounds: Rales present.   Abdominal:      General: Bowel sounds are normal.      Palpations: Abdomen is soft.      Tenderness: There is no abdominal tenderness.   Musculoskeletal:         General: Normal range of motion.      Cervical back: Normal range of motion and neck supple.      Right lower leg: No edema.      Left lower leg: No edema.   Skin:     General: Skin is warm and dry.   Neurological:      General: No focal deficit present.      Mental Status: She is alert and oriented to person, place, and time.    Psychiatric:         Mood and Affect: Mood normal.         Behavior: Behavior normal.      Result Review    Result Review:  I have personally reviewed the results from the time of this admission to 8/21/2023 09:45 EDT and agree with these findings:  [x]  Laboratory  []  Microbiology  [x]  Radiology  [x]  EKG/Telemetry   [x]  Cardiology/Vascular   []  Pathology  []  Old records  []  Other:    Assessment & Plan      Brief Patient Summary:  Justa Coles is a 63 y.o. female who presented to [facility] on 8/20/2023 complaining of shortness of breath that has been present over the past 8 days and worsening over the past couple of days.  She was diagnosed with a bronchitis on 8/11/2023 at an urgent care and was discharged with 7 days of doxycycline and a Medrol Dosepak.  Her symptoms did not improve so she presented to our emergency department on 8/16/2023 where she was diagnosed with a COPD exacerbation and was started on prednisone and discharged home.  Patient states she was feeling better until a couple of days ago.  She is not normally on oxygen but has required supplemental oxygen by nasal cannula since arrival to the ED today.  She denies any chest pain.  She has had some chills today but no documented fever.  She denies nausea, vomiting, diarrhea.  She also states that she is having back pain across her bra line that she believes is from coughing.  She said it only hurts when she tries to take a deep breath, therefore is causing her to breathe shallowly.      azithromycin, 500 mg, Intravenous, Q24H  cefTRIAXone, 2,000 mg, Intravenous, Q24H  enoxaparin, 40 mg, Subcutaneous, Q24H  methIMAzole, 5 mg, Oral, Once per day on Mon Thu  senna-docusate sodium, 2 tablet, Oral, BID  Active Hospital Problems:  Active Hospital Problems    Diagnosis     **Pneumonia      Plan:     Pneumonia  Shortness of breath  Back pain from coughing  Chest x-ray shows right middle lobe opacity concerning for developing  pneumonia  Respiratory panel negative  Continue current antibiotics, ceftriaxone.   Lactic acid normal  Blood cultures pending  DuoNebs as needed  Supplemental oxygen as needed, still requiring 4L NC   Incentive spirometry  Pain control       Leukocytosis  Hyperglycemia  WBC 19.8 up from 8.3 five days ago, WBC 14.80   Glucose 188  A1c 5.7  She has been on steroids for 7 days  No history of diabetes  Will continue to monitor with daily labs       DVT prophylaxis:  Medical DVT prophylaxis orders are present.    CODE STATUS:    Code Status (Patient has no pulse and is not breathing): CPR (Attempt to Resuscitate)  Medical Interventions (Patient has pulse or is breathing): Full Support    Disposition:  I expect patient to be discharged 24-48 hours    Electronically signed by Rand Bledsoe PA-C, 08/21/23, 09:45 EDT.  [facility] Hospitalist Team    Electronically signed by Rand Bledsoe PA-C at 08/21/23 1995

## 2023-08-21 NOTE — CASE MANAGEMENT/SOCIAL WORK
Discharge Planning Assessment  [facility]     Patient Name: Justa Coles  MRN: [id number]  Today's Date: 8/21/2023    Admit Date: 8/20/2023    Plan: Home with family, watch for Oxygen needs   Discharge Needs Assessment       Row Name 08/21/23 1625       Living Environment    People in Home alone    Current Living Arrangements home    Potentially Unsafe Housing Conditions none    Primary Care Provided by self    Provides Primary Care For no one    Able to Return to Prior Arrangements yes       Resource/Environmental Concerns    Resource/Environmental Concerns none    Transportation Concerns none       Food Insecurity    Within the past 12 months, you worried that your food would run out before you got the money to buy more. Never true    Within the past 12 months, the food you bought just didn't last and you didn't have money to get more. Never true       Transition Planning    Patient/Family Anticipates Transition to home with family    Patient/Family Anticipated Services at Transition none    Transportation Anticipated car, drives self;family or friend will provide       Discharge Needs Assessment    Readmission Within the Last 30 Days no previous admission in last 30 days    Equipment Currently Used at Home pulse ox    Concerns to be Addressed denies needs/concerns at this time    Anticipated Changes Related to Illness none    Equipment Needed After Discharge none                   Discharge Plan       Row Name 08/21/23 1625       Plan    Plan Home with family, watch for Oxygen needs    Plan Comments Met with Patient at bedside Lives at home with daughter but is very Independent and works full time. Daughter can drive home at discharge. PCP and Pharmacy verified, able to afford medications. d/c barriers: Oxygen, IV ATB                  Continued Care and Services - Admitted Since 8/20/2023    Coordination has not been started for this encounter.       Expected Discharge Date and Time       Expected Discharge Date  Expected Discharge Time    Aug 22, 2023            Demographic Summary       Row Name 08/21/23 1625       General Information    Admission Type inpatient    Arrived From emergency department    Referral Source admission list    Preferred Language English                   Functional Status       Row Name 08/21/23 1625       Functional Status    Usual Activity Tolerance good    Current Activity Tolerance good       Functional Status, IADL    Medications independent    Meal Preparation independent    Housekeeping independent    Laundry independent    Shopping independent       Mental Status    General Appearance WDL WDL       Mental Status Summary    Recent Changes in Mental Status/Cognitive Functioning no changes       Employment/    Employment Status employed full-time                          Janet Escamilla RN

## 2023-08-21 NOTE — PROGRESS NOTES
[facility] Medicine Services   Daily Progress Note    Patient Name: Justa Coles  : 1960  MRN: [id number]  Primary Care Physician:  Michael Cho MD  Date of admission: 2023        Subjective      Chief Complaint: SOA    History of Present Illness: Justa Coles is a 63 y.o. female who presented to [facility] on 2023 complaining of shortness of breath that has been present over the past 8 days and worsening over the past couple of days.  She was diagnosed with a bronchitis on 2023 at an urgent care and was discharged with 7 days of doxycycline and a Medrol Dosepak.  Her symptoms did not improve so she presented to our emergency department on 2023 where she was diagnosed with a COPD exacerbation and was started on prednisone and discharged home.  Patient states she was feeling better until a couple of days ago.  She is not normally on oxygen but has required supplemental oxygen by nasal cannula since arrival to the ED today.  She denies any chest pain.  She has had some chills today but no documented fever.  She denies nausea, vomiting, diarrhea.  She also states that she is having back pain across her bra line that she believes is from coughing.  She said it only hurts when she tries to take a deep breath, therefore is causing her to breathe shallowly.     Progress Note 23:  Patient reports she feels a bit better today. She is still having pain in her upper back with cough. She is still requiring 4LNC, she is not on home O2. She denies any cough or fever. She denies any N/V/C/D. She denies any new symptoms. Will continue with ABX and O2 as needed.     ROS 12 point ROS reviewed and negative except as mentioned above       Objective      Vitals:   Temp:  [97.5 øF (36.4 øC)-99.8 øF (37.7 øC)] 97.5 øF (36.4 øC)  Heart Rate:  [] 85  Resp:  [15-40] 16  BP: (107-159)/(57-84) 117/57  Flow (L/min):  [4] 4    Physical Exam  Constitutional:       General: She  is not in acute distress.     Appearance: She is ill-appearing.   HENT:      Head: Normocephalic and atraumatic.   Cardiovascular:      Rate and Rhythm: Normal rate and regular rhythm.      Pulses: Normal pulses.      Heart sounds: No murmur heard.  Pulmonary:      Effort: Pulmonary effort is normal. No respiratory distress.      Breath sounds: Rales present.   Abdominal:      General: Bowel sounds are normal.      Palpations: Abdomen is soft.      Tenderness: There is no abdominal tenderness.   Musculoskeletal:         General: Normal range of motion.      Cervical back: Normal range of motion and neck supple.      Right lower leg: No edema.      Left lower leg: No edema.   Skin:     General: Skin is warm and dry.   Neurological:      General: No focal deficit present.      Mental Status: She is alert and oriented to person, place, and time.   Psychiatric:         Mood and Affect: Mood normal.         Behavior: Behavior normal.             Result Review    Result Review:  I have personally reviewed the results from the time of this admission to 8/21/2023 09:45 EDT and agree with these findings:  [x]  Laboratory  []  Microbiology  [x]  Radiology  [x]  EKG/Telemetry   [x]  Cardiology/Vascular   []  Pathology  []  Old records  []  Other:            Assessment & Plan      Brief Patient Summary:  Justa Coles is a 63 y.o. female who presented to [facility] on 8/20/2023 complaining of shortness of breath that has been present over the past 8 days and worsening over the past couple of days.  She was diagnosed with a bronchitis on 8/11/2023 at an urgent care and was discharged with 7 days of doxycycline and a Medrol Dosepak.  Her symptoms did not improve so she presented to our emergency department on 8/16/2023 where she was diagnosed with a COPD exacerbation and was started on prednisone and discharged home.  Patient states she was feeling better until a couple of days ago.  She is not normally on oxygen but  has required supplemental oxygen by nasal cannula since arrival to the ED today.  She denies any chest pain.  She has had some chills today but no documented fever.  She denies nausea, vomiting, diarrhea.  She also states that she is having back pain across her bra line that she believes is from coughing.  She said it only hurts when she tries to take a deep breath, therefore is causing her to breathe shallowly.      azithromycin, 500 mg, Intravenous, Q24H  cefTRIAXone, 2,000 mg, Intravenous, Q24H  enoxaparin, 40 mg, Subcutaneous, Q24H  methIMAzole, 5 mg, Oral, Once per day on Mon Thu  senna-docusate sodium, 2 tablet, Oral, BID             Active Hospital Problems:  Active Hospital Problems    Diagnosis     **Pneumonia      Plan:     Pneumonia  Shortness of breath  Back pain from coughing  Chest x-ray shows right middle lobe opacity concerning for developing pneumonia  Respiratory panel negative  Continue current antibiotics, ceftriaxone.   Lactic acid normal  Blood cultures pending  DuoNebs as needed  Supplemental oxygen as needed, still requiring 4L NC   Incentive spirometry  Pain control       Leukocytosis  Hyperglycemia  WBC 19.8 up from 8.3 five days ago, WBC 14.80   Glucose 188  A1c 5.7  She has been on steroids for 7 days  No history of diabetes  Will continue to monitor with daily labs       DVT prophylaxis:  Medical DVT prophylaxis orders are present.    CODE STATUS:    Code Status (Patient has no pulse and is not breathing): CPR (Attempt to Resuscitate)  Medical Interventions (Patient has pulse or is breathing): Full Support      Disposition:  I expect patient to be discharged 24-48 hours      Electronically signed by Rand Bledsoe PA-C, 08/21/23, 09:45 EDT.  [facility] Hospitalist Team

## 2023-08-21 NOTE — PLAN OF CARE
Goal Outcome Evaluation:      Pt pain under control with dilaudid but needs frequently. Pt needs 4L NC to keep O2 above 90%. Pt feeling very weak but able to ambulate to restroom.VSS

## 2023-08-21 NOTE — ACP (ADVANCE CARE PLANNING)
Case Management/Social Work    Patient Name:  Justa Coles  YOB: 1960  MRN: [id number]  Admit Date:  8/20/2023      SW met with Pt at bedside. SW explained IN Healthcare Representative form. Pt voiced she knows how to fill it out. SW advised Pt to wait to sign as it is mandatory for two non-family witnesses to sign the form. SW explained that the witness will sign to signify that they watch her sign the document. Pt and family voiced understanding.           Melany Davis, FABIANW, LSW, APHSW-C  Medical Social Worker  [address] [address]  Office: [phone]  Fax: [phone]

## 2023-08-22 LAB
BASOPHILS # BLD AUTO: 0.1 10*3/MM3 (ref 0–0.2)
BASOPHILS NFR BLD AUTO: 0.3 % (ref 0–1.5)
DEPRECATED RDW RBC AUTO: 51.6 FL (ref 37–54)
DEPRECATED RDW RBC AUTO: 52.1 FL (ref 37–54)
EOSINOPHIL # BLD AUTO: 0.3 10*3/MM3 (ref 0–0.4)
EOSINOPHIL NFR BLD AUTO: 1.6 % (ref 0.3–6.2)
ERYTHROCYTE [DISTWIDTH] IN BLOOD BY AUTOMATED COUNT: 15.3 % (ref 12.3–15.4)
ERYTHROCYTE [DISTWIDTH] IN BLOOD BY AUTOMATED COUNT: 15.5 % (ref 12.3–15.4)
HCT VFR BLD AUTO: 37.5 % (ref 34–46.6)
HCT VFR BLD AUTO: 39.1 % (ref 34–46.6)
HGB BLD-MCNC: 12.3 G/DL (ref 12–15.9)
HGB BLD-MCNC: 12.7 G/DL (ref 12–15.9)
LARGE PLATELETS: ABNORMAL
LYMPHOCYTES # BLD AUTO: 0.8 10*3/MM3 (ref 0.7–3.1)
LYMPHOCYTES # BLD MANUAL: 1.74 10*3/MM3 (ref 0.7–3.1)
LYMPHOCYTES NFR BLD AUTO: 4 % (ref 19.6–45.3)
LYMPHOCYTES NFR BLD MANUAL: 6 % (ref 5–12)
MCH RBC QN AUTO: 29.7 PG (ref 26.6–33)
MCH RBC QN AUTO: 29.8 PG (ref 26.6–33)
MCHC RBC AUTO-ENTMCNC: 32.6 G/DL (ref 31.5–35.7)
MCHC RBC AUTO-ENTMCNC: 32.8 G/DL (ref 31.5–35.7)
MCV RBC AUTO: 90.9 FL (ref 79–97)
MCV RBC AUTO: 91.1 FL (ref 79–97)
METAMYELOCYTES NFR BLD MANUAL: 1 % (ref 0–0)
MONOCYTES # BLD AUTO: 1.2 10*3/MM3 (ref 0.1–0.9)
MONOCYTES # BLD: 1.31 10*3/MM3 (ref 0.1–0.9)
MONOCYTES NFR BLD AUTO: 6.2 % (ref 5–12)
MYELOCYTES NFR BLD MANUAL: 1 % (ref 0–0)
NEUTROPHILS # BLD AUTO: 18.31 10*3/MM3 (ref 1.7–7)
NEUTROPHILS NFR BLD AUTO: 17.1 10*3/MM3 (ref 1.7–7)
NEUTROPHILS NFR BLD AUTO: 87.9 % (ref 42.7–76)
NEUTROPHILS NFR BLD MANUAL: 50 % (ref 42.7–76)
NEUTS BAND NFR BLD MANUAL: 34 % (ref 0–5)
NRBC BLD AUTO-RTO: 0.1 /100 WBC (ref 0–0.2)
PLATELET # BLD AUTO: 263 10*3/MM3 (ref 140–450)
PLATELET # BLD AUTO: 281 10*3/MM3 (ref 140–450)
PMV BLD AUTO: 7.9 FL (ref 6–12)
PMV BLD AUTO: 8.7 FL (ref 6–12)
RBC # BLD AUTO: 4.13 10*6/MM3 (ref 3.77–5.28)
RBC # BLD AUTO: 4.29 10*6/MM3 (ref 3.77–5.28)
RBC MORPH BLD: NORMAL
SCAN SLIDE: NORMAL
T4 FREE SERPL-MCNC: 1.24 NG/DL (ref 0.93–1.7)
VARIANT LYMPHS NFR BLD MANUAL: 2 % (ref 0–5)
VARIANT LYMPHS NFR BLD MANUAL: 6 % (ref 19.6–45.3)
WBC MORPH BLD: NORMAL
WBC NRBC COR # BLD: 19.5 10*3/MM3 (ref 3.4–10.8)
WBC NRBC COR # BLD: 21.8 10*3/MM3 (ref 3.4–10.8)

## 2023-08-22 PROCEDURE — 25010000002 ENOXAPARIN PER 10 MG: Performed by: NURSE PRACTITIONER

## 2023-08-22 PROCEDURE — 94799 UNLISTED PULMONARY SVC/PX: CPT

## 2023-08-22 PROCEDURE — 25010000002 METHYLPREDNISOLONE PER 125 MG: Performed by: NURSE PRACTITIONER

## 2023-08-22 PROCEDURE — 94664 DEMO&/EVAL PT USE INHALER: CPT

## 2023-08-22 PROCEDURE — 85025 COMPLETE CBC W/AUTO DIFF WBC: CPT | Performed by: NURSE PRACTITIONER

## 2023-08-22 PROCEDURE — 25010000002 AZITHROMYCIN PER 500 MG: Performed by: NURSE PRACTITIONER

## 2023-08-22 PROCEDURE — 25010000002 CEFTRIAXONE PER 250 MG: Performed by: NURSE PRACTITIONER

## 2023-08-22 PROCEDURE — 94640 AIRWAY INHALATION TREATMENT: CPT

## 2023-08-22 PROCEDURE — 25010000002 HYDROMORPHONE 1 MG/ML SOLUTION: Performed by: INTERNAL MEDICINE

## 2023-08-22 RX ORDER — IPRATROPIUM BROMIDE AND ALBUTEROL SULFATE 2.5; .5 MG/3ML; MG/3ML
3 SOLUTION RESPIRATORY (INHALATION)
Status: DISCONTINUED | OUTPATIENT
Start: 2023-08-22 | End: 2023-08-22

## 2023-08-22 RX ORDER — ATORVASTATIN CALCIUM 10 MG/1
10 TABLET, FILM COATED ORAL 2 TIMES WEEKLY
Status: DISCONTINUED | OUTPATIENT
Start: 2023-08-24 | End: 2023-08-23 | Stop reason: HOSPADM

## 2023-08-22 RX ORDER — METHYLPREDNISOLONE SODIUM SUCCINATE 125 MG/2ML
60 INJECTION, POWDER, LYOPHILIZED, FOR SOLUTION INTRAMUSCULAR; INTRAVENOUS ONCE
Status: COMPLETED | OUTPATIENT
Start: 2023-08-22 | End: 2023-08-22

## 2023-08-22 RX ORDER — HYDROCODONE BITARTRATE AND ACETAMINOPHEN 5; 325 MG/1; MG/1
1 TABLET ORAL EVERY 6 HOURS PRN
Status: DISCONTINUED | OUTPATIENT
Start: 2023-08-22 | End: 2023-08-23 | Stop reason: HOSPADM

## 2023-08-22 RX ORDER — AMLODIPINE BESYLATE 5 MG/1
2.5 TABLET ORAL DAILY
Status: DISCONTINUED | OUTPATIENT
Start: 2023-08-22 | End: 2023-08-23 | Stop reason: HOSPADM

## 2023-08-22 RX ADMIN — PROPRANOLOL HYDROCHLORIDE 20 MG: 20 TABLET ORAL at 20:45

## 2023-08-22 RX ADMIN — HYDROCODONE BITARTRATE AND ACETAMINOPHEN 1 TABLET: 5; 325 TABLET ORAL at 19:43

## 2023-08-22 RX ADMIN — ENOXAPARIN SODIUM 40 MG: 100 INJECTION SUBCUTANEOUS at 17:54

## 2023-08-22 RX ADMIN — BUDESONIDE AND FORMOTEROL FUMARATE DIHYDRATE 2 PUFF: 160; 4.5 AEROSOL RESPIRATORY (INHALATION) at 08:32

## 2023-08-22 RX ADMIN — CEFTRIAXONE 2000 MG: 2 INJECTION, POWDER, FOR SOLUTION INTRAMUSCULAR; INTRAVENOUS at 10:18

## 2023-08-22 RX ADMIN — BUDESONIDE AND FORMOTEROL FUMARATE DIHYDRATE 2 PUFF: 160; 4.5 AEROSOL RESPIRATORY (INHALATION) at 18:40

## 2023-08-22 RX ADMIN — TIOTROPIUM BROMIDE INHALATION SPRAY 2 PUFF: 3.12 SPRAY, METERED RESPIRATORY (INHALATION) at 12:01

## 2023-08-22 RX ADMIN — HYDROMORPHONE HYDROCHLORIDE 0.5 MG: 1 INJECTION, SOLUTION INTRAMUSCULAR; INTRAVENOUS; SUBCUTANEOUS at 04:13

## 2023-08-22 RX ADMIN — METHYLPREDNISOLONE SODIUM SUCCINATE 60 MG: 125 INJECTION, POWDER, FOR SOLUTION INTRAMUSCULAR; INTRAVENOUS at 12:06

## 2023-08-22 RX ADMIN — ACETAMINOPHEN 650 MG: 325 TABLET, FILM COATED ORAL at 04:12

## 2023-08-22 RX ADMIN — AZITHROMYCIN MONOHYDRATE 500 MG: 500 INJECTION, POWDER, LYOPHILIZED, FOR SOLUTION INTRAVENOUS at 12:06

## 2023-08-22 RX ADMIN — PROPRANOLOL HYDROCHLORIDE 20 MG: 20 TABLET ORAL at 10:16

## 2023-08-22 NOTE — CASE MANAGEMENT/SOCIAL WORK
Discharge Planning Assessment   [facility]     Patient Name: Justa Coles  MRN: [id number]  Today's Date: 8/22/2023    Admit Date: 8/20/2023    Plan: Home with family, watch for Oxygen needs   Discharge Needs Assessment    No documentation.                  Discharge Plan       Row Name 08/22/23 1116       Plan    Plan Comments DC Barriers: Tmax today 100; O2 @ 3L; BUN slightly increased from yesterday. IV antibiotics                  Continued Care and Services - Admitted Since 8/20/2023    Coordination has not been started for this encounter.       Expected Discharge Date and Time       Expected Discharge Date Expected Discharge Time    Aug 23, 2023           Aliza Rios RN, [facility]  Office: [phone]  Fax: [phone]  [email or web address]      Phone communication or documentation only - no physical contact with patient or family.    Aliza Rois RN

## 2023-08-22 NOTE — PLAN OF CARE
Goal Outcome Evaluation:      Patient is stable on 2.5 liters oxygen. No complaints of pain, will discharge home when applicable. Will continue to monitor.  Problem: Pain Acute  Goal: Acceptable Pain Control and Functional Ability  Outcome: Ongoing, Progressing  Intervention: Prevent or Manage Pain  Description: Evaluate pain level, effect of treatment and patient response at regular intervals.  Minimize painful stimuli; coordinate care and adjust environment (e.g., light, noise, unnecessary movement); promote sleep/rest.  Match pharmacologic analgesia to severity and type of pain mechanism (e.g., neuropathic, muscle, inflammatory); consider multimodal approach (e.g., nonopioid, opioid, adjuvant).  Provide medication at regular intervals; titrate to patient response; premedicate for painful procedures.  Manage breakthrough pain with additional doses; consider rotation or switching medication.  Monitor for signs of substance tolerance (increased dose to reach desired effect, decreased effect with same dose).  Manage medication-induced effects, such as constipation, nausea, pruritus, urinary retention, somnolence and dizziness.  Provide multimodal interventions, such as as physical activity, therapeutic exercise, yoga, TENS (transcutaneous electrical nerve stimulation) and manual therapy.  Train in functional activity modifications, such as body mechanics, posture, ergonomics, energy conservation and activity pacing.  Consider addition of complementary or alternative therapy, such as acupuncture, hypnosis or therapeutic touch.  Recent Flowsheet Documentation  Taken 8/22/2023 0800 by Estelle Colorado RN  Medication Review/Management:   medications reviewed   high-risk medications identified  Intervention: Optimize Psychosocial Wellbeing  Description: Facilitate patient's self-control over pain by providing pain information and allowing choices in treatment.  Consider and address emotional response to pain.  Explore and  promote use of coping strategies; address barriers to successful coping.  Evaluate and assist with psychosocial, cultural and spiritual factors impacting pain.  Modify pain perception using techniques, such as distraction, mindfulness, guided imagery, meditation or music.  Assess for risk factors for developing chronic pain, such as depression, fear, pain avoidance and pain catastrophizing.  Consider referral for ongoing coping support, such as education, relaxation training and role of thoughts.  Recent Flowsheet Documentation  Taken 8/22/2023 1600 by Estelle Colorado RN  Supportive Measures: active listening utilized  Taken 8/22/2023 1200 by Estelle Colorado RN  Supportive Measures: active listening utilized  Taken 8/22/2023 0800 by Estelle Colorado RN  Supportive Measures:   active listening utilized   verbalization of feelings encouraged  Diversional Activities: smartphone

## 2023-08-22 NOTE — PROGRESS NOTES
[facility] Medicine Services   Daily Progress Note    Patient Name: Justa Coles  : 1960  MRN: [id number]  Primary Care Physician:  Michael Cho MD  Date of admission: 2023  Date and Time of Service: 2023 at 10 AM      Subjective      Chief Complaint: Shortness of breath      Brief Patient Summary:  Justa Coles is a 63 y.o. female who presented to [facility] on 2023 complaining of shortness of breath that has been present over the past 8 days and worsening over the past couple of days.  She was diagnosed with a bronchitis on 2023 at an urgent care and was discharged with 7 days of doxycycline and a Medrol Dosepak.  Her symptoms did not improve so she presented to our emergency department on 2023 where she was diagnosed with a COPD exacerbation and was started on prednisone and discharged home.  Patient states she was feeling better until a couple of days ago.  She is not normally on oxygen but has required supplemental oxygen by nasal cannula since arrival to the ED today.  She denies any chest pain.  She has had some chills today but no documented fever.  She denies nausea, vomiting, diarrhea.  She also states that she is having back pain across her bra line that she believes is from coughing.  She said it only hurts when she tries to take a deep breath, therefore is causing her to breathe shallowly.    Daily update:     2023: The patient is frustrated that she is not getting any better and has been treated over the last 12 days.  The patient reports that she has not officially been diagnosed through pulmonary functions test with COPD but has been treated by her primary care provider.  The patient does not like to take breathing treatments because they make her heart rate go up and her blood pressure go up and thinks that that is interfering with her Graves' disease.  The patient is on propranolol and Topazole chronically for Graves' disease.  I  "discussed with the patient that in order to get her oxygen demand down we will have to do something to open the airways.  We will restart steroids and recommend DuoNebs every 4 hours x 2.  Patient is satting approximately 88 to 90% on room air while sitting in bed.  She desats with any activity     Progress Note 8/21/23:  Patient reports she feels a bit better today. She is still having pain in her upper back with cough. She is still requiring 4LNC, she is not on home O2. She denies any cough or fever. She denies any N/V/C/D. She denies any new symptoms. Will continue with ABX and O2 as needed.         Subjective:  Symptoms:  Stable.  She reports shortness of breath.    Diet:  Poor intake.  No nausea.    Activity level: Normal.    Pain:  She reports no pain.        Objective      Vitals:   Temp:  [97.4 øF (36.3 øC)-100 øF (37.8 øC)] 99.8 øF (37.7 øC)  Heart Rate:  [] 68  Resp:  [12-22] 12  BP: (106-160)/(44-61) 160/46  Flow (L/min):  [3-4] 3  Objective:  General Appearance:  Comfortable, well-appearing, in no acute distress and not in pain (Cachectic appearance).    Vital signs: (most recent): Blood pressure 105/60, pulse 95, temperature 97.9 øF (36.6 øC), temperature source Oral, resp. rate 19, height 165.1 cm (65\"), weight 38.5 kg (84 lb 14 oz), SpO2 90 %.  Vital signs are normal.  No fever.    Output: Producing urine.    HEENT: Normal HEENT exam.    Lungs:  Normal effort.  She is not in respiratory distress.  There are wheezes.  No decreased breath sounds.    Heart: Normal rate.  No murmur.   Abdomen: Abdomen is soft.  Bowel sounds are normal.   There is generalized tenderness.     Extremities: Normal range of motion.    Pulses: Distal pulses are intact.    Neurological: Patient is alert and oriented to person, place and time.    Pupils:  Pupils are equal, round, and reactive to light.    Skin:  Warm and dry.                    Result Review    Result Review:  I have personally reviewed the results from the " time of this admission to 8/22/2023 10:28 EDT and agree with these findings:  [x]  Laboratory  [x]  Microbiology  []  Radiology  []  EKG/Telemetry   []  Cardiology/Vascular   []  Pathology  [x]  Old records  []  Other:  Most notable findings include: Leukocytosis          Assessment & Plan        amLODIPine, 2.5 mg, Oral, Daily  [START ON 8/24/2023] atorvastatin, 10 mg, Oral, Once per day on Mon Thu  azithromycin, 500 mg, Intravenous, Q24H  budesonide-formoterol, 2 puff, Inhalation, BID - RT  cefTRIAXone, 2,000 mg, Intravenous, Q24H  enoxaparin, 40 mg, Subcutaneous, Q24H  ipratropium-albuterol, 3 mL, Nebulization, Q4H - RT  methIMAzole, 5 mg, Oral, Once per day on Mon Thu  propranolol, 20 mg, Oral, BID  senna-docusate sodium, 2 tablet, Oral, BID  tiotropium bromide monohydrate, 2 puff, Inhalation, Daily - RT             Active Hospital Problems:  Active Hospital Problems    Diagnosis     **Pneumonia      Plan:     Acute shortness of breath with hypoxic respiratory failure requiring oxygen maintain saturation--likely multifactorial with community-acquired pneumonia superimposed on COPD: Oxygen support as needed    Pneumonia, community-acquired: Continue IV Rocephin; continue IV azithromycin; incentive spirometry  -Check procalcitonin in a.m.  -Procalcitonin 8/16/2023 was less than 0.02  -MRSA probe - 8/21/2023  -Respiratory viral panel - 8/20/2023 and 8/16/2023  -Blood cultures pending    COPD without prior LFT, current wheezing: Patient would benefit from pulmonary consult at discharge; add Spiriva; continue Symbicort; add methylprednisone  -Patient is on Trelegy Ellipta at home  -Patient reports albuterol gives her palpitations  -Patient may have benefit from pulmonary consult at discharge    Hypertension, chronic: Continue amlodipine 2.5 mg daily    Hypothyroidism, chronic: Continue Tapazole; continue propranolol    HLD, chronic: Continue atorvastatin    Underweight--likely multifactorial with COPD and  hypothyroidism    DVT prophylaxis:  Medical DVT prophylaxis orders are present.    CODE STATUS:    Code Status (Patient has no pulse and is not breathing): CPR (Attempt to Resuscitate)  Medical Interventions (Patient has pulse or is breathing): Full Support      Disposition:  I expect patient to be discharged tomorrow 8/23/2023 as long as oxygen saturation improves and patient able to wean off of O2..    Plan of care discussed with patient, primary nursing.    This patient has been examined wearing appropriate Personal Protective Equipment   Electronically signed by JEFF Snow, 08/22/23, 10:28 EDT.  [facility], [place], operated by [facility] Hospitalist Team

## 2023-08-23 ENCOUNTER — READMISSION MANAGEMENT (OUTPATIENT)
Dept: CALL CENTER | Facility: HOSPITAL | Age: 63
End: 2023-08-23
Payer: COMMERCIAL

## 2023-08-23 VITALS
RESPIRATION RATE: 22 BRPM | HEIGHT: 65 IN | BODY MASS INDEX: 14.14 KG/M2 | TEMPERATURE: 98 F | SYSTOLIC BLOOD PRESSURE: 108 MMHG | DIASTOLIC BLOOD PRESSURE: 51 MMHG | HEART RATE: 96 BPM | WEIGHT: 84.88 LBS | OXYGEN SATURATION: 90 %

## 2023-08-23 PROBLEM — J44.9 COPD WITH HYPOXIA: Status: ACTIVE | Noted: 2023-08-23

## 2023-08-23 PROBLEM — R09.02 COPD WITH HYPOXIA: Status: ACTIVE | Noted: 2023-08-23

## 2023-08-23 LAB
ANION GAP SERPL CALCULATED.3IONS-SCNC: 9 MMOL/L (ref 5–15)
BUN SERPL-MCNC: 26 MG/DL (ref 8–23)
BUN/CREAT SERPL: 53.1 (ref 7–25)
CALCIUM SPEC-SCNC: 9.3 MG/DL (ref 8.6–10.5)
CHLORIDE SERPL-SCNC: 102 MMOL/L (ref 98–107)
CO2 SERPL-SCNC: 30 MMOL/L (ref 22–29)
CREAT SERPL-MCNC: 0.49 MG/DL (ref 0.57–1)
DEPRECATED RDW RBC AUTO: 48.1 FL (ref 37–54)
EGFRCR SERPLBLD CKD-EPI 2021: 105.4 ML/MIN/1.73
ERYTHROCYTE [DISTWIDTH] IN BLOOD BY AUTOMATED COUNT: 15 % (ref 12.3–15.4)
GLUCOSE SERPL-MCNC: 172 MG/DL (ref 65–99)
HCT VFR BLD AUTO: 38 % (ref 34–46.6)
HGB BLD-MCNC: 12.3 G/DL (ref 12–15.9)
LARGE PLATELETS: ABNORMAL
LYMPHOCYTES # BLD MANUAL: 1.45 10*3/MM3 (ref 0.7–3.1)
LYMPHOCYTES NFR BLD MANUAL: 1 % (ref 5–12)
MCH RBC QN AUTO: 29.6 PG (ref 26.6–33)
MCHC RBC AUTO-ENTMCNC: 32.3 G/DL (ref 31.5–35.7)
MCV RBC AUTO: 91.4 FL (ref 79–97)
MONOCYTES # BLD: 0.13 10*3/MM3 (ref 0.1–0.9)
NEUTROPHILS # BLD AUTO: 11.62 10*3/MM3 (ref 1.7–7)
NEUTROPHILS NFR BLD MANUAL: 84 % (ref 42.7–76)
NEUTS BAND NFR BLD MANUAL: 4 % (ref 0–5)
PLATELET # BLD AUTO: 294 10*3/MM3 (ref 140–450)
PMV BLD AUTO: 8.9 FL (ref 6–12)
POTASSIUM SERPL-SCNC: 4.5 MMOL/L (ref 3.5–5.2)
PROCALCITONIN SERPL-MCNC: 2.1 NG/ML (ref 0–0.25)
RBC # BLD AUTO: 4.16 10*6/MM3 (ref 3.77–5.28)
RBC MORPH BLD: NORMAL
SCAN SLIDE: NORMAL
SODIUM SERPL-SCNC: 141 MMOL/L (ref 136–145)
VARIANT LYMPHS NFR BLD MANUAL: 1 % (ref 0–5)
VARIANT LYMPHS NFR BLD MANUAL: 10 % (ref 19.6–45.3)
WBC MORPH BLD: NORMAL
WBC NRBC COR # BLD: 13.2 10*3/MM3 (ref 3.4–10.8)

## 2023-08-23 PROCEDURE — 25010000002 AZITHROMYCIN PER 500 MG: Performed by: NURSE PRACTITIONER

## 2023-08-23 PROCEDURE — 63710000001 PREDNISONE PER 1 MG: Performed by: NURSE PRACTITIONER

## 2023-08-23 PROCEDURE — 94799 UNLISTED PULMONARY SVC/PX: CPT

## 2023-08-23 PROCEDURE — 85025 COMPLETE CBC W/AUTO DIFF WBC: CPT | Performed by: NURSE PRACTITIONER

## 2023-08-23 PROCEDURE — 25010000002 CEFTRIAXONE PER 250 MG: Performed by: NURSE PRACTITIONER

## 2023-08-23 PROCEDURE — 36415 COLL VENOUS BLD VENIPUNCTURE: CPT | Performed by: NURSE PRACTITIONER

## 2023-08-23 PROCEDURE — 94618 PULMONARY STRESS TESTING: CPT

## 2023-08-23 PROCEDURE — 84145 PROCALCITONIN (PCT): CPT | Performed by: NURSE PRACTITIONER

## 2023-08-23 PROCEDURE — 85007 BL SMEAR W/DIFF WBC COUNT: CPT | Performed by: NURSE PRACTITIONER

## 2023-08-23 PROCEDURE — 80048 BASIC METABOLIC PNL TOTAL CA: CPT | Performed by: NURSE PRACTITIONER

## 2023-08-23 RX ORDER — PREDNISONE 20 MG/1
40 TABLET ORAL
Qty: 4 TABLET | Refills: 0 | Status: SHIPPED | OUTPATIENT
Start: 2023-08-24 | End: 2023-08-26

## 2023-08-23 RX ORDER — PREDNISONE 10 MG/1
TABLET ORAL
Qty: 26 TABLET | Refills: 0 | Status: SHIPPED | OUTPATIENT
Start: 2023-08-26 | End: 2023-09-06

## 2023-08-23 RX ORDER — CEFDINIR 300 MG/1
300 CAPSULE ORAL 2 TIMES DAILY
Qty: 14 CAPSULE | Refills: 0 | Status: SHIPPED | OUTPATIENT
Start: 2023-08-23 | End: 2023-08-30

## 2023-08-23 RX ORDER — AZITHROMYCIN 250 MG/1
TABLET, FILM COATED ORAL
Qty: 3 TABLET | Refills: 0 | Status: SHIPPED | OUTPATIENT
Start: 2023-08-23

## 2023-08-23 RX ORDER — PREDNISONE 10 MG/1
10 TABLET ORAL DAILY
Status: DISCONTINUED | OUTPATIENT
Start: 2023-09-01 | End: 2023-08-23 | Stop reason: HOSPADM

## 2023-08-23 RX ORDER — PREDNISONE 20 MG/1
20 TABLET ORAL DAILY
Status: DISCONTINUED | OUTPATIENT
Start: 2023-08-29 | End: 2023-08-23 | Stop reason: HOSPADM

## 2023-08-23 RX ORDER — PREDNISONE 20 MG/1
20 TABLET ORAL DAILY
Qty: 3 TABLET | Refills: 0 | Status: SHIPPED | OUTPATIENT
Start: 2023-08-29 | End: 2023-09-01

## 2023-08-23 RX ORDER — PREDNISONE 10 MG/1
10 TABLET ORAL DAILY
Qty: 3 TABLET | Refills: 0 | Status: SHIPPED | OUTPATIENT
Start: 2023-09-01 | End: 2023-09-04

## 2023-08-23 RX ORDER — PREDNISONE 20 MG/1
40 TABLET ORAL
Status: DISCONTINUED | OUTPATIENT
Start: 2023-08-23 | End: 2023-08-23 | Stop reason: HOSPADM

## 2023-08-23 RX ADMIN — PREDNISONE 40 MG: 20 TABLET ORAL at 13:58

## 2023-08-23 RX ADMIN — AMLODIPINE BESYLATE 2.5 MG: 5 TABLET ORAL at 09:42

## 2023-08-23 RX ADMIN — BUDESONIDE AND FORMOTEROL FUMARATE DIHYDRATE 2 PUFF: 160; 4.5 AEROSOL RESPIRATORY (INHALATION) at 07:38

## 2023-08-23 RX ADMIN — AZITHROMYCIN MONOHYDRATE 500 MG: 500 INJECTION, POWDER, LYOPHILIZED, FOR SOLUTION INTRAVENOUS at 11:24

## 2023-08-23 RX ADMIN — Medication 10 ML: at 09:46

## 2023-08-23 RX ADMIN — HYDROCODONE BITARTRATE AND ACETAMINOPHEN 1 TABLET: 5; 325 TABLET ORAL at 09:42

## 2023-08-23 RX ADMIN — TIOTROPIUM BROMIDE INHALATION SPRAY 2 PUFF: 3.12 SPRAY, METERED RESPIRATORY (INHALATION) at 07:33

## 2023-08-23 RX ADMIN — PROPRANOLOL HYDROCHLORIDE 20 MG: 20 TABLET ORAL at 09:42

## 2023-08-23 RX ADMIN — CEFTRIAXONE 2000 MG: 2 INJECTION, POWDER, FOR SOLUTION INTRAMUSCULAR; INTRAVENOUS at 10:46

## 2023-08-23 NOTE — CASE MANAGEMENT/SOCIAL WORK
Continued Stay Note  [facility]RE[facility]     Patient Name: Justa Coles  MRN: [id number]  Today's Date: 8/23/2023    Admit Date: 8/20/2023    Plan: Home with family, Watch for oxygen   Discharge Plan       Row Name 08/23/23 0846       Plan    Plan Home with family, Watch for oxygen    Plan Comments d/c barriers: IV ATB, O2@3L no oxygen at baseline                          Janet Escamilla RN

## 2023-08-23 NOTE — CASE MANAGEMENT/SOCIAL WORK
Continued Stay Note  [facility]RE[facility]     Patient Name: Justa Coles  MRN: [id number]  Today's Date: 8/23/2023    Admit Date: 8/20/2023    Plan: Home with family and [facility]   Discharge Plan       Row Name 08/23/23 1070       Plan    Plan Home with family and [facility]xygen    Plan Comments Met with Patient at bedside as she will need Oxygen at discharge. Declined using inhouse goulds requested [facility]. Notified Liaison Citlaly Gray and Order, Note and Walking test sent to [facility] for them to process and deliver portable tank to Patient prior to discharge                          Janet Escamilla RN

## 2023-08-23 NOTE — PLAN OF CARE
Problem: Pain Acute  Goal: Acceptable Pain Control and Functional Ability  Outcome: Met  Intervention: Prevent or Manage Pain  Recent Flowsheet Documentation  Taken 8/23/2023 1435 by Arleen Izaguirre, RN  Medication Review/Management: medications reviewed  Taken 8/23/2023 1220 by Arleen Izaguirre, RN  Medication Review/Management: medications reviewed   Goal Outcome Evaluation:   Medication and discharge paperwork reviewed with patient. IV removed; intact and clean. Transport home with family.

## 2023-08-23 NOTE — DISCHARGE SUMMARY
[facility] Hospital Medicine Services  Discharge Summary    Date of Service: 2023  Patient Name: Justa Coles  : 1960  MRN: [id number]    Date of Admission: 2023  Date of Discharge: 2023    Discharge Diagnosis: Community-acquired pneumonia; acute exacerbation COPD    Primary Care Physician: Michael Cho MD      Presenting Problem:   Pneumonia [J18.9]    Active and Resolved Hospital Problems:  Active Hospital Problems    Diagnosis POA    **Pneumonia [J18.9] Yes      Resolved Hospital Problems   No resolved problems to display.         Hospital Course     Hospital Course:  Justa Coles is a 63 y.o. female who presented to [facility] on 2023 complaining of shortness of breath that has been present over the past 8 days and worsening over the past couple of days.  She was diagnosed with a bronchitis on 2023 at an urgent care and was discharged with 7 days of doxycycline and a Medrol Dosepak.  Her symptoms did not improve so she presented to our emergency department on 2023 where she was diagnosed with a COPD exacerbation and was started on prednisone and discharged home.  Patient states she was feeling better until a couple of days ago.  She is not normally on oxygen but has required supplemental oxygen by nasal cannula since arrival to the ED today.  She denies any chest pain.  She has had some chills today but no documented fever.  She denies nausea, vomiting, diarrhea.  She also states that she is having back pain across her bra line that she believes is from coughing.  She said it only hurts when she tries to take a deep breath, therefore is causing her to breathe shallowly.      Date of discharge: The patient had improvement of her breathing but continued to desat when off oxygen.  I suspect that the patient has further advanced COPD than she realizes and has been compensating for a low oxygen level although this cannot be confirmed.  The patient  improved after starting steroids and convincing her to take breathing treatments even though they increase her heart rate temporarily.  The patient does not feel short of breath when her oxygen saturation is 88%.  She had walking oximetry with recommendation for 2 L per nasal cannula with exertion.  Ambulatory referral to pulmonology was placed and the patient was encouraged to follow-up and have pulmonary functions test completed.  The patient will be discharged on tapering dose of steroids over 12 days since she had recent steroid treatment as an outpatient with return of respiratory symptoms in less than 2 weeks.  The patient will be discharged on cefdinir and azithromycin to complete antibiotic course for community-acquired pneumonia.  Patient was agreeable to the plan and eager for discharge.           DISCHARGE Follow Up Recommendations for labs and diagnostics:     Patient needs follow-up PFTs, monitor referral.      Reasons For Change In Medications and Indications for New Medications:     Azithromycin and cefdinir to complete antibiotic course for community-acquired pneumonia.  Weaning dose of prednisone for acute exacerbation COPD.  Xopenex for breathing treatment.      Day of Discharge     Vital Signs:  Temp:  [97.9 øF (36.6 øC)-98.7 øF (37.1 øC)] 98 øF (36.7 øC)  Heart Rate:  [58-97] 96  Resp:  [14-24] 22  BP: (105-134)/(48-70) 108/51  Flow (L/min):  [2.5-4] 3    Physical Exam:  Physical Exam  Vitals and nursing note reviewed.   Constitutional:       Appearance: Normal appearance. She is cachectic.   HENT:      Head: Normocephalic and atraumatic.      Right Ear: External ear normal.      Left Ear: External ear normal.      Nose: Nose normal.      Mouth/Throat:      Mouth: Mucous membranes are moist.   Eyes:      General: No scleral icterus.        Right eye: No discharge.         Left eye: No discharge.      Extraocular Movements: Extraocular movements intact.      Conjunctiva/sclera: Conjunctivae  normal.      Pupils: Pupils are equal, round, and reactive to light.   Cardiovascular:      Rate and Rhythm: Normal rate and regular rhythm.      Pulses: Normal pulses.      Heart sounds: Normal heart sounds. No murmur heard.  Pulmonary:      Effort: Pulmonary effort is normal. No respiratory distress.      Breath sounds: No stridor. Wheezing present. No rhonchi.   Chest:      Chest wall: No tenderness.   Abdominal:      General: Abdomen is flat. Bowel sounds are normal.      Palpations: Abdomen is soft.   Musculoskeletal:         General: Normal range of motion.      Cervical back: Normal range of motion and neck supple.      Right lower leg: No edema.      Left lower leg: No edema.   Skin:     General: Skin is warm and dry.      Capillary Refill: Capillary refill takes less than 2 seconds.      Coloration: Skin is not jaundiced or pale.   Neurological:      General: No focal deficit present.      Mental Status: She is alert and oriented to person, place, and time.   Psychiatric:         Mood and Affect: Mood normal.         Behavior: Behavior is cooperative.         Thought Content: Thought content normal.         Judgment: Judgment normal.          Pertinent  and/or Most Recent Results     LAB RESULTS:      Lab 08/23/23  0131 08/22/23  1326 08/21/23 2309 08/21/23  0406 08/20/23 2108 08/20/23 2052   WBC 13.20* 19.50* 21.80* 14.80*  --  19.80*   HEMOGLOBIN 12.3 12.3 12.7 14.6  --  14.4   HEMATOCRIT 38.0 37.5 39.1 44.4  --  44.8   PLATELETS 294 263 281 348  --  442   NEUTROS ABS 11.62* 17.10* 18.31* 12.88*  --  16.43*   LYMPHS ABS  --  0.80  --   --   --   --    MONOS ABS  --  1.20*  --   --   --   --    EOS ABS  --  0.30  --   --   --   --    MCV 91.4 90.9 91.1 90.8  --  91.5   PROCALCITONIN 2.10*  --   --   --   --   --    LACTATE  --   --   --   --  1.9  --          Lab 08/23/23  0131 08/21/23  2309 08/21/23  0406 08/20/23  2154 08/20/23  2052   SODIUM 141 138 135* 142  --    POTASSIUM 4.5 5.2 4.6 4.3  --     CHLORIDE 102 101 98 105  --    CO2 30.0* 31.0* 25.0 27.0  --    ANION GAP 9.0 6.0 12.0 10.0  --    BUN 26* 29* 24* 22  --    CREATININE 0.49* 0.50* 0.50* 0.42*  --    EGFR 105.4 104.9 104.9 109.4  --    GLUCOSE 172* 138* 188* 156*  --    CALCIUM 9.3 9.0 8.6 8.4*  --    MAGNESIUM  --   --   --  2.7*  --    HEMOGLOBIN A1C  --   --   --   --  5.70*         Lab 08/20/23  2154   TOTAL PROTEIN 6.1   ALBUMIN 3.6   GLOBULIN 2.5   ALT (SGPT) 22   AST (SGOT) 17   BILIRUBIN 0.5   ALK PHOS 80                     Lab 08/20/23 2126   PH, ARTERIAL 7.429   PCO2, ARTERIAL 38.4   PO2 ART 74.5*   O2 SATURATION ART 95.2   FIO2 36   HCO3 ART 25.4   BASE EXCESS ART 1.2     Brief Urine Lab Results       None          Microbiology Results (last 10 days)       Procedure Component Value - Date/Time    MRSA Screen, PCR (Inpatient) - Swab, Nares [[id number]]  (Normal) Collected: 08/21/23 0310    Lab Status: Final result Specimen: Swab from Nares Updated: 08/21/23 0438     MRSA PCR No MRSA Detected    Narrative:      The negative predictive value of this diagnostic test is high and should only be used to consider de-escalating anti-MRSA therapy. A positive result may indicate colonization with MRSA and must be correlated clinically.    Respiratory Panel PCR w/COVID-19(SARS-CoV-2) LAURA/AARTI/ITA/PAD/COR/MAD/IRMA In-House, NP Swab in UTM/VTM, 3-4 HR TAT - Swab, Nasopharynx [[id number]]  (Normal) Collected: 08/20/23 2146    Lab Status: Final result Specimen: Swab from Nasopharynx Updated: 08/20/23 2238     ADENOVIRUS, PCR Not Detected     Coronavirus 229E Not Detected     Coronavirus HKU1 Not Detected     Coronavirus NL63 Not Detected     Coronavirus OC43 Not Detected     COVID19 Not Detected     Human Metapneumovirus Not Detected     Human Rhinovirus/Enterovirus Not Detected     Influenza A PCR Not Detected     Influenza B PCR Not Detected     Parainfluenza Virus 1 Not Detected     Parainfluenza Virus 2 Not Detected     Parainfluenza Virus 3 Not  Detected     Parainfluenza Virus 4 Not Detected     RSV, PCR Not Detected     Bordetella pertussis pcr Not Detected     Bordetella parapertussis PCR Not Detected     Chlamydophila pneumoniae PCR Not Detected     Mycoplasma pneumo by PCR Not Detected    Narrative:      In the setting of a positive respiratory panel with a viral infection PLUS a negative procalcitonin without other underlying concern for bacterial infection, consider observing off antibiotics or discontinuation of antibiotics and continue supportive care. If the respiratory panel is positive for atypical bacterial infection (Bordetella pertussis, Chlamydophila pneumoniae, or Mycoplasma pneumoniae), consider antibiotic de-escalation to target atypical bacterial infection.    Blood Culture - Blood, Arm, Left [[id number]]  (Normal) Collected: 08/20/23 2114    Lab Status: Preliminary result Specimen: Blood from Arm, Left Updated: 08/22/23 2132     Blood Culture No growth at 2 days    Blood Culture - Blood, Arm, Left [[id number]]  (Normal) Collected: 08/20/23 2114    Lab Status: Preliminary result Specimen: Blood from Arm, Left Updated: 08/22/23 2132     Blood Culture No growth at 2 days    Respiratory Panel PCR w/COVID-19(SARS-CoV-2) LAURA/AARTI/ITA/PAD/COR/MAD/IRMA In-House, NP Swab in UTM/VTM, 3-4 HR TAT - Swab, Nasopharynx [[id number]]  (Normal) Collected: 08/16/23 0638    Lab Status: Final result Specimen: Swab from Nasopharynx Updated: 08/16/23 0804     ADENOVIRUS, PCR Not Detected     Coronavirus 229E Not Detected     Coronavirus HKU1 Not Detected     Coronavirus NL63 Not Detected     Coronavirus OC43 Not Detected     COVID19 Not Detected     Human Metapneumovirus Not Detected     Human Rhinovirus/Enterovirus Not Detected     Influenza A PCR Not Detected     Influenza B PCR Not Detected     Parainfluenza Virus 1 Not Detected     Parainfluenza Virus 2 Not Detected     Parainfluenza Virus 3 Not Detected     Parainfluenza Virus 4 Not Detected     RSV, PCR Not  Detected     Bordetella pertussis pcr Not Detected     Bordetella parapertussis PCR Not Detected     Chlamydophila pneumoniae PCR Not Detected     Mycoplasma pneumo by PCR Not Detected    Narrative:      In the setting of a positive respiratory panel with a viral infection PLUS a negative procalcitonin without other underlying concern for bacterial infection, consider observing off antibiotics or discontinuation of antibiotics and continue supportive care. If the respiratory panel is positive for atypical bacterial infection (Bordetella pertussis, Chlamydophila pneumoniae, or Mycoplasma pneumoniae), consider antibiotic de-escalation to target atypical bacterial infection.    Blood Culture - Blood, Arm, Right [[id number]]  (Normal) Collected: 08/16/23 0636    Lab Status: Final result Specimen: Blood from Arm, Right Updated: 08/21/23 0701     Blood Culture No growth at 5 days    Blood Culture - Blood, Arm, Left [[id number]]  (Normal) Collected: 08/16/23 0636    Lab Status: Final result Specimen: Blood from Arm, Left Updated: 08/21/23 0701     Blood Culture No growth at 5 days            XR Chest 2 View    Result Date: 8/11/2023  Impression: Impression: 1. Subtle nodular densities bilaterally. Recommend nonemergent chest CT. Electronically Signed: Tomi Perez MD  8/11/2023 1:08 PM EDT  Workstation ID: [id number]    XR Chest 1 View    Result Date: 8/20/2023  Impression: Impression: Interval development of mild patchy airspace disease in the medial right lower lobe which could be from atelectasis or developing pneumonia. There are stable interstitial and emphysematous changes. Electronically Signed: Nilesh Fairbanks MD  8/20/2023 9:37 PM EDT  Workstation ID: [id number]    XR Chest 1 View    Result Date: 8/16/2023  Impression: Impression: No acute cardiopulmonary process. Stable chronic interstitial changes and scarring. Electronically Signed: Becki Estrada MD  8/16/2023 6:18 AM EDT  Workstation ID: [id number]                 Labs  Pending at Discharge:  Pending Labs       Order Current Status    Blood Culture - Blood, Arm, Left Preliminary result    Blood Culture - Blood, Arm, Left Preliminary result            Procedures Performed           Consults:   Consults       Date and Time Order Name Status Description    8/20/2023 10:42 PM Hospitalist (on-call MD unless specified)                Discharge Details        Discharge Medications        New Medications        Instructions Start Date   azithromycin 250 MG tablet  Commonly known as: Zithromax   Start tomorrow 8/24/2023, Take 1 tablet by mouth daily for 3 days.      cefdinir 300 MG capsule  Commonly known as: OMNICEF   300 mg, Oral, 2 Times Daily, Start tomorrow morning      predniSONE 20 MG tablet  Commonly known as: DELTASONE   40 mg, Oral, Daily With Breakfast   Start Date: August 24, 2023     predniSONE 10 MG tablet  Commonly known as: DELTASONE   Take 4 tablets by mouth Daily for 2 days, THEN 3 tablets Daily for 3 days, THEN 2 tablets Daily for 3 days, THEN 1 tablet Daily for 3 days.   Start Date: August 26, 2023     predniSONE 20 MG tablet  Commonly known as: DELTASONE   20 mg, Oral, Daily   Start Date: August 29, 2023     predniSONE 10 MG tablet  Commonly known as: DELTASONE   10 mg, Oral, Daily   Start Date: September 1, 2023            Continue These Medications        Instructions Start Date   albuterol sulfate  (90 Base) MCG/ACT inhaler  Commonly known as: PROVENTIL HFA;VENTOLIN HFA;PROAIR HFA   2 puffs, Inhalation, Every 6 Hours PRN      amLODIPine 2.5 MG tablet  Commonly known as: NORVASC   2.5 mg, Oral, Daily      atorvastatin 10 MG tablet  Commonly known as: LIPITOR   10 mg, Oral, 2 Times Weekly, Monday and Thursdays       budesonide 1 MG/2ML nebulizer solution  Commonly known as: PULMICORT   Take 2 mL by nebulization Daily.      levalbuterol 1.25 MG/3ML nebulizer solution  Commonly known as: XOPENEX   Take 1 ampule by nebulization Every 4 (Four) Hours.       levalbuterol 45 MCG/ACT inhaler  Commonly known as: XOPENEX HFA   1-2 puffs, Inhalation, Every 6 Hours PRN      methIMAzole 5 MG tablet  Commonly known as: TAPAZOLE   5 mg, Oral, 2 Times Weekly, Mondays and Thursdays       propranolol 20 MG tablet  Commonly known as: INDERAL   20 mg, Oral, 2 Times Daily      Trelegy Ellipta 200-62.5-25 MCG/ACT aerosol powder   Generic drug: Fluticasone-Umeclidin-Vilant   1 puff, Inhalation, Daily               Allergies   Allergen Reactions    Sulfa Antibiotics Hives         Discharge Disposition:     Stable; home, self-care      Diet:  Hospital:  Diet Order   Procedures    Diet: Regular/House Diet; Texture: Regular Texture (IDDSI 7); Fluid Consistency: Thin (IDDSI 0)         Discharge Activity: As tolerated        CODE STATUS:  Code Status and Medical Interventions:   Ordered at: 08/21/23 0158     Code Status (Patient has no pulse and is not breathing):    CPR (Attempt to Resuscitate)     Medical Interventions (Patient has pulse or is breathing):    Full Support         Future Appointments   Date Time Provider Department Center   2/5/2024 11:15 AM Oscar Bhandari MD [facility]           Time spent on Discharge including face to face service:  35 minutes    Patient was examined using appropriate personal protective equipment    Signature: Electronically signed by JEFF Snow, 08/23/23, 14:53 EDT.  [facility]ist Team

## 2023-08-23 NOTE — PROGRESS NOTES
Exercise Oximetry    Patient Name:Justa Coles   MRN: [id number]   Date: 08/23/23             ROOM AIR BASELINE   SpO2%                      87%   Heart Rate                 92   Blood Pressure      EXERCISE ON ROOM AIR SpO2% EXERCISE ON O2 @  LPM SpO2%   1 MINUTE  1 MINUTE                    1  89   2 MINUTES  2 MINUTES                  1  88   3 MINUTES  3 MINUTES                  1  84    4 MINUTES  4 MINUTES                   2  88   5 MINUTES  5 MINUTES                   2   90    6 MINUTES  6 MINUTES                   2   92               Distance Walked   Distance Walked                 150 ft    Dyspnea (Paulo Scale)   Dyspnea (Paulo Scale)   Fatigue (Paulo Scale)   Fatigue (Paulo Scale)   SpO2% Post Exercise   SpO2% Post Exercise          89%    HR Post Exercise   HR Post Exercise                  112    Time to Recovery   Time to Recovery                   3 mins      Comments: Pt requires 2 lpm via Nasal Cannula at rest and with exertion.

## 2023-08-23 NOTE — DISCHARGE INSTRUCTIONS
You are being discharged after being hospitalized in treated with IV antibiotics for pneumonia community-acquired pneumonia.  You are being discharged with antibiotics to complete your antibiotic course.  Please finish all these medications.    You are being discharged with weaning steroids.  You will take prednisone 40 mg daily x3 days, 30 mg daily x3 days, 20 mg daily x3 days, and 10 mg daily x3 days.    I recommend that she follow-up with pulmonologist.  An ambulatory referral to pulmonology be has been made  .  You are being discharged with home oxygen because her oxygen level dropped below 90%, especially when you are up and active.  Please wear this oxygen when you are exerting yourself to help keep your oxygen level at an acceptable level.    It has been a pleasure for Dr. Mayers and me, Desire AGUILAR, to participate in your care.  I am glad you are feeling better.  I wish you all the best in your continued recovery.

## 2023-08-23 NOTE — DISCHARGE PLACEMENT REQUEST
"Huseyin Coles \"Huseyin Coles\" (63 y.o. Female)       Date of Birth   1960    Social Security Number       Address   [address] [place] [address] [place] [facility]    Home Phone   [phone]    MRN   [id number]       Samaritan   Rastafari    Marital Status                               Admission Date   8/20/23    Admission Type   Emergency    Admitting Provider   Young Carr MD    Attending Provider   Paul Mayers MD    Department, Room/Bed   [facility] EMERGENCY DEPARTMENT, [facility]/5       Discharge Date       Discharge Disposition       Discharge Destination                                 Attending Provider: Paul Mayers MD    Allergies: Sulfa Antibiotics    Isolation: None   Infection: None   Code Status: CPR    Ht: 165.1 cm (65\")   Wt: 38.5 kg (84 lb 14 oz)    Admission Cmt: None   Principal Problem: Pneumonia [J18.9]                   Active Insurance as of 8/20/2023       Primary Coverage       Payor Plan Insurance Group Employer/Plan Group    [employer] [facility] [place] [employer] [facility] [id number]       Payor Plan Address Payor Plan Phone Number Payor Plan Fax Number Effective Dates    [address] [phone]  10/1/2018 - None Entered    [facility] [id number]         Subscriber Name Subscriber Birth Date Member ID       HUSEYIN COLES 1960 [id number]                     Emergency Contacts        (Rel.) Home Phone Work Phone Mobile Phone    STODDARDGLENYS (Son) -- -- [phone]    PRISCILLA STODDARD (Relative) -- -- --               Violette Moran, RRT   Respiratory Therapist  Respiratory Therapy     Progress Notes     Signed     Date of Service: 08/23/23 1357  Creation Time: 08/23/23 1357     Signed         Exercise Oximetry     Patient Name:Huseyin Coles   MRN: [id number]   Date: 08/23/23                                                                                                     ROOM AIR BASELINE   SpO2%                      87% "   Heart Rate                 92   Blood Pressure       EXERCISE ON ROOM AIR SpO2% EXERCISE ON O2 @  LPM SpO2%   1 MINUTE   1 MINUTE                    1  89   2 MINUTES   2 MINUTES                  1  88   3 MINUTES   3 MINUTES                  1  84    4 MINUTES   4 MINUTES                   2  88   5 MINUTES   5 MINUTES                   2   90    6 MINUTES   6 MINUTES                   2   92                                                                                                                  Distance Walked   Distance Walked                 150 ft    Dyspnea (Paulo Scale)   Dyspnea (Paulo Scale)   Fatigue (Paulo Scale)   Fatigue (Paulo Scale)   SpO2% Post Exercise   SpO2% Post Exercise          89%    HR Post Exercise   HR Post Exercise                  112    Time to Recovery   Time to Recovery                   3 mins       Comments: Pt requires 2 lpm via Nasal Cannula at rest and with exertion.                      Roldan, Desire, APRN   Nurse Practitioner  Hospitalist     Progress Notes     Attested     Date of Service: 23 102  Creation Time: 23     Attested           Attestation signed by Paul Mayers MD at 23     I have reviewed this documentation and agree.               Expand All Collapse All          [facility] Medicine Services   Daily Progress Note     Patient Name: Justa Coles  : 1960  MRN: [id number]  Primary Care Physician:  Michael Cho MD  Date of admission: 2023  Date and Time of Service: 2023 at 10 AM        Subjective       Chief Complaint: Shortness of breath        Brief Patient Summary:  Justa Coles is a 63 y.o. female who presented to [facility] on 2023 complaining of shortness of breath that has been present over the past 8 days and worsening over the past couple of days.  She was diagnosed with a bronchitis on 2023 at an urgent care and was discharged with 7 days of doxycycline and a Medrol  Dosepak.  Her symptoms did not improve so she presented to our emergency department on 8/16/2023 where she was diagnosed with a COPD exacerbation and was started on prednisone and discharged home.  Patient states she was feeling better until a couple of days ago.  She is not normally on oxygen but has required supplemental oxygen by nasal cannula since arrival to the ED today.  She denies any chest pain.  She has had some chills today but no documented fever.  She denies nausea, vomiting, diarrhea.  She also states that she is having back pain across her bra line that she believes is from coughing.  She said it only hurts when she tries to take a deep breath, therefore is causing her to breathe shallowly.     Daily update:      8/22/2023: The patient is frustrated that she is not getting any better and has been treated over the last 12 days.  The patient reports that she has not officially been diagnosed through pulmonary functions test with COPD but has been treated by her primary care provider.  The patient does not like to take breathing treatments because they make her heart rate go up and her blood pressure go up and thinks that that is interfering with her Graves' disease.  The patient is on propranolol and Topazole chronically for Graves' disease.  I discussed with the patient that in order to get her oxygen demand down we will have to do something to open the airways.  We will restart steroids and recommend DuoNebs every 4 hours x 2.  Patient is satting approximately 88 to 90% on room air while sitting in bed.  She desats with any activity     Progress Note 8/21/23:  Patient reports she feels a bit better today. She is still having pain in her upper back with cough. She is still requiring 4LNC, she is not on home O2. She denies any cough or fever. She denies any N/V/C/D. She denies any new symptoms. Will continue with ABX and O2 as needed.            Subjective:  Symptoms:  Stable.  She reports shortness of  "breath.    Diet:  Poor intake.  No nausea.    Activity level: Normal.    Pain:  She reports no pain.          Objective       Vitals:   Temp:  [97.4 øF (36.3 øC)-100 øF (37.8 øC)] 99.8 øF (37.7 øC)  Heart Rate:  [] 68  Resp:  [12-22] 12  BP: (106-160)/(44-61) 160/46  Flow (L/min):  [3-4] 3  Objective:  General Appearance:  Comfortable, well-appearing, in no acute distress and not in pain (Cachectic appearance).    Vital signs: (most recent): Blood pressure 105/60, pulse 95, temperature 97.9 øF (36.6 øC), temperature source Oral, resp. rate 19, height 165.1 cm (65\"), weight 38.5 kg (84 lb 14 oz), SpO2 90 %.  Vital signs are normal.  No fever.    Output: Producing urine.    HEENT: Normal HEENT exam.    Lungs:  Normal effort.  She is not in respiratory distress.  There are wheezes.  No decreased breath sounds.    Heart: Normal rate.  No murmur.   Abdomen: Abdomen is soft.  Bowel sounds are normal.   There is generalized tenderness.     Extremities: Normal range of motion.    Pulses: Distal pulses are intact.    Neurological: Patient is alert and oriented to person, place and time.    Pupils:  Pupils are equal, round, and reactive to light.    Skin:  Warm and dry.                                       Result Review       Result Review:  I have personally reviewed the results from the time of this admission to 8/22/2023 10:28 EDT and agree with these findings:  [x]  Laboratory  [x]  Microbiology  []  Radiology  []  EKG/Telemetry   []  Cardiology/Vascular   []  Pathology  [x]  Old records  []  Other:  Most notable findings include: Leukocytosis              Assessment & Plan          amLODIPine, 2.5 mg, Oral, Daily  [START ON 8/24/2023] atorvastatin, 10 mg, Oral, Once per day on Mon Thu  azithromycin, 500 mg, Intravenous, Q24H  budesonide-formoterol, 2 puff, Inhalation, BID - RT  cefTRIAXone, 2,000 mg, Intravenous, Q24H  enoxaparin, 40 mg, Subcutaneous, Q24H  ipratropium-albuterol, 3 mL, Nebulization, Q4H - " RT  methIMAzole, 5 mg, Oral, Once per day on Mon Thu  propranolol, 20 mg, Oral, BID  senna-docusate sodium, 2 tablet, Oral, BID  tiotropium bromide monohydrate, 2 puff, Inhalation, Daily - RT               Active Hospital Problems:       Active Hospital Problems     Diagnosis      **Pneumonia        Plan:      Acute shortness of breath with hypoxic respiratory failure requiring oxygen maintain saturation--likely multifactorial with community-acquired pneumonia superimposed on COPD: Oxygen support as needed     Pneumonia, community-acquired: Continue IV Rocephin; continue IV azithromycin; incentive spirometry  -Check procalcitonin in a.m.  -Procalcitonin 8/16/2023 was less than 0.02  -MRSA probe - 8/21/2023  -Respiratory viral panel - 8/20/2023 and 8/16/2023  -Blood cultures pending     COPD without prior LFT, current wheezing: Patient would benefit from pulmonary consult at discharge; add Spiriva; continue Symbicort; add methylprednisone  -Patient is on Trelegy Ellipta at home  -Patient reports albuterol gives her palpitations  -Patient may have benefit from pulmonary consult at discharge     Hypertension, chronic: Continue amlodipine 2.5 mg daily     Hypothyroidism, chronic: Continue Tapazole; continue propranolol     HLD, chronic: Continue atorvastatin     Underweight--likely multifactorial with COPD and hypothyroidism     DVT prophylaxis:  Medical DVT prophylaxis orders are present.     CODE STATUS:    Code Status (Patient has no pulse and is not breathing): CPR (Attempt to Resuscitate)  Medical Interventions (Patient has pulse or is breathing): Full Support        Disposition:  I expect patient to be discharged tomorrow 8/23/2023 as long as oxygen saturation improves and patient able to wean off of O2..     Plan of care discussed with patient, primary nursing.     This patient has been examined wearing appropriate Personal Protective Equipment   Electronically signed by JEFF Snow, 08/22/23, 10:28  EDT.  [facility]                              Cosigned by: Paul Mayers MD at 08/22/23 8908     Revision History

## 2023-08-24 NOTE — PAYOR COMM NOTE
"Discharge notification for case# [id number]     =============    THANK YOU,    DELORES Woodall, RN  Utilization Review  [facility]  Phone: [phone]  Fax: [phone]      NPI: [id number]  Tax ID: [id number]        Huseyin Coles \"Huseyin Coles\" (63 y.o. Female)       Date of Birth   1960    Social Security Number       Address   [address] [place] [address] [place] [address]    Home Phone   [phone]    MRN   [id number]       Mormon   Jainism    Marital Status                               Admission Date   8/20/23    Admission Type   Emergency    Admitting Provider   Young Carr MD    Attending Provider       Department, Room/Bed   [facility] EMERGENCY DEPARTMENT, [facility]5/5       Discharge Date   8/23/2023    Discharge Disposition   Home or Self Care    Discharge Destination   Home                              Attending Provider: (none)   Allergies: Sulfa Antibiotics    Isolation: None   Infection: None   Code Status: Prior    Ht: 165.1 cm (65\")   Wt: 38.5 kg (84 lb 14 oz)    Admission Cmt: None   Principal Problem: Pneumonia [J18.9]                   Active Insurance as of 8/20/2023       Primary Coverage       Payor Plan Insurance Group Employer/Plan Group    [facility] [employer] [facility] dough [facility] PPO [id number]       Payor Plan Address Payor Plan Phone Number Payor Plan Fax Number Effective Dates    [address] [phone]  10/1/2018 - None Entered    [place] [address]         Subscriber Name Subscriber Birth Date Member ID       HUSEYIN COLES 1960 [id number]                     Emergency Contacts        (Rel.) Home Phone Work Phone Mobile Phone    GLENYS STODDARD (Son) -- -- [phone]    PRISCILLA STODDARD (Relative) -- -- --                 Discharge Summary        Desire Roldan APRN at 08/23/23 [address] [place] [address] Medicine Services  Discharge Summary    Date of Service: 8/23/2023  Patient Name: " Justa Coles  : 1960  MRN: [id number]    Date of Admission: 2023  Date of Discharge: 2023    Discharge Diagnosis: Community-acquired pneumonia; acute exacerbation COPD    Primary Care Physician: Michael Cho MD      Presenting Problem:   Pneumonia [J18.9]    Active and Resolved Hospital Problems:  Active Hospital Problems    Diagnosis POA    **Pneumonia [J18.9] Yes      Resolved Hospital Problems   No resolved problems to display.         Hospital Course     Hospital Course:  Justa Coles is a 63 y.o. female who presented to [facility] on 2023 complaining of shortness of breath that has been present over the past 8 days and worsening over the past couple of days.  She was diagnosed with a bronchitis on 2023 at an urgent care and was discharged with 7 days of doxycycline and a Medrol Dosepak.  Her symptoms did not improve so she presented to our emergency department on 2023 where she was diagnosed with a COPD exacerbation and was started on prednisone and discharged home.  Patient states she was feeling better until a couple of days ago.  She is not normally on oxygen but has required supplemental oxygen by nasal cannula since arrival to the ED today.  She denies any chest pain.  She has had some chills today but no documented fever.  She denies nausea, vomiting, diarrhea.  She also states that she is having back pain across her bra line that she believes is from coughing.  She said it only hurts when she tries to take a deep breath, therefore is causing her to breathe shallowly.      Date of discharge: The patient had improvement of her breathing but continued to desat when off oxygen.  I suspect that the patient has further advanced COPD than she realizes and has been compensating for a low oxygen level although this cannot be confirmed.  The patient improved after starting steroids and convincing her to take breathing treatments even though they increase her  heart rate temporarily.  The patient does not feel short of breath when her oxygen saturation is 88%.  She had walking oximetry with recommendation for 2 L per nasal cannula with exertion.  Ambulatory referral to pulmonology was placed and the patient was encouraged to follow-up and have pulmonary functions test completed.  The patient will be discharged on tapering dose of steroids over 12 days since she had recent steroid treatment as an outpatient with return of respiratory symptoms in less than 2 weeks.  The patient will be discharged on cefdinir and azithromycin to complete antibiotic course for community-acquired pneumonia.  Patient was agreeable to the plan and eager for discharge.           DISCHARGE Follow Up Recommendations for labs and diagnostics:     Patient needs follow-up PFTs, monitor referral.      Reasons For Change In Medications and Indications for New Medications:     Azithromycin and cefdinir to complete antibiotic course for community-acquired pneumonia.  Weaning dose of prednisone for acute exacerbation COPD.  Xopenex for breathing treatment.      Day of Discharge     Vital Signs:  Temp:  [97.9 øF (36.6 øC)-98.7 øF (37.1 øC)] 98 øF (36.7 øC)  Heart Rate:  [58-97] 96  Resp:  [14-24] 22  BP: (105-134)/(48-70) 108/51  Flow (L/min):  [2.5-4] 3    Physical Exam:  Physical Exam  Vitals and nursing note reviewed.   Constitutional:       Appearance: Normal appearance. She is cachectic.   HENT:      Head: Normocephalic and atraumatic.      Right Ear: External ear normal.      Left Ear: External ear normal.      Nose: Nose normal.      Mouth/Throat:      Mouth: Mucous membranes are moist.   Eyes:      General: No scleral icterus.        Right eye: No discharge.         Left eye: No discharge.      Extraocular Movements: Extraocular movements intact.      Conjunctiva/sclera: Conjunctivae normal.      Pupils: Pupils are equal, round, and reactive to light.   Cardiovascular:      Rate and Rhythm: Normal  rate and regular rhythm.      Pulses: Normal pulses.      Heart sounds: Normal heart sounds. No murmur heard.  Pulmonary:      Effort: Pulmonary effort is normal. No respiratory distress.      Breath sounds: No stridor. Wheezing present. No rhonchi.   Chest:      Chest wall: No tenderness.   Abdominal:      General: Abdomen is flat. Bowel sounds are normal.      Palpations: Abdomen is soft.   Musculoskeletal:         General: Normal range of motion.      Cervical back: Normal range of motion and neck supple.      Right lower leg: No edema.      Left lower leg: No edema.   Skin:     General: Skin is warm and dry.      Capillary Refill: Capillary refill takes less than 2 seconds.      Coloration: Skin is not jaundiced or pale.   Neurological:      General: No focal deficit present.      Mental Status: She is alert and oriented to person, place, and time.   Psychiatric:         Mood and Affect: Mood normal.         Behavior: Behavior is cooperative.         Thought Content: Thought content normal.         Judgment: Judgment normal.          Pertinent  and/or Most Recent Results     LAB RESULTS:      Lab 08/23/23  0131 08/22/23  1326 08/21/23 2309 08/21/23  0406 08/20/23 2108 08/20/23 2052   WBC 13.20* 19.50* 21.80* 14.80*  --  19.80*   HEMOGLOBIN 12.3 12.3 12.7 14.6  --  14.4   HEMATOCRIT 38.0 37.5 39.1 44.4  --  44.8   PLATELETS 294 263 281 348  --  442   NEUTROS ABS 11.62* 17.10* 18.31* 12.88*  --  16.43*   LYMPHS ABS  --  0.80  --   --   --   --    MONOS ABS  --  1.20*  --   --   --   --    EOS ABS  --  0.30  --   --   --   --    MCV 91.4 90.9 91.1 90.8  --  91.5   PROCALCITONIN 2.10*  --   --   --   --   --    LACTATE  --   --   --   --  1.9  --          Lab 08/23/23  0131 08/21/23  2309 08/21/23  0406 08/20/23  2154 08/20/23 2052   SODIUM 141 138 135* 142  --    POTASSIUM 4.5 5.2 4.6 4.3  --    CHLORIDE 102 101 98 105  --    CO2 30.0* 31.0* 25.0 27.0  --    ANION GAP 9.0 6.0 12.0 10.0  --    BUN 26* 29* 24* 22   --    CREATININE 0.49* 0.50* 0.50* 0.42*  --    EGFR 105.4 104.9 104.9 109.4  --    GLUCOSE 172* 138* 188* 156*  --    CALCIUM 9.3 9.0 8.6 8.4*  --    MAGNESIUM  --   --   --  2.7*  --    HEMOGLOBIN A1C  --   --   --   --  5.70*         Lab 08/20/23  2154   TOTAL PROTEIN 6.1   ALBUMIN 3.6   GLOBULIN 2.5   ALT (SGPT) 22   AST (SGOT) 17   BILIRUBIN 0.5   ALK PHOS 80                     Lab 08/20/23 2126   PH, ARTERIAL 7.429   PCO2, ARTERIAL 38.4   PO2 ART 74.5*   O2 SATURATION ART 95.2   FIO2 36   HCO3 ART 25.4   BASE EXCESS ART 1.2     Brief Urine Lab Results       None          Microbiology Results (last 10 days)       Procedure Component Value - Date/Time    MRSA Screen, PCR (Inpatient) - Swab, Nares [[id number]]  (Normal) Collected: 08/21/23 0310    Lab Status: Final result Specimen: Swab from Nares Updated: 08/21/23 0438     MRSA PCR No MRSA Detected    Narrative:      The negative predictive value of this diagnostic test is high and should only be used to consider de-escalating anti-MRSA therapy. A positive result may indicate colonization with MRSA and must be correlated clinically.    Respiratory Panel PCR w/COVID-19(SARS-CoV-2) LAURA/AARTI/ITA/PAD/COR/MAD/IRMA In-House, NP Swab in UTM/VTM, 3-4 HR TAT - Swab, Nasopharynx [[id number]]  (Normal) Collected: 08/20/23 2146    Lab Status: Final result Specimen: Swab from Nasopharynx Updated: 08/20/23 2238     ADENOVIRUS, PCR Not Detected     Coronavirus 229E Not Detected     Coronavirus HKU1 Not Detected     Coronavirus NL63 Not Detected     Coronavirus OC43 Not Detected     COVID19 Not Detected     Human Metapneumovirus Not Detected     Human Rhinovirus/Enterovirus Not Detected     Influenza A PCR Not Detected     Influenza B PCR Not Detected     Parainfluenza Virus 1 Not Detected     Parainfluenza Virus 2 Not Detected     Parainfluenza Virus 3 Not Detected     Parainfluenza Virus 4 Not Detected     RSV, PCR Not Detected     Bordetella pertussis pcr Not Detected      Bordetella parapertussis PCR Not Detected     Chlamydophila pneumoniae PCR Not Detected     Mycoplasma pneumo by PCR Not Detected    Narrative:      In the setting of a positive respiratory panel with a viral infection PLUS a negative procalcitonin without other underlying concern for bacterial infection, consider observing off antibiotics or discontinuation of antibiotics and continue supportive care. If the respiratory panel is positive for atypical bacterial infection (Bordetella pertussis, Chlamydophila pneumoniae, or Mycoplasma pneumoniae), consider antibiotic de-escalation to target atypical bacterial infection.    Blood Culture - Blood, Arm, Left [[id number]]  (Normal) Collected: 08/20/23 2114    Lab Status: Preliminary result Specimen: Blood from Arm, Left Updated: 08/22/23 2132     Blood Culture No growth at 2 days    Blood Culture - Blood, Arm, Left [[id number]]  (Normal) Collected: 08/20/23 2114    Lab Status: Preliminary result Specimen: Blood from Arm, Left Updated: 08/22/23 2132     Blood Culture No growth at 2 days    Respiratory Panel PCR w/COVID-19(SARS-CoV-2) LAURA/AARTI/ITA/PAD/COR/MAD/IRMA In-House, NP Swab in UTM/VTM, 3-4 HR TAT - Swab, Nasopharynx [[id number]]  (Normal) Collected: 08/16/23 0638    Lab Status: Final result Specimen: Swab from Nasopharynx Updated: 08/16/23 0804     ADENOVIRUS, PCR Not Detected     Coronavirus 229E Not Detected     Coronavirus HKU1 Not Detected     Coronavirus NL63 Not Detected     Coronavirus OC43 Not Detected     COVID19 Not Detected     Human Metapneumovirus Not Detected     Human Rhinovirus/Enterovirus Not Detected     Influenza A PCR Not Detected     Influenza B PCR Not Detected     Parainfluenza Virus 1 Not Detected     Parainfluenza Virus 2 Not Detected     Parainfluenza Virus 3 Not Detected     Parainfluenza Virus 4 Not Detected     RSV, PCR Not Detected     Bordetella pertussis pcr Not Detected     Bordetella parapertussis PCR Not Detected     Chlamydophila  pneumoniae PCR Not Detected     Mycoplasma pneumo by PCR Not Detected    Narrative:      In the setting of a positive respiratory panel with a viral infection PLUS a negative procalcitonin without other underlying concern for bacterial infection, consider observing off antibiotics or discontinuation of antibiotics and continue supportive care. If the respiratory panel is positive for atypical bacterial infection (Bordetella pertussis, Chlamydophila pneumoniae, or Mycoplasma pneumoniae), consider antibiotic de-escalation to target atypical bacterial infection.    Blood Culture - Blood, Arm, Right [[id number]]  (Normal) Collected: 08/16/23 0636    Lab Status: Final result Specimen: Blood from Arm, Right Updated: 08/21/23 0701     Blood Culture No growth at 5 days    Blood Culture - Blood, Arm, Left [[id number]]  (Normal) Collected: 08/16/23 0636    Lab Status: Final result Specimen: Blood from Arm, Left Updated: 08/21/23 0701     Blood Culture No growth at 5 days            XR Chest 2 View    Result Date: 8/11/2023  Impression: Impression: 1. Subtle nodular densities bilaterally. Recommend nonemergent chest CT. Electronically Signed: Tomi Perez MD  8/11/2023 1:08 PM EDT  Workstation ID: [id number]    XR Chest 1 View    Result Date: 8/20/2023  Impression: Impression: Interval development of mild patchy airspace disease in the medial right lower lobe which could be from atelectasis or developing pneumonia. There are stable interstitial and emphysematous changes. Electronically Signed: Nilesh Fairbanks MD  8/20/2023 9:37 PM EDT  Workstation ID: [id number]    XR Chest 1 View    Result Date: 8/16/2023  Impression: Impression: No acute cardiopulmonary process. Stable chronic interstitial changes and scarring. Electronically Signed: Becki Estrada MD  8/16/2023 6:18 AM EDT  Workstation ID: [id number]                 Labs Pending at Discharge:  Pending Labs       Order Current Status    Blood Culture - Blood, Arm, Left Preliminary  result    Blood Culture - Blood, Arm, Left Preliminary result            Procedures Performed           Consults:   Consults       Date and Time Order Name Status Description    8/20/2023 10:42 PM Hospitalist (on-call MD unless specified)                Discharge Details        Discharge Medications        New Medications        Instructions Start Date   azithromycin 250 MG tablet  Commonly known as: Zithromax   Start tomorrow 8/24/2023, Take 1 tablet by mouth daily for 3 days.      cefdinir 300 MG capsule  Commonly known as: OMNICEF   300 mg, Oral, 2 Times Daily, Start tomorrow morning      predniSONE 20 MG tablet  Commonly known as: DELTASONE   40 mg, Oral, Daily With Breakfast   Start Date: August 24, 2023     predniSONE 10 MG tablet  Commonly known as: DELTASONE   Take 4 tablets by mouth Daily for 2 days, THEN 3 tablets Daily for 3 days, THEN 2 tablets Daily for 3 days, THEN 1 tablet Daily for 3 days.   Start Date: August 26, 2023     predniSONE 20 MG tablet  Commonly known as: DELTASONE   20 mg, Oral, Daily   Start Date: August 29, 2023     predniSONE 10 MG tablet  Commonly known as: DELTASONE   10 mg, Oral, Daily   Start Date: September 1, 2023            Continue These Medications        Instructions Start Date   albuterol sulfate  (90 Base) MCG/ACT inhaler  Commonly known as: PROVENTIL HFA;VENTOLIN HFA;PROAIR HFA   2 puffs, Inhalation, Every 6 Hours PRN      amLODIPine 2.5 MG tablet  Commonly known as: NORVASC   2.5 mg, Oral, Daily      atorvastatin 10 MG tablet  Commonly known as: LIPITOR   10 mg, Oral, 2 Times Weekly, Monday and Thursdays       budesonide 1 MG/2ML nebulizer solution  Commonly known as: PULMICORT   Take 2 mL by nebulization Daily.      levalbuterol 1.25 MG/3ML nebulizer solution  Commonly known as: XOPENEX   Take 1 ampule by nebulization Every 4 (Four) Hours.      levalbuterol 45 MCG/ACT inhaler  Commonly known as: XOPENEX HFA   1-2 puffs, Inhalation, Every 6 Hours PRN       methIMAzole 5 MG tablet  Commonly known as: TAPAZOLE   5 mg, Oral, 2 Times Weekly, Mondays and Thursdays       propranolol 20 MG tablet  Commonly known as: INDERAL   20 mg, Oral, 2 Times Daily      Trelegy Ellipta 200-62.5-25 MCG/ACT aerosol powder   Generic drug: Fluticasone-Umeclidin-Vilant   1 puff, Inhalation, Daily               Allergies   Allergen Reactions    Sulfa Antibiotics Hives         Discharge Disposition:     Stable; home, self-care      Diet:  Hospital:  Diet Order   Procedures    Diet: Regular/House Diet; Texture: Regular Texture (IDDSI 7); Fluid Consistency: Thin (IDDSI 0)         Discharge Activity: As tolerated        CODE STATUS:  Code Status and Medical Interventions:   Ordered at: 08/21/23 0158     Code Status (Patient has no pulse and is not breathing):    CPR (Attempt to Resuscitate)     Medical Interventions (Patient has pulse or is breathing):    Full Support         Future Appointments   Date Time Provider Department Center   2/5/2024 11:15 AM Oscar Bhandari MD [facility]           Time spent on Discharge including face to face service:  35 minutes    Patient was examined using appropriate personal protective equipment    Signature: Electronically signed by JEFF Snow, 08/23/23, 14:53 EDT.  [facility]ist Team    Electronically signed by Desire Roldan APRN at 08/23/23 2108       Discharge Order (From admission, onward)       Start     Ordered    08/23/23 1534  Discharge patient  Once        Expected Discharge Date: 08/23/23   Discharge Disposition: Home or Self Care   Physician of Record for Attribution - Please select from Treatment Team: ALEC QUEVEDO [[id number]]   Review needed by CMO to determine Physician of Record: No   Please choose which facility the patient is currently admitted if they are being discharged to another facility or unit.: [facility]   Mode: Notify patient to arrange transport      Question Answer Comment   Physician of Record for Attribution -  Please select from Treatment Team ALEC QUEVEDO    Review needed by CMO to determine Physician of Record No    Please choose which facility the patient is currently admitted if they are being discharged to another facility or unit. [facility]    Mode: Notify patient to arrange transport        08/23/23 1117

## 2023-08-24 NOTE — OUTREACH NOTE
Prep Survey      Flowsheet Row Responses   [facility] facility patient discharged from? [facility]   Is LACE score < 7 ? No   Eligibility Readm Mgmt   Discharge diagnosis **Pneumonia   Does the patient have one of the following disease processes/diagnoses(primary or secondary)? Pneumonia   Does the patient have Home health ordered? No   Is there a DME ordered? Yes   What DME was ordered? [facility]   Prep survey completed? Yes            AMISHA MCCLOUD - Registered Nurse

## 2023-08-25 LAB
BACTERIA SPEC AEROBE CULT: NORMAL
BACTERIA SPEC AEROBE CULT: NORMAL

## 2023-08-30 ENCOUNTER — READMISSION MANAGEMENT (OUTPATIENT)
Dept: CALL CENTER | Facility: HOSPITAL | Age: 63
End: 2023-08-30
Payer: COMMERCIAL

## 2023-08-30 NOTE — PROGRESS NOTES
"Enter Query Response Below      Query Response: Sepsis ruled out              If applicable, please update the problem list.     Patient: Justa Coles \"Justa Coles\"        : 1960  Account: [id number]           Admit Date: 2023        How to Respond to this query:       a. Click New Note     b. Answer query within the yellow box.                c. Update the Problem List, if applicable.      If you have any questions about this query contact me at: [phone]     Desire Hillt, APRN:    63 year old female with COPD presented with shortness of breath and found to have pneumonia and COPD exacerbation.  Vitals on admission B/P 159/84, , resp 40, sp02 91%.  Labs include WBC 19.80, lactate 1.9, procalcitonin 2.10.  ED note includes sepsis.  Patient given azithromycin IV -, rocephin IV -.  No further documentation of sepsis noted.     Please clarify the following:    Sepsis ruled in  Sepsis ruled out  Other- specify______  Unable to determine      By submitting this query, we are merely seeking further clarification of documentation to accurately reflect all conditions that you are monitoring, evaluating, treating or that extend the hospitalization or utilize additional resources of care. Please utilize your independent clinical judgment when addressing the question(s) above.     This query and your response, once completed, will be entered into the legal medical record.    Sincerely,  Kirsten Jackson RN, MSN  Clinical Documentation Integrity Program   [email or web address]     "

## 2023-08-30 NOTE — PROGRESS NOTES
"Enter Query Response Below      Query Response: Graves disease, chronic, controlled             If applicable, please update the problem list.     Patient: Justa Coles \"Justa Coles\"        : 1960  Account: [id number]           Admit Date: 2023        How to Respond to this query:       a. Click New Note     b. Answer query within the yellow box.                c. Update the Problem List, if applicable.      If you have any questions about this query contact me at: [phone]     Desire Jamar, APRN:    63 year old female with COPD presented with shortness of breath and found to have pneumonia and COPD exacerbation.  Progress notes include hypothyroidism.  H&P includes past medical history of Graves disease.  Home medications include propranolol and methimazole.  Labs include free T4 1.24.     Can this be further clarified as:    - hypothyroidism  - Graves disease  - other (specify) _______  - unable to determine     By submitting this query, we are merely seeking further clarification of documentation to accurately reflect all conditions that you are monitoring, evaluating, treating or that extend the hospitalization or utilize additional resources of care. Please utilize your independent clinical judgment when addressing the question(s) above.     This query and your response, once completed, will be entered into the legal medical record.    Sincerely,  Kirsten Jackson RN, MSN  Clinical Documentation Integrity Program   [email or web address]     "

## 2023-08-30 NOTE — OUTREACH NOTE
COPD/PN Week 1 Survey      Flowsheet Row Responses   [facility] patient discharged from? [facility]   Does the patient have one of the following disease processes/diagnoses(primary or secondary)? Pneumonia   Week 1 attempt successful? Yes   Call start time 1132   Call end time 1136   Discharge diagnosis **Pneumonia   Person spoke with today (if not patient) and relationship Patient   Meds reviewed with patient/caregiver? Yes   Does the patient have all medications ordered at discharge? Yes   Is the patient taking all medications as directed (includes completed medication regime)? Yes   Does the patient have a primary care provider?  Yes   Does the patient have an appointment with their PCP or specialist within 7 days of discharge? Yes   Comments regarding PCP patient reports that she has already had a f/u appt yesteday   Has the patient kept scheduled appointments due by today? Yes   Has home health visited the patient within 72 hours of discharge? N/A   Has all DME been delivered? No   DME interventions Other  [Offered to contact DME co on patients behalf, she declined. She state that she does not need the home O2]   Pulse Ox monitoring Intermittent   Pulse Ox device source Patient   O2 Sat comments Patient reports O2 sat in mid 90's on room air   O2 Sat: education provided Sat levels, Monitoring frequency, When to seek care   Psychosocial issues? No   Did the patient receive a copy of their discharge instructions? Yes   Nursing interventions Reviewed instructions with patient   If the patient is a current smoker, are they able to teach back resources for cessation? Not a smoker   Is the patient/caregiver able to teach back the hierarchy of who to call/visit for symptoms/problems? PCP, Specialist, Home health nurse, Urgent Care, ED, 911 Yes   Is the patient/caregiver able to teach back signs and symptoms of worsening condition: Fever/chills, Shortness of breath, Chest pain   Is the patient/caregiver able to teach  back importance of completing antibiotic course of treatment? Yes   Week 1 call completed? Yes   Graduated Yes   Is the patient interested in additional calls from an ambulatory ? No   Graduated/Revoked comments Patient reports that she is doing well. Denies any questions or needs today. Has already had a f/u appt. Goals met. No further calls.   Call end time 0322            DONNIE LEUNG - Registered Nurse

## 2023-09-13 ENCOUNTER — TELEPHONE (OUTPATIENT)
Dept: ENDOCRINOLOGY | Facility: CLINIC | Age: 63
End: 2023-09-13

## 2023-09-13 NOTE — TELEPHONE ENCOUNTER
"    Caller: Justa Coles \"Justa Coles\"    Relationship to patient: Self    Best call back number:[phone]      Patient is needing: PT CALLED IN WANTING TO KNOW IF DR. DE LA TORRE WANTS TO DO BLOOD WORK SOONER. PT STATED THAT HER BLOOD PRESSURE HAS BEEN UP FOR A WHILE. PLEASE ADVISE .      "

## 2023-09-15 NOTE — TELEPHONE ENCOUNTER
I called & left msg for pt:  She had a Free T4 level done 3 wks ago on 8/21 & it was normal @ 1.24.  So, no, she does not need another thyroid test.  Consider seeing PCP, since hyperthyroid doesn't cause BP to increase.

## 2023-09-18 RX ORDER — METHIMAZOLE 5 MG/1
TABLET ORAL
Qty: 90 TABLET | Refills: 1 | Status: SHIPPED | OUTPATIENT
Start: 2023-09-18

## 2024-01-01 ENCOUNTER — HOSPITAL ENCOUNTER (INPATIENT)
Facility: HOSPITAL | Age: 64
LOS: 1 days | End: 2024-01-18
Attending: EMERGENCY MEDICINE | Admitting: INTERNAL MEDICINE
Payer: COMMERCIAL

## 2024-01-01 ENCOUNTER — APPOINTMENT (OUTPATIENT)
Dept: GENERAL RADIOLOGY | Facility: HOSPITAL | Age: 64
End: 2024-01-01
Payer: COMMERCIAL

## 2024-01-01 ENCOUNTER — APPOINTMENT (OUTPATIENT)
Dept: CT IMAGING | Facility: HOSPITAL | Age: 64
End: 2024-01-01
Payer: COMMERCIAL

## 2024-01-01 VITALS
BODY MASS INDEX: 16.86 KG/M2 | OXYGEN SATURATION: 100 % | HEIGHT: 64 IN | DIASTOLIC BLOOD PRESSURE: 56 MMHG | TEMPERATURE: 97.6 F | SYSTOLIC BLOOD PRESSURE: 100 MMHG | RESPIRATION RATE: 26 BRPM | HEART RATE: 56 BPM | WEIGHT: 98.77 LBS

## 2024-01-01 DIAGNOSIS — J96.01 SEPSIS WITH ACUTE HYPOXIC RESPIRATORY FAILURE AND SEPTIC SHOCK, DUE TO UNSPECIFIED ORGANISM: ICD-10-CM

## 2024-01-01 DIAGNOSIS — R65.21 SEPSIS WITH ACUTE HYPOXIC RESPIRATORY FAILURE AND SEPTIC SHOCK, DUE TO UNSPECIFIED ORGANISM: ICD-10-CM

## 2024-01-01 DIAGNOSIS — R07.9 CHEST PAIN, UNSPECIFIED TYPE: ICD-10-CM

## 2024-01-01 DIAGNOSIS — D72.825 BANDEMIA: ICD-10-CM

## 2024-01-01 DIAGNOSIS — J96.02 ACUTE RESPIRATORY FAILURE WITH HYPERCAPNIA: ICD-10-CM

## 2024-01-01 DIAGNOSIS — R06.00 DYSPNEA, UNSPECIFIED TYPE: ICD-10-CM

## 2024-01-01 DIAGNOSIS — N28.9 RENAL INSUFFICIENCY: ICD-10-CM

## 2024-01-01 DIAGNOSIS — U07.1 COVID-19: ICD-10-CM

## 2024-01-01 DIAGNOSIS — J18.9 PNEUMONIA OF LEFT LUNG DUE TO INFECTIOUS ORGANISM, UNSPECIFIED PART OF LUNG: Primary | ICD-10-CM

## 2024-01-01 DIAGNOSIS — A41.9 SEPSIS WITH ACUTE HYPOXIC RESPIRATORY FAILURE AND SEPTIC SHOCK, DUE TO UNSPECIFIED ORGANISM: ICD-10-CM

## 2024-01-01 DIAGNOSIS — R50.9 FEVER AND CHILLS: ICD-10-CM

## 2024-01-01 DIAGNOSIS — B34.8 PARAINFLUENZA: ICD-10-CM

## 2024-01-01 LAB
ALBUMIN SERPL-MCNC: 3.1 G/DL (ref 3.5–5.2)
ALBUMIN/GLOB SERPL: 1.1 G/DL
ALP SERPL-CCNC: 95 U/L (ref 39–117)
ALT SERPL W P-5'-P-CCNC: 48 U/L (ref 1–33)
ANION GAP SERPL CALCULATED.3IONS-SCNC: 16 MMOL/L (ref 5–15)
APTT PPP: 35 SECONDS (ref 24–31)
ARTERIAL PATENCY WRIST A: ABNORMAL
ARTERIAL PATENCY WRIST A: POSITIVE
ARTERIAL PATENCY WRIST A: POSITIVE
AST SERPL-CCNC: 30 U/L (ref 1–32)
ATMOSPHERIC PRESS: ABNORMAL MM[HG]
B PARAPERT DNA SPEC QL NAA+PROBE: NOT DETECTED
B PERT DNA SPEC QL NAA+PROBE: NOT DETECTED
BACTERIA BLD CULT: ABNORMAL
BASE EXCESS BLDA CALC-SCNC: -14 MMOL/L (ref 0–3)
BASE EXCESS BLDA CALC-SCNC: -17.8 MMOL/L (ref 0–3)
BASE EXCESS BLDA CALC-SCNC: -8.3 MMOL/L (ref 0–3)
BASOPHILS # BLD MANUAL: 0.13 10*3/MM3 (ref 0–0.2)
BASOPHILS NFR BLD MANUAL: 2 % (ref 0–1.5)
BDY SITE: ABNORMAL
BILIRUB SERPL-MCNC: 0.6 MG/DL (ref 0–1.2)
BOTTLE TYPE: ABNORMAL
BUN SERPL-MCNC: 40 MG/DL (ref 8–23)
BUN/CREAT SERPL: 24.1 (ref 7–25)
C PNEUM DNA NPH QL NAA+NON-PROBE: NOT DETECTED
CA-I BLDA-SCNC: 1.16 MMOL/L (ref 1.15–1.33)
CALCIUM SPEC-SCNC: 8.7 MG/DL (ref 8.6–10.5)
CHLORIDE SERPL-SCNC: 102 MMOL/L (ref 98–107)
CO2 BLDA-SCNC: 18.3 MMOL/L (ref 22–29)
CO2 BLDA-SCNC: 19.1 MMOL/L (ref 22–29)
CO2 SERPL-SCNC: 19 MMOL/L (ref 22–29)
CREAT BLDA-MCNC: 1.11 MG/DL (ref 0.6–1.3)
CREAT SERPL-MCNC: 1.66 MG/DL (ref 0.57–1)
D DIMER PPP FEU-MCNC: 2.54 MG/L (FEU) (ref 0–0.64)
D-LACTATE SERPL-SCNC: 3.4 MMOL/L (ref 0.2–2)
D-LACTATE SERPL-SCNC: 4.4 MMOL/L (ref 0.3–2)
D-LACTATE SERPL-SCNC: 4.4 MMOL/L (ref 0.5–2)
DEPRECATED RDW RBC AUTO: 51.6 FL (ref 37–54)
EGFRCR SERPLBLD CKD-EPI 2021: 34.3 ML/MIN/1.73
EGFRCR SERPLBLD CKD-EPI 2021: 55.6 ML/MIN/1.73
EOSINOPHIL # BLD MANUAL: 0.06 10*3/MM3 (ref 0–0.4)
EOSINOPHIL NFR BLD MANUAL: 1 % (ref 0.3–6.2)
ERYTHROCYTE [DISTWIDTH] IN BLOOD BY AUTOMATED COUNT: 14.7 % (ref 12.3–15.4)
FLUAV SUBTYP SPEC NAA+PROBE: NOT DETECTED
FLUBV RNA ISLT QL NAA+PROBE: NOT DETECTED
GEN 5 2HR TROPONIN T REFLEX: 27 NG/L
GLOBULIN UR ELPH-MCNC: 2.9 GM/DL
GLUCOSE BLDC GLUCOMTR-MCNC: 119 MG/DL (ref 70–105)
GLUCOSE BLDC GLUCOMTR-MCNC: 123 MG/DL (ref 74–100)
GLUCOSE BLDC GLUCOMTR-MCNC: 123 MG/DL (ref 74–100)
GLUCOSE BLDC GLUCOMTR-MCNC: 82 MG/DL (ref 70–105)
GLUCOSE SERPL-MCNC: 132 MG/DL (ref 65–99)
HADV DNA SPEC NAA+PROBE: NOT DETECTED
HCO3 BLDA-SCNC: 16.6 MMOL/L (ref 21–28)
HCO3 BLDA-SCNC: 17.2 MMOL/L (ref 21–28)
HCO3 BLDA-SCNC: 22.5 MMOL/L (ref 21–28)
HCOV 229E RNA SPEC QL NAA+PROBE: NOT DETECTED
HCOV HKU1 RNA SPEC QL NAA+PROBE: NOT DETECTED
HCOV NL63 RNA SPEC QL NAA+PROBE: NOT DETECTED
HCOV OC43 RNA SPEC QL NAA+PROBE: NOT DETECTED
HCT VFR BLD AUTO: 43.2 % (ref 34–46.6)
HCT VFR BLDA CALC: 42 % (ref 38–51)
HEMODILUTION: NO
HGB BLD-MCNC: 13.8 G/DL (ref 12–15.9)
HGB BLDA-MCNC: 14.1 G/DL (ref 12–17)
HMPV RNA NPH QL NAA+NON-PROBE: NOT DETECTED
HPIV1 RNA ISLT QL NAA+PROBE: NOT DETECTED
HPIV2 RNA SPEC QL NAA+PROBE: NOT DETECTED
HPIV3 RNA NPH QL NAA+PROBE: DETECTED
HPIV4 P GENE NPH QL NAA+PROBE: NOT DETECTED
INHALED O2 CONCENTRATION: 100 %
INHALED O2 CONCENTRATION: 100 %
INHALED O2 CONCENTRATION: 40 %
INR PPP: 1.52 (ref 0.93–1.1)
LYMPHOCYTES # BLD MANUAL: 0.96 10*3/MM3 (ref 0.7–3.1)
LYMPHOCYTES NFR BLD MANUAL: 17 % (ref 5–12)
M PNEUMO IGG SER IA-ACNC: NOT DETECTED
MAGNESIUM SERPL-MCNC: 1.5 MG/DL (ref 1.6–2.4)
MCH RBC QN AUTO: 29.9 PG (ref 26.6–33)
MCHC RBC AUTO-ENTMCNC: 31.9 G/DL (ref 31.5–35.7)
MCV RBC AUTO: 93.5 FL (ref 79–97)
METAMYELOCYTES NFR BLD MANUAL: 5 % (ref 0–0)
MODALITY: ABNORMAL
MONOCYTES # BLD: 1.09 10*3/MM3 (ref 0.1–0.9)
MRSA DNA SPEC QL NAA+PROBE: ABNORMAL
MYELOCYTES NFR BLD MANUAL: 3 % (ref 0–0)
NEUTROPHILS # BLD AUTO: 3.65 10*3/MM3 (ref 1.7–7)
NEUTROPHILS NFR BLD MANUAL: 36 % (ref 42.7–76)
NEUTS BAND NFR BLD MANUAL: 21 % (ref 0–5)
NT-PROBNP SERPL-MCNC: 5544 PG/ML (ref 0–900)
PCO2 BLDA: 127.9 MM HG (ref 35–48)
PCO2 BLDA: 34.7 MM HG (ref 35–48)
PCO2 BLDA: 82.3 MM HG (ref 35–48)
PEEP RESPIRATORY: 5 CM[H2O]
PH BLDA: 6.85 PH UNITS (ref 7.35–7.45)
PH BLDA: 6.91 PH UNITS (ref 7.35–7.45)
PH BLDA: 7.3 PH UNITS (ref 7.35–7.45)
PLAT MORPH BLD: NORMAL
PLATELET # BLD AUTO: 283 10*3/MM3 (ref 140–450)
PMV BLD AUTO: 8.9 FL (ref 6–12)
PO2 BLDA: 118.1 MM HG (ref 83–108)
PO2 BLDA: 208.6 MM HG (ref 83–108)
PO2 BLDA: 82.1 MM HG (ref 83–108)
POTASSIUM BLDA-SCNC: 4.4 MMOL/L (ref 3.5–4.5)
POTASSIUM SERPL-SCNC: 3.8 MMOL/L (ref 3.5–5.2)
PROCALCITONIN SERPL-MCNC: 12.32 NG/ML (ref 0–0.25)
PROT SERPL-MCNC: 6 G/DL (ref 6–8.5)
PROTHROMBIN TIME: 16.1 SECONDS (ref 9.6–11.7)
QT INTERVAL: 257 MS
QTC INTERVAL: 406 MS
RBC # BLD AUTO: 4.62 10*6/MM3 (ref 3.77–5.28)
RBC MORPH BLD: NORMAL
RESPIRATORY RATE: 26
RHINOVIRUS RNA SPEC NAA+PROBE: NOT DETECTED
RSV RNA NPH QL NAA+NON-PROBE: NOT DETECTED
SAO2 % BLDCOA: 92.4 % (ref 94–98)
SAO2 % BLDCOA: 95 % (ref 94–98)
SAO2 % BLDCOA: 98.8 % (ref 94–98)
SARS-COV-2 RNA NPH QL NAA+NON-PROBE: DETECTED
SCAN SLIDE: NORMAL
SODIUM BLD-SCNC: 143 MMOL/L (ref 138–146)
SODIUM SERPL-SCNC: 137 MMOL/L (ref 136–145)
TROPONIN T DELTA: 12 NG/L
TROPONIN T SERPL HS-MCNC: 15 NG/L
VARIANT LYMPHS NFR BLD MANUAL: 15 % (ref 19.6–45.3)
VENTILATOR MODE: AC
VT ON VENT VENT: 500 ML
WBC MORPH BLD: NORMAL
WBC NRBC COR # BLD AUTO: 6.4 10*3/MM3 (ref 3.4–10.8)

## 2024-01-01 PROCEDURE — 85730 THROMBOPLASTIN TIME PARTIAL: CPT | Performed by: NURSE PRACTITIONER

## 2024-01-01 PROCEDURE — 82803 BLOOD GASES ANY COMBINATION: CPT

## 2024-01-01 PROCEDURE — 36600 WITHDRAWAL OF ARTERIAL BLOOD: CPT | Performed by: STUDENT IN AN ORGANIZED HEALTH CARE EDUCATION/TRAINING PROGRAM

## 2024-01-01 PROCEDURE — 31500 INSERT EMERGENCY AIRWAY: CPT | Performed by: STUDENT IN AN ORGANIZED HEALTH CARE EDUCATION/TRAINING PROGRAM

## 2024-01-01 PROCEDURE — 25810000003 SODIUM CHLORIDE 0.9 % SOLUTION: Performed by: NURSE PRACTITIONER

## 2024-01-01 PROCEDURE — 83605 ASSAY OF LACTIC ACID: CPT

## 2024-01-01 PROCEDURE — 85018 HEMOGLOBIN: CPT

## 2024-01-01 PROCEDURE — 25010000002 MAGNESIUM SULFATE IN D5W 1G/100ML (PREMIX) 1-5 GM/100ML-% SOLUTION: Performed by: INTERNAL MEDICINE

## 2024-01-01 PROCEDURE — 71045 X-RAY EXAM CHEST 1 VIEW: CPT

## 2024-01-01 PROCEDURE — 84484 ASSAY OF TROPONIN QUANT: CPT | Performed by: NURSE PRACTITIONER

## 2024-01-01 PROCEDURE — 25510000001 IOPAMIDOL PER 1 ML: Performed by: EMERGENCY MEDICINE

## 2024-01-01 PROCEDURE — 94799 UNLISTED PULMONARY SVC/PX: CPT

## 2024-01-01 PROCEDURE — 36415 COLL VENOUS BLD VENIPUNCTURE: CPT

## 2024-01-01 PROCEDURE — 94002 VENT MGMT INPAT INIT DAY: CPT

## 2024-01-01 PROCEDURE — 0202U NFCT DS 22 TRGT SARS-COV-2: CPT | Performed by: NURSE PRACTITIONER

## 2024-01-01 PROCEDURE — 25810000003 SEPSIS FLUID NS 0.9 % SOLUTION: Performed by: NURSE PRACTITIONER

## 2024-01-01 PROCEDURE — 85610 PROTHROMBIN TIME: CPT | Performed by: NURSE PRACTITIONER

## 2024-01-01 PROCEDURE — 84145 PROCALCITONIN (PCT): CPT | Performed by: NURSE PRACTITIONER

## 2024-01-01 PROCEDURE — 82803 BLOOD GASES ANY COMBINATION: CPT | Performed by: STUDENT IN AN ORGANIZED HEALTH CARE EDUCATION/TRAINING PROGRAM

## 2024-01-01 PROCEDURE — 82330 ASSAY OF CALCIUM: CPT

## 2024-01-01 PROCEDURE — 85007 BL SMEAR W/DIFF WBC COUNT: CPT | Performed by: NURSE PRACTITIONER

## 2024-01-01 PROCEDURE — 83880 ASSAY OF NATRIURETIC PEPTIDE: CPT | Performed by: NURSE PRACTITIONER

## 2024-01-01 PROCEDURE — 25010000002 CEFEPIME PER 500 MG: Performed by: NURSE PRACTITIONER

## 2024-01-01 PROCEDURE — 87147 CULTURE TYPE IMMUNOLOGIC: CPT | Performed by: NURSE PRACTITIONER

## 2024-01-01 PROCEDURE — 82948 REAGENT STRIP/BLOOD GLUCOSE: CPT

## 2024-01-01 PROCEDURE — 25010000002 METHYLPREDNISOLONE PER 125 MG: Performed by: NURSE PRACTITIONER

## 2024-01-01 PROCEDURE — 25010000002 FENTANYL CITRATE (PF) 50 MCG/ML SOLUTION

## 2024-01-01 PROCEDURE — 99291 CRITICAL CARE FIRST HOUR: CPT

## 2024-01-01 PROCEDURE — 82565 ASSAY OF CREATININE: CPT

## 2024-01-01 PROCEDURE — 92950 HEART/LUNG RESUSCITATION CPR: CPT

## 2024-01-01 PROCEDURE — 71275 CT ANGIOGRAPHY CHEST: CPT

## 2024-01-01 PROCEDURE — 87150 DNA/RNA AMPLIFIED PROBE: CPT | Performed by: NURSE PRACTITIONER

## 2024-01-01 PROCEDURE — 5A12012 PERFORMANCE OF CARDIAC OUTPUT, SINGLE, MANUAL: ICD-10-PCS | Performed by: INTERNAL MEDICINE

## 2024-01-01 PROCEDURE — 25010000002 EPINEPHRINE 1 MG/10ML SOLUTION PREFILLED SYRINGE: Performed by: STUDENT IN AN ORGANIZED HEALTH CARE EDUCATION/TRAINING PROGRAM

## 2024-01-01 PROCEDURE — 87641 MR-STAPH DNA AMP PROBE: CPT | Performed by: STUDENT IN AN ORGANIZED HEALTH CARE EDUCATION/TRAINING PROGRAM

## 2024-01-01 PROCEDURE — 85025 COMPLETE CBC W/AUTO DIFF WBC: CPT | Performed by: NURSE PRACTITIONER

## 2024-01-01 PROCEDURE — 83735 ASSAY OF MAGNESIUM: CPT | Performed by: STUDENT IN AN ORGANIZED HEALTH CARE EDUCATION/TRAINING PROGRAM

## 2024-01-01 PROCEDURE — 93005 ELECTROCARDIOGRAM TRACING: CPT

## 2024-01-01 PROCEDURE — 87040 BLOOD CULTURE FOR BACTERIA: CPT | Performed by: NURSE PRACTITIONER

## 2024-01-01 PROCEDURE — 25010000002 FUROSEMIDE PER 20 MG: Performed by: STUDENT IN AN ORGANIZED HEALTH CARE EDUCATION/TRAINING PROGRAM

## 2024-01-01 PROCEDURE — 87186 SC STD MICRODIL/AGAR DIL: CPT | Performed by: NURSE PRACTITIONER

## 2024-01-01 PROCEDURE — 80053 COMPREHEN METABOLIC PANEL: CPT | Performed by: NURSE PRACTITIONER

## 2024-01-01 PROCEDURE — 0BH17EZ INSERTION OF ENDOTRACHEAL AIRWAY INTO TRACHEA, VIA NATURAL OR ARTIFICIAL OPENING: ICD-10-PCS | Performed by: STUDENT IN AN ORGANIZED HEALTH CARE EDUCATION/TRAINING PROGRAM

## 2024-01-01 PROCEDURE — 36600 WITHDRAWAL OF ARTERIAL BLOOD: CPT

## 2024-01-01 PROCEDURE — 25010000002 VANCOMYCIN HCL IN NACL 1.5-0.9 GM/500ML-% SOLUTION: Performed by: NURSE PRACTITIONER

## 2024-01-01 PROCEDURE — 25010000002 MIDAZOLAM PER 1 MG: Performed by: STUDENT IN AN ORGANIZED HEALTH CARE EDUCATION/TRAINING PROGRAM

## 2024-01-01 PROCEDURE — 80051 ELECTROLYTE PANEL: CPT

## 2024-01-01 PROCEDURE — 85379 FIBRIN DEGRADATION QUANT: CPT | Performed by: NURSE PRACTITIONER

## 2024-01-01 PROCEDURE — 5A1935Z RESPIRATORY VENTILATION, LESS THAN 24 CONSECUTIVE HOURS: ICD-10-PCS | Performed by: INTERNAL MEDICINE

## 2024-01-01 RX ORDER — VANCOMYCIN/0.9 % SOD CHLORIDE 1.5G/250ML
20 PLASTIC BAG, INJECTION (ML) INTRAVENOUS ONCE
Status: COMPLETED | OUTPATIENT
Start: 2024-01-01 | End: 2024-01-01

## 2024-01-01 RX ORDER — PANTOPRAZOLE SODIUM 40 MG/10ML
40 INJECTION, POWDER, LYOPHILIZED, FOR SOLUTION INTRAVENOUS
Status: DISCONTINUED | OUTPATIENT
Start: 2024-01-01 | End: 2024-01-01 | Stop reason: HOSPADM

## 2024-01-01 RX ORDER — MIDAZOLAM HYDROCHLORIDE 1 MG/ML
2 INJECTION INTRAMUSCULAR; INTRAVENOUS
Status: DISCONTINUED | OUTPATIENT
Start: 2024-01-01 | End: 2024-01-01 | Stop reason: HOSPADM

## 2024-01-01 RX ORDER — ETOMIDATE 2 MG/ML
0.3 INJECTION INTRAVENOUS ONCE
Status: COMPLETED | OUTPATIENT
Start: 2024-01-01 | End: 2024-01-01

## 2024-01-01 RX ORDER — SODIUM CHLORIDE 0.9 % (FLUSH) 0.9 %
10 SYRINGE (ML) INJECTION EVERY 12 HOURS SCHEDULED
Status: DISCONTINUED | OUTPATIENT
Start: 2024-01-01 | End: 2024-01-01 | Stop reason: HOSPADM

## 2024-01-01 RX ORDER — ACETAMINOPHEN 325 MG/1
650 TABLET ORAL EVERY 4 HOURS PRN
Status: DISCONTINUED | OUTPATIENT
Start: 2024-01-01 | End: 2024-01-01 | Stop reason: HOSPADM

## 2024-01-01 RX ORDER — SODIUM CHLORIDE 0.9 % (FLUSH) 0.9 %
10 SYRINGE (ML) INJECTION AS NEEDED
Status: DISCONTINUED | OUTPATIENT
Start: 2024-01-01 | End: 2024-01-01 | Stop reason: HOSPADM

## 2024-01-01 RX ORDER — CHLORHEXIDINE GLUCONATE ORAL RINSE 1.2 MG/ML
15 SOLUTION DENTAL EVERY 12 HOURS SCHEDULED
Status: DISCONTINUED | OUTPATIENT
Start: 2024-01-01 | End: 2024-01-01 | Stop reason: HOSPADM

## 2024-01-01 RX ORDER — FENTANYL CITRATE 50 UG/ML
INJECTION, SOLUTION INTRAMUSCULAR; INTRAVENOUS
Status: COMPLETED
Start: 2024-01-01 | End: 2024-01-01

## 2024-01-01 RX ORDER — MIDAZOLAM HYDROCHLORIDE 1 MG/ML
4 INJECTION INTRAMUSCULAR; INTRAVENOUS ONCE
Status: COMPLETED | OUTPATIENT
Start: 2024-01-01 | End: 2024-01-01

## 2024-01-01 RX ORDER — MIDAZOLAM HYDROCHLORIDE 1 MG/ML
INJECTION INTRAMUSCULAR; INTRAVENOUS
Status: DISCONTINUED
Start: 2024-01-01 | End: 2024-01-01 | Stop reason: HOSPADM

## 2024-01-01 RX ORDER — MORPHINE SULFATE 2 MG/ML
2 INJECTION, SOLUTION INTRAMUSCULAR; INTRAVENOUS EVERY 4 HOURS PRN
Status: DISCONTINUED | OUTPATIENT
Start: 2024-01-01 | End: 2024-01-01

## 2024-01-01 RX ORDER — SODIUM CHLORIDE 9 MG/ML
40 INJECTION, SOLUTION INTRAVENOUS AS NEEDED
Status: DISCONTINUED | OUTPATIENT
Start: 2024-01-01 | End: 2024-01-01 | Stop reason: HOSPADM

## 2024-01-01 RX ORDER — ONDANSETRON 4 MG/1
4 TABLET, ORALLY DISINTEGRATING ORAL EVERY 6 HOURS PRN
Status: DISCONTINUED | OUTPATIENT
Start: 2024-01-01 | End: 2024-01-01 | Stop reason: HOSPADM

## 2024-01-01 RX ORDER — ETOMIDATE 2 MG/ML
INJECTION INTRAVENOUS
Status: DISCONTINUED
Start: 2024-01-01 | End: 2024-01-01 | Stop reason: HOSPADM

## 2024-01-01 RX ORDER — NOREPINEPHRINE BITARTRATE 0.03 MG/ML
.02-.3 INJECTION, SOLUTION INTRAVENOUS
Status: DISCONTINUED | OUTPATIENT
Start: 2024-01-01 | End: 2024-01-01 | Stop reason: HOSPADM

## 2024-01-01 RX ORDER — NITROGLYCERIN 0.4 MG/1
0.4 TABLET SUBLINGUAL
Status: DISCONTINUED | OUTPATIENT
Start: 2024-01-01 | End: 2024-01-01 | Stop reason: HOSPADM

## 2024-01-01 RX ORDER — ONDANSETRON 2 MG/ML
4 INJECTION INTRAMUSCULAR; INTRAVENOUS EVERY 6 HOURS PRN
Status: DISCONTINUED | OUTPATIENT
Start: 2024-01-01 | End: 2024-01-01 | Stop reason: HOSPADM

## 2024-01-01 RX ORDER — MAGNESIUM SULFATE 1 G/100ML
1 INJECTION INTRAVENOUS
Status: DISCONTINUED | OUTPATIENT
Start: 2024-01-01 | End: 2024-01-01 | Stop reason: HOSPADM

## 2024-01-01 RX ORDER — BISACODYL 10 MG
10 SUPPOSITORY, RECTAL RECTAL DAILY PRN
Status: DISCONTINUED | OUTPATIENT
Start: 2024-01-01 | End: 2024-01-01 | Stop reason: HOSPADM

## 2024-01-01 RX ORDER — IPRATROPIUM BROMIDE AND ALBUTEROL SULFATE 2.5; .5 MG/3ML; MG/3ML
3 SOLUTION RESPIRATORY (INHALATION) EVERY 6 HOURS PRN
Status: DISCONTINUED | OUTPATIENT
Start: 2024-01-01 | End: 2024-01-01 | Stop reason: HOSPADM

## 2024-01-01 RX ORDER — BISACODYL 5 MG/1
5 TABLET, DELAYED RELEASE ORAL DAILY PRN
Status: DISCONTINUED | OUTPATIENT
Start: 2024-01-01 | End: 2024-01-01 | Stop reason: HOSPADM

## 2024-01-01 RX ORDER — FENTANYL CITRATE 50 UG/ML
25 INJECTION, SOLUTION INTRAMUSCULAR; INTRAVENOUS
Status: DISCONTINUED | OUTPATIENT
Start: 2024-01-01 | End: 2024-01-01 | Stop reason: HOSPADM

## 2024-01-01 RX ORDER — ACETAMINOPHEN 650 MG/1
650 SUPPOSITORY RECTAL EVERY 4 HOURS PRN
Status: DISCONTINUED | OUTPATIENT
Start: 2024-01-01 | End: 2024-01-01 | Stop reason: HOSPADM

## 2024-01-01 RX ORDER — HEPARIN SODIUM 5000 [USP'U]/ML
5000 INJECTION, SOLUTION INTRAVENOUS; SUBCUTANEOUS EVERY 8 HOURS SCHEDULED
Status: DISCONTINUED | OUTPATIENT
Start: 2024-01-01 | End: 2024-01-01 | Stop reason: HOSPADM

## 2024-01-01 RX ORDER — POLYETHYLENE GLYCOL 3350 17 G/17G
17 POWDER, FOR SOLUTION ORAL DAILY PRN
Status: DISCONTINUED | OUTPATIENT
Start: 2024-01-01 | End: 2024-01-01 | Stop reason: HOSPADM

## 2024-01-01 RX ORDER — AMOXICILLIN 250 MG
2 CAPSULE ORAL 2 TIMES DAILY
Status: DISCONTINUED | OUTPATIENT
Start: 2024-01-01 | End: 2024-01-01 | Stop reason: HOSPADM

## 2024-01-01 RX ORDER — FENTANYL CITRATE 50 UG/ML
25 INJECTION, SOLUTION INTRAMUSCULAR; INTRAVENOUS ONCE
Status: DISCONTINUED | OUTPATIENT
Start: 2024-01-01 | End: 2024-01-01 | Stop reason: HOSPADM

## 2024-01-01 RX ORDER — METHYLPREDNISOLONE SODIUM SUCCINATE 125 MG/2ML
125 INJECTION, POWDER, LYOPHILIZED, FOR SOLUTION INTRAMUSCULAR; INTRAVENOUS ONCE
Status: COMPLETED | OUTPATIENT
Start: 2024-01-01 | End: 2024-01-01

## 2024-01-01 RX ORDER — IPRATROPIUM BROMIDE AND ALBUTEROL SULFATE 2.5; .5 MG/3ML; MG/3ML
3 SOLUTION RESPIRATORY (INHALATION) ONCE
Status: DISCONTINUED | OUTPATIENT
Start: 2024-01-01 | End: 2024-01-01 | Stop reason: HOSPADM

## 2024-01-01 RX ORDER — FUROSEMIDE 10 MG/ML
40 INJECTION INTRAMUSCULAR; INTRAVENOUS ONCE
Status: COMPLETED | OUTPATIENT
Start: 2024-01-01 | End: 2024-01-01

## 2024-01-01 RX ORDER — METHYLPREDNISOLONE SODIUM SUCCINATE 125 MG/2ML
60 INJECTION, POWDER, LYOPHILIZED, FOR SOLUTION INTRAMUSCULAR; INTRAVENOUS EVERY 8 HOURS
Status: DISCONTINUED | OUTPATIENT
Start: 2024-01-01 | End: 2024-01-01 | Stop reason: HOSPADM

## 2024-01-01 RX ORDER — IBUPROFEN 600 MG/1
600 TABLET ORAL ONCE
Status: COMPLETED | OUTPATIENT
Start: 2024-01-01 | End: 2024-01-01

## 2024-01-01 RX ADMIN — EPINEPHRINE 1 MG: 0.1 INJECTION, SOLUTION ENDOTRACHEAL; INTRACARDIAC; INTRAVENOUS at 02:53

## 2024-01-01 RX ADMIN — EPINEPHRINE 1 MG: 0.1 INJECTION, SOLUTION ENDOTRACHEAL; INTRACARDIAC; INTRAVENOUS at 02:42

## 2024-01-01 RX ADMIN — SODIUM CHLORIDE 1000 ML: 9 INJECTION, SOLUTION INTRAVENOUS at 20:44

## 2024-01-01 RX ADMIN — METHYLPREDNISOLONE SODIUM SUCCINATE 125 MG: 125 INJECTION, POWDER, FOR SOLUTION INTRAMUSCULAR; INTRAVENOUS at 21:54

## 2024-01-01 RX ADMIN — EPINEPHRINE 1 MG: 0.1 INJECTION, SOLUTION ENDOTRACHEAL; INTRACARDIAC; INTRAVENOUS at 02:39

## 2024-01-01 RX ADMIN — SODIUM CHLORIDE 1000 ML: 0.9 INJECTION, SOLUTION INTRAVENOUS at 21:02

## 2024-01-01 RX ADMIN — NOREPINEPHRINE BITARTRATE 0.04 MCG/KG/MIN: 0.03 INJECTION, SOLUTION INTRAVENOUS at 21:51

## 2024-01-01 RX ADMIN — SODIUM CHLORIDE 2160 ML: 9 INJECTION, SOLUTION INTRAVENOUS at 21:52

## 2024-01-01 RX ADMIN — SODIUM BICARBONATE 50 MEQ: 84 INJECTION, SOLUTION INTRAVENOUS at 03:02

## 2024-01-01 RX ADMIN — SODIUM BICARBONATE 100 MEQ: 84 INJECTION, SOLUTION INTRAVENOUS at 02:40

## 2024-01-01 RX ADMIN — FUROSEMIDE 40 MG: 10 INJECTION, SOLUTION INTRAMUSCULAR; INTRAVENOUS at 00:18

## 2024-01-01 RX ADMIN — EPINEPHRINE 1 MG: 0.1 INJECTION, SOLUTION ENDOTRACHEAL; INTRACARDIAC; INTRAVENOUS at 02:58

## 2024-01-01 RX ADMIN — IBUPROFEN 600 MG: 600 TABLET, FILM COATED ORAL at 20:57

## 2024-01-01 RX ADMIN — CHLORHEXIDINE GLUCONATE 15 ML: 1.2 RINSE ORAL at 00:50

## 2024-01-01 RX ADMIN — FENTANYL CITRATE 25 MCG: 50 INJECTION, SOLUTION INTRAMUSCULAR; INTRAVENOUS at 00:08

## 2024-01-01 RX ADMIN — Medication 10 ML: at 23:55

## 2024-01-01 RX ADMIN — MAGNESIUM SULFATE IN DEXTROSE 1 G: 10 INJECTION, SOLUTION INTRAVENOUS at 00:50

## 2024-01-01 RX ADMIN — ETOMIDATE 10 MG: 2 INJECTION INTRAVENOUS at 00:10

## 2024-01-01 RX ADMIN — Medication 1500 MG: at 22:15

## 2024-01-01 RX ADMIN — SODIUM BICARBONATE 50 MEQ: 84 INJECTION, SOLUTION INTRAVENOUS at 02:47

## 2024-01-01 RX ADMIN — MAGNESIUM SULFATE IN DEXTROSE 1 G: 10 INJECTION, SOLUTION INTRAVENOUS at 01:43

## 2024-01-01 RX ADMIN — EPINEPHRINE 1 MG: 0.1 INJECTION, SOLUTION ENDOTRACHEAL; INTRACARDIAC; INTRAVENOUS at 03:02

## 2024-01-01 RX ADMIN — MIDAZOLAM 2 MG: 1 INJECTION INTRAMUSCULAR; INTRAVENOUS at 00:08

## 2024-01-01 RX ADMIN — CEFEPIME 2000 MG: 2 INJECTION, POWDER, FOR SOLUTION INTRAVENOUS at 22:11

## 2024-01-01 RX ADMIN — EPINEPHRINE 1 MG: 0.1 INJECTION, SOLUTION ENDOTRACHEAL; INTRACARDIAC; INTRAVENOUS at 02:45

## 2024-01-01 RX ADMIN — IOPAMIDOL 100 ML: 755 INJECTION, SOLUTION INTRAVENOUS at 22:34

## 2024-01-01 RX ADMIN — EPINEPHRINE 1 MG: 0.1 INJECTION, SOLUTION ENDOTRACHEAL; INTRACARDIAC; INTRAVENOUS at 02:49

## 2024-01-17 PROBLEM — Z51.81 ENCOUNTER FOR MEDICATION MONITORING: Status: RESOLVED | Noted: 2022-11-15 | Resolved: 2024-01-01

## 2024-01-17 PROBLEM — I95.9 SEPSIS ASSOCIATED HYPOTENSION: Status: RESOLVED | Noted: 2021-05-18 | Resolved: 2024-01-01

## 2024-01-17 PROBLEM — M32.9 SLE (SYSTEMIC LUPUS ERYTHEMATOSUS): Chronic | Status: ACTIVE | Noted: 2018-01-08

## 2024-01-17 PROBLEM — M62.838 MUSCLE SPASM: Status: RESOLVED | Noted: 2020-05-01 | Resolved: 2024-01-01

## 2024-01-17 PROBLEM — E55.9 VITAMIN D DEFICIENCY: Chronic | Status: ACTIVE | Noted: 2018-01-08

## 2024-01-17 PROBLEM — E86.0 DEHYDRATION: Status: RESOLVED | Noted: 2021-05-18 | Resolved: 2024-01-01

## 2024-01-17 PROBLEM — J18.9 PNEUMONIA: Status: RESOLVED | Noted: 2023-01-01 | Resolved: 2024-01-01

## 2024-01-17 PROBLEM — B34.8 PARAINFLUENZA: Status: ACTIVE | Noted: 2024-01-01

## 2024-01-17 PROBLEM — J12.82 PNEUMONIA DUE TO COVID-19 VIRUS: Status: ACTIVE | Noted: 2024-01-01

## 2024-01-17 PROBLEM — N17.9 AKI (ACUTE KIDNEY INJURY): Status: ACTIVE | Noted: 2024-01-01

## 2024-01-17 PROBLEM — J18.9 PNEUMONIA DUE TO INFECTIOUS ORGANISM: Status: RESOLVED | Noted: 2021-05-17 | Resolved: 2024-01-01

## 2024-01-17 PROBLEM — U07.1 PNEUMONIA DUE TO COVID-19 VIRUS: Status: ACTIVE | Noted: 2024-01-01

## 2024-01-17 PROBLEM — A41.9 SEPTIC SHOCK: Status: ACTIVE | Noted: 2024-01-01

## 2024-01-17 PROBLEM — R65.21 SEPTIC SHOCK: Status: ACTIVE | Noted: 2024-01-01

## 2024-01-17 PROBLEM — J44.9 COPD WITH HYPOXIA: Status: RESOLVED | Noted: 2023-01-01 | Resolved: 2024-01-01

## 2024-01-17 PROBLEM — E05.90 HYPERTHYROIDISM: Chronic | Status: ACTIVE | Noted: 2022-11-15

## 2024-01-17 PROBLEM — A41.9 SEPSIS ASSOCIATED HYPOTENSION: Status: RESOLVED | Noted: 2021-05-18 | Resolved: 2024-01-01

## 2024-01-17 PROBLEM — E87.6 HYPOKALEMIA: Status: RESOLVED | Noted: 2021-05-18 | Resolved: 2024-01-01

## 2024-01-17 PROBLEM — J44.9 CHRONIC OBSTRUCTIVE PULMONARY DISEASE: Chronic | Status: ACTIVE | Noted: 2020-11-06

## 2024-01-17 PROBLEM — M19.90 OSTEOARTHRITIS: Status: RESOLVED | Noted: 2018-03-05 | Resolved: 2024-01-01

## 2024-01-17 NOTE — Clinical Note
Level of Care: Critical Care [6]   Admitting Physician: SUSAN PEOPLES [[id number]]   Attending Physician: SUSAN PEOPLES [[id number]]

## 2024-01-18 PROBLEM — E44.0 MODERATE MALNUTRITION: Status: ACTIVE | Noted: 2024-01-01

## 2024-01-18 PROBLEM — E83.42 HYPOMAGNESEMIA: Status: ACTIVE | Noted: 2024-01-01

## 2024-01-18 PROBLEM — J96.02 ACUTE RESPIRATORY FAILURE WITH HYPERCAPNIA: Status: ACTIVE | Noted: 2024-01-01

## 2024-01-18 NOTE — PAYOR COMM NOTE
"PA FORM WITH CLINICALS FOR INPATIENT PRECERT:  PATIENT WAS ADMITTED ON 2024  IN OUR FACILITY ON 2024.                      AUTHORIZATION PENDING:   PLEASE CALL OR FAX DETERMINATION TO CONTACT BELOW. THANK YOU.        Jo Vaughn RN MSN  /UR  [facility]  [phone] office  [phone] fax  [email or web address]    [facility]  NPI: [id number]  Tax: [phone]          Huseyin Coles \"Huseyin Coles\" (Dcsd. Female)       Date of Birth   1960    Social Security Number       Address   [address] [place] [address] [place] [facility]    Home Phone   [phone]    MRN   [id number]       Alevism   Jain    Marital Status                               Admission Date   24    Admission Type   Emergency    Admitting Provider   Will Farrar MD    Attending Provider       Department, Room/Bed   [facility] INTENSIVE CARE UNIT, 4/       Discharge Date   2024    Discharge Disposition       Discharge Destination                                 Attending Provider: (none)   Allergies: Sulfa Antibiotics    Isolation: Enh Drop/Con, Contact   Infection: COVID (confirmed) (24), MRSA (24)   Code Status: Prior    Ht: 162.6 cm (64\")   Wt: 44.8 kg (98 lb 12.3 oz)    Admission Cmt: None   Principal Problem: Septic shock [A41.9,R65.21]                   Active Insurance as of 2024       Primary Coverage       Payor Plan Insurance Group Employer/Plan Group    [facility] access hospital [id number] PPO [id number]       Payor Plan Address Payor Plan Phone Number Payor Plan Fax Number Effective Dates    [address] [phone]  10/1/2018 - None Entered    [facility] [id number]         Subscriber Name Subscriber Birth Date Member ID       HUSEYIN COLES 1960 [id number]                     Emergency Contacts        (Rel.) Home Phone Work Phone Mobile Phone    GLENYS STODDARD (Son) -- " -- [phone]    PRISCILLA STODDARD (Relative) -- -- --          24 2303  Inpatient Admission  Once     Completed     Level of Care: Critical Care  Diagnosis: Septic shock [[id number]]  Admitting Physician: SUSAN PEOPLES [[id number]]  Attending Physician: SUSAN PEOPLES [[id number]]  Certification: I Certify That Inpatient Hospital Services Are Medically Necessary For Greater Than 2 Midnights             History & Physical        Neva Pritchard APRN at 24 2304            Critical Care History and Physical     Justa Coles : 1960 MRN:[id number] LOS:0 ROOM:      Reason for admission: Septic shock     Assessment / Plan     Septic Shock: ( WBC>12 or <4 or >10% bands, Temp > 100.4 or < 96.8, HR>90, RR >20 or PCO2 <32, Lactic acid>2, and MAP<65 or SBP<90 )  Likely due to Pneumonia from COVID-19 and parainfluenza.   Will do blood / urine / sputum cultures.   Urine antigens  Started on cefepime and vanc.   Lasix given due to frothy pink sputum in ET tube at intubation, consider continuation  Continue solu-medrol, wean based on patient's condition  Persistent hypotension in spite of adequate fluid resuscitation.   C/w additional fluids as needed. Avoid fluid overload.   Titrate vasopressors for a target MAP of 65.     Acute Respiratory Failure  -CXR Reviewed  -EKG Reviewed  -ABG, monitor as ordered  -BNP, monitor as ordered  -Duoneb PRN  -Continue sedation and pain medication, titrate to effect  -Continue mechanical ventilation support with weaning as tolerated to baseline  -Titrate oxygen support delivery method for O2 SAT > 92%    Kidney injury, acute  -UA reviewed  -Renal ultrasound  -Monitor and trend labs  -Avoid nephrotoxic medications, hypotension, and NSAIDS  -Renally dose medications  -Monitor urine output  -Consider Nephrology consult if creatinine fails to improve    Hypomagnesemia  -Serum Mag 1.5  -Replacement per protocol  -Monitor and trend labs    Malnutrition  -Dietitian  consult    Hyperthyroidism  -TSH in am  -Continue Tapazole when clinically appropriate    Systemic Lupus  -Stable  -Continue current outpatient treatment    Hyperlipidemia; Essential Hypertension, Chronic  -Continue home medications as appropriate   - Monitor with routine vital signs     Anxiety/Depression  -Continue home meds as appropriate        Code Status (Patient has no pulse and is not breathing): CPR (Attempt to Resuscitate)  Medical Interventions (Patient has pulse or is breathing): Full Support       Nutrition:   NPO Diet NPO Type: Strict NPO     DVT prophylaxis:  Medical DVT prophylaxis orders are present.     History of Present illness     Justa Coles is a 63 y.o. female with PMH of anxiety, depression, hyperlipidemia, lupus, hyperthyroid, and COPD with continued smoking presented to the hospital for chest pain, and was admitted with a principal diagnosis of Septic shock.  Patient initially complained of severe left-sided chest pain upon presentation to the emergency department.  She is found to be tachypneic, tachycardic, and short of breath.  Patient was found to have COVID-19 and parainfluenza in the emergency department.  Patient remained hypotensive despite adequate fluid resuscitation and was brought to the intensive care unit for further evaluation and treatment.  Upon presentation to the intensive care unit patient was noted to be obtunded and an ABG was obtained.  Patient was emergently intubated based on ABG results.      ACP: Patient obtunded at time of assessment, no family at bedside will remain full code with full intervention; her son is listed as her decision-maker no answer when called via telephone.    Patient was seen and examined on 01/17/24 at 23:04 EST .    Subjective / Review of systems     Review of Systems   Unable to perform ROS: Acuity of condition        Past Medical/Surgical/Social/Family History & Allergies     Past Medical History:   Diagnosis Date    Graves disease      Hyperlipidemia     Hyperthyroidism 2022      Past Surgical History:   Procedure Laterality Date    APPENDECTOMY      BREAST BIOPSY Left 2020    stereo     SECTION  , ,     EXCISION BAKERS CYST KNEE Left     POSTPARTUM TUBAL LIGATION  1993    TONSILLECTOMY      TUBAL ABDOMINAL LIGATION        Social History     Socioeconomic History    Marital status:    Tobacco Use    Smoking status: Former     Packs/day: 1.00     Years: 20.00     Additional pack years: 0.00     Total pack years: 20.00     Types: Cigarettes     Quit date: 2011     Years since quittin.6     Passive exposure: Current    Smokeless tobacco: Never   Vaping Use    Vaping Use: Never used   Substance and Sexual Activity    Alcohol use: Not Currently    Drug use: Never    Sexual activity: Defer      Family History   Problem Relation Age of Onset    Colon cancer Father     Colon cancer Maternal Grandmother     Colon cancer Maternal Grandfather     Colon cancer Paternal Grandfather       Allergies   Allergen Reactions    Sulfa Antibiotics Hives      Social Determinants of Health     Tobacco Use: Medium Risk (2023)    Patient History     Smoking Tobacco Use: Former     Smokeless Tobacco Use: Never     Passive Exposure: Current   Alcohol Use: Not At Risk (2023)    AUDIT-C     Frequency of Alcohol Consumption: Monthly or less     Average Number of Drinks: 1 or 2     Frequency of Binge Drinking: Never   Financial Resource Strain: Not on file   Food Insecurity: No Food Insecurity (2023)    Hunger Vital Sign     Worried About Running Out of Food in the Last Year: Never true     Ran Out of Food in the Last Year: Never true   Transportation Needs: Not on file   Physical Activity: Not on file   Stress: Not on file   Social Connections: Unknown (10/11/2023)    Family and Community Support     Help with Day-to-Day Activities: Not on file     Lonely or Isolated: Not on file   Interpersonal Safety: Not At Risk  (1/17/2024)    Abuse Screen     Unsafe at Home or Work/School: no     Feels Threatened by Someone?: no     Does Anyone Keep You from Contacting Others or Doint Things Outside the Home?: no     Physical Sign of Abuse Present: no   Depression: Not at risk (5/1/2020)    PHQ-2     PHQ-2 Score: 0   Housing Stability: Not At Risk (8/21/2023)    Housing Stability     Current Living Arrangements: home     Potentially Unsafe Housing Conditions: none   Utilities: Not on file   Health Literacy: Unknown (8/21/2023)    Education     Help with school or training?: Not on file     Preferred Language: English   Employment: Unknown (10/11/2023)    Employment     Do you want help finding or keeping work or a job?: Not on file   Disabilities: Not At Risk (8/21/2023)    Disabilities     Concentrating, Remembering, or Making Decisions Difficulty: no     Doing Errands Independently Difficulty: no        Home Medications     Prior to Admission medications    Medication Sig Start Date End Date Taking? Authorizing Provider   albuterol sulfate  (90 Base) MCG/ACT inhaler Inhale 2 puffs Every 6 (Six) Hours As Needed for Wheezing or Shortness of Air. 6/20/22   MariaD e Jesus Justin MD   amLODIPine (NORVASC) 2.5 MG tablet Take 1 tablet by mouth Daily.    ProviderRonald MD   atorvastatin (LIPITOR) 10 MG tablet Take 1 tablet by mouth 2 (Two) Times a Week. Monday and Thursdays    Ronald Sutherland MD   azithromycin (Zithromax) 250 MG tablet Start tomorrow 8/24/2023, Take 1 tablet by mouth daily for 3 days. 8/23/23   Desire Roldan APRN   budesonide (PULMICORT) 1 MG/2ML nebulizer solution Take 2 mL by nebulization Daily. 3/8/23   Ronald Sutherland MD   Fluticasone-Umeclidin-Vilant (Trelegy Ellipta) 200-62.5-25 MCG/ACT aerosol powder  Inhale 1 puff Daily.    Ronald Sutherland MD   levalbuterol (XOPENEX HFA) 45 MCG/ACT inhaler Inhale 1-2 puffs Every 6 (Six) Hours As Needed for Wheezing or Shortness of Air. 6/22/22   Nhan  Maria De Jesus Hodge MD   levalbuterol (XOPENEX) 1.25 MG/3ML nebulizer solution Take 1 ampule by nebulization Every 4 (Four) Hours. 5/27/23   Ronald Sutherland MD   methIMAzole (TAPAZOLE) 5 MG tablet TAKE 1 TABLET BY MOUTH EVERY DAY 9/18/23   Oscar Bhandari MD   propranolol (INDERAL) 20 MG tablet Take 1 tablet by mouth 2 (Two) Times a Day.    Provider, MD Ronald        Objective / Physical Exam     Vital signs:  Temp: (!) 100.6 °F (38.1 °C)  BP: 137/68  Heart Rate: (!) 143  Resp: (!) 32  SpO2: 92 %  Weight: 98 kg (216 lb 0.8 oz)    Admission Weight: Weight: 98 kg (216 lb 0.8 oz)    Physical Exam  Vitals and nursing note reviewed.   Constitutional:       Appearance: Normal appearance. She is ill-appearing.      Comments: Chronically and acutely ill appearing   HENT:      Head: Normocephalic.      Nose: Nose normal.      Mouth/Throat:      Mouth: Mucous membranes are moist.      Pharynx: Oropharynx is clear.   Eyes:      Pupils: Pupils are equal, round, and reactive to light.   Cardiovascular:      Rate and Rhythm: Normal rate and regular rhythm.      Pulses: Normal pulses.      Heart sounds: Normal heart sounds.   Pulmonary:      Effort: Pulmonary effort is normal.      Breath sounds: Normal breath sounds.   Abdominal:      General: Bowel sounds are normal.      Palpations: Abdomen is soft.   Musculoskeletal:         General: Normal range of motion.      Cervical back: Normal range of motion.   Skin:     General: Skin is warm and dry.      Capillary Refill: Capillary refill takes 2 to 3 seconds.   Neurological:      Mental Status: She is alert.      Comments: obtunded          Labs     Results from last 7 days   Lab Units 01/17/24 2023   WBC 10*3/mm3 6.40   HEMATOCRIT % 43.2   PLATELETS 10*3/mm3 283      Results from last 7 days   Lab Units 01/17/24 2023   SODIUM mmol/L 137   POTASSIUM mmol/L 3.8   CHLORIDE mmol/L 102   CO2 mmol/L 19.0*   BUN mg/dL 40*   CREATININE mg/dL 1.66*        Imaging     XR Chest 1  View    Result Date: 1/18/2024  Impression: ET tube in the midthoracic trachea. Slight worsening of diffuse left lung pneumonia Electronically Signed: Nilesh Fairbanks MD  1/18/2024 12:49 AM EST  Workstation ID: [id number]    CT Angiogram Chest Pulmonary Embolism    Result Date: 1/17/2024  Impression: Limited study due to motion artifact. No evidence of pulmonary embolism. No evidence of thoracic aortic aneurysm or dissection. Advanced emphysematous changes. Extensive airspace disease throughout the left upper lobe compatible with pneumonia. Electronically Signed: Jun Sifuentes MD  1/17/2024 10:38 PM EST  Workstation ID: [id number]    XR Chest 1 View    Result Date: 1/17/2024  Impression: New left-sided airspace disease consistent with pneumonia. Electronically Signed: Tomi Wright MD  1/17/2024 8:40 PM EST  Workstation ID: [id number]       Chest X ray: My independent assessment showed infiltrates    EKG: My independent evaluation showed sinus tach, no ST changes    Current Medications     Scheduled Meds:  heparin (porcine), 5,000 Units, Subcutaneous, Q8H  ipratropium, 2 puff, Inhalation, 4x Daily - RT  ipratropium-albuterol, 3 mL, Nebulization, Once  senna-docusate sodium, 2 tablet, Oral, BID  sodium chloride, 10 mL, Intravenous, Q12H  vancomycin, 20 mg/kg (Adjusted), Intravenous, Once         Continuous Infusions:  norepinephrine, 0.02-0.3 mcg/kg/min, Last Rate: 0.1 mcg/kg/min (01/17/24 2206)       Plan discussed with RN. Reviewed all other data in the last 24 hours, including but not limited to vitals, labs, microbiology, imaging and pertinent notes from other providers.       JEFF Shrestha   Critical Care  01/17/24   23:04 EST       Electronically signed by Neva Pritchard APRN at 01/18/24 0226          Emergency Department Notes        Annemarie Hussein APRN at 01/17/24 2142          Subjective   History of Present Illness  Patient is a 63-year-old female who comes in with severe left-sided chest pain  tachypneic short of breath and tachycardic in acute distress      Review of Systems   Constitutional:  Positive for chills and fever. Negative for fatigue.   HENT:  Negative for congestion, tinnitus and trouble swallowing.    Eyes:  Negative for photophobia, discharge and redness.   Respiratory:  Positive for chest tightness and shortness of breath. Negative for cough.    Cardiovascular:  Positive for chest pain. Negative for palpitations.   Gastrointestinal:  Negative for abdominal pain, diarrhea, nausea and vomiting.   Genitourinary:  Negative for dysuria, frequency and urgency.   Musculoskeletal:  Negative for back pain, joint swelling and myalgias.   Skin:  Negative for rash.   Neurological:  Negative for dizziness and headaches.   Psychiatric/Behavioral:  Negative for confusion. The patient is nervous/anxious.    All other systems reviewed and are negative.      Past Medical History:   Diagnosis Date    Chronic obstructive pulmonary disease 2020    Former smoker.  Continue Breo 200-25 mcg daily and as needed albuterol.  Vaccinated against S. Pneumonia & COVID-19.  COPD action plan in AVS.    Graves disease     Hyperlipidemia     Hyperthyroidism 2022    Mixed anxiety depressive disorder 2016    Osteoarthritis     SLE (systemic lupus erythematosus) 2018    Positive RPN antibodies.  Previously evaluated by rheumatology; evaluation was inconclusive.  Patient complains of diffuse muscle cramping that waxes and wanes and is exacerbated by patient's line of work.  Patient declined further evaluation.    Vitamin D deficiency 2018       Allergies   Allergen Reactions    Sulfa Antibiotics Hives       Past Surgical History:   Procedure Laterality Date    APPENDECTOMY      BREAST BIOPSY Left 2020    stereo     SECTION  , ,     EXCISION BAKERS CYST KNEE Left     POSTPARTUM TUBAL LIGATION      TONSILLECTOMY      TUBAL ABDOMINAL LIGATION         Family History   Problem  Relation Age of Onset    Colon cancer Father     Colon cancer Maternal Grandmother     Colon cancer Maternal Grandfather     Colon cancer Paternal Grandfather        Social History     Socioeconomic History    Marital status:    Tobacco Use    Smoking status: Former     Packs/day: 1.00     Years: 20.00     Additional pack years: 0.00     Total pack years: 20.00     Types: Cigarettes     Quit date: 2011     Years since quittin.6     Passive exposure: Current    Smokeless tobacco: Never   Vaping Use    Vaping Use: Never used   Substance and Sexual Activity    Alcohol use: Not Currently    Drug use: Never    Sexual activity: Defer           Objective   Physical Exam  Vitals reviewed.   Constitutional:       Appearance: She is well-developed.      Comments: Cachectic   HENT:      Head: Normocephalic and atraumatic.   Eyes:      General: Lids are normal.      Conjunctiva/sclera: Conjunctivae normal.      Pupils: Pupils are equal, round, and reactive to light.   Cardiovascular:      Rate and Rhythm: Normal rate and regular rhythm.      Pulses:           Carotid pulses are 1+ on the right side and 1+ on the left side.       Radial pulses are 1+ on the right side and 1+ on the left side.        Dorsalis pedis pulses are 1+ on the right side and 1+ on the left side.        Posterior tibial pulses are 1+ on the right side and 1+ on the left side.      Heart sounds: Normal heart sounds.   Pulmonary:      Effort: Pulmonary effort is normal. No respiratory distress.      Breath sounds: Normal breath sounds. Examination of the left-upper field reveals decreased breath sounds and wheezing. Examination of the left-middle field reveals decreased breath sounds. Examination of the right-lower field reveals decreased breath sounds. Examination of the left-lower field reveals decreased breath sounds and wheezing. No wheezing.   Chest:      Chest wall: Tenderness present. No swelling, crepitus or edema.          Comments:  "Left lateral chest wall tender to palpation  Abdominal:      General: Bowel sounds are normal. There is no distension.      Palpations: Abdomen is soft. There is no mass.      Tenderness: There is no abdominal tenderness. There is no guarding or rebound.   Musculoskeletal:         General: No deformity. Normal range of motion.      Cervical back: Normal range of motion and neck supple.      Comments:  extremely kyphotic   Skin:     General: Skin is warm and dry.      Capillary Refill: Capillary refill takes 2 to 3 seconds.   Neurological:      General: No focal deficit present.      Mental Status: She is alert and oriented to person, place, and time.      GCS: GCS eye subscore is 4. GCS verbal subscore is 5. GCS motor subscore is 6.      Cranial Nerves: No cranial nerve deficit.      Sensory: No sensory deficit.      Deep Tendon Reflexes: Reflexes normal.         Procedures      EKG was interpreted by myself as well as Dr. Guido as sinus tach with a rate of 150 no ectopy no ST elevation it was compared to the previous dated 8/20/2023 where she was sinus tach with a rate of 114 the patient was febrile at the time of this EKG    ED Course  ED Course as of 01/18/24 0034   Wed Jan 17, 2024 2037 Rectal temp 100.6 [KW]   2054 Reports she has had eight 500 mg Tylenol since midnight last night [KW]   2127 Ready for CT [KW]   2209 Dr. Guido at Bedside [KW]   2211 /82 on 0.1mcg/ kg / min Levophed [KW]   2223 Spoke with Delilah the nurse practitioner with the intensivist and admitted to Dr. Farrar [KW]      ED Course User Index  [KW] Annemarie Hussein, APRN    /68   Pulse (!) 143   Temp 97.6 °F (36.4 °C) (Oral)   Resp (!) 32   Ht 162.6 cm (64\")   Wt 44.8 kg (98 lb 12.3 oz)   SpO2 92%   BMI 16.95 kg/m²   Labs Reviewed   RESPIRATORY PANEL PCR W/ COVID-19 (SARS-COV-2), NP SWAB IN UTM/VTP, 2 HR TAT - Abnormal; Notable for the following components:       Result Value    COVID19 Detected (*)     " Parainfluenza Virus 3 Detected (*)     All other components within normal limits    Narrative:     In the setting of a positive respiratory panel with a viral infection PLUS a negative procalcitonin without other underlying concern for bacterial infection, consider observing off antibiotics or discontinuation of antibiotics and continue supportive care. If the respiratory panel is positive for atypical bacterial infection (Bordetella pertussis, Chlamydophila pneumoniae, or Mycoplasma pneumoniae), consider antibiotic de-escalation to target atypical bacterial infection.   TROPONIN - Abnormal; Notable for the following components:    HS Troponin T 15 (*)     All other components within normal limits    Narrative:     High Sensitive Troponin T Reference Range:  <14.0 ng/L- Negative Female for AMI  <22.0 ng/L- Negative Male for AMI  >=14 - Abnormal Female indicating possible myocardial injury.  >=22 - Abnormal Male indicating possible myocardial injury.   Clinicians would have to utilize clinical acumen, EKG, Troponin, and serial changes to determine if it is an Acute Myocardial Infarction or myocardial injury due to an underlying chronic condition.        D-DIMER, QUANTITATIVE - Abnormal; Notable for the following components:    D-Dimer, Quantitative 2.54 (*)     All other components within normal limits    Narrative:     According to the assay 's published package insert, a normal (<0.50 mg/L (FEU)) D-dimer result in conjunction with a non-high clinical probability assessment, excludes deep vein thrombosis (DVT) and pulmonary embolism (PE) with high sensitivity.    D-dimer values increase with age and this can make VTE exclusion of an older population difficult. To address this, the American College of Physicians, based on best available evidence and recent guidelines, recommends that clinicians use age-adjusted D-dimer thresholds in patients greater than 50 years of age with: a) a low probability of PE  "who do not meet all Pulmonary Embolism Rule Out Criteria, or b) in those with intermediate probability of PE.   The formula for an age-adjusted D-dimer cut-off is \"age/100\".  For example, a 60 year old patient would have an age-adjusted cut-off of 0.60 mg/L (FEU) and an 80 year old 0.80 mg/L (FEU).   COMPREHENSIVE METABOLIC PANEL - Abnormal; Notable for the following components:    Glucose 132 (*)     BUN 40 (*)     Creatinine 1.66 (*)     CO2 19.0 (*)     Albumin 3.1 (*)     ALT (SGPT) 48 (*)     Anion Gap 16.0 (*)     eGFR 34.3 (*)     All other components within normal limits    Narrative:     GFR Normal >60  Chronic Kidney Disease <60  Kidney Failure <15     BNP (IN-HOUSE) - Abnormal; Notable for the following components:    proBNP 5,544.0 (*)     All other components within normal limits    Narrative:     This assay is used as an aid in the diagnosis of individuals suspected of having heart failure. It can be used as an aid in the diagnosis of acute decompensated heart failure (ADHF) in patients presenting with signs and symptoms of ADHF to the emergency department (ED). In addition, NT-proBNP of <300 pg/mL indicates ADHF is not likely.    Age Range Result Interpretation  NT-proBNP Concentration (pg/mL:      <50             Positive            >450                   Gray                 300-450                    Negative             <300    50-75           Positive            >900                  Gray                300-900                  Negative            <300      >75             Positive            >1800                  Gray                300-1800                  Negative            <300   HIGH SENSITIVITIY TROPONIN T 2HR - Abnormal; Notable for the following components:    HS Troponin T 27 (*)     Troponin T Delta 12 (*)     All other components within normal limits    Narrative:     High Sensitive Troponin T Reference Range:  <14.0 ng/L- Negative Female for AMI  <22.0 ng/L- Negative Male for " "AMI  >=14 - Abnormal Female indicating possible myocardial injury.  >=22 - Abnormal Male indicating possible myocardial injury.   Clinicians would have to utilize clinical acumen, EKG, Troponin, and serial changes to determine if it is an Acute Myocardial Infarction or myocardial injury due to an underlying chronic condition.        MANUAL DIFFERENTIAL - Abnormal; Notable for the following components:    Neutrophil % 36.0 (*)     Lymphocyte % 15.0 (*)     Monocyte % 17.0 (*)     Basophil % 2.0 (*)     Bands %  21.0 (*)     Metamyelocyte % 5.0 (*)     Myelocyte % 3.0 (*)     Monocytes Absolute 1.09 (*)     All other components within normal limits   LACTIC ACID, REFLEX - Abnormal; Notable for the following components:    Lactate 4.4 (*)     All other components within normal limits   PROTIME-INR - Abnormal; Notable for the following components:    Protime 16.1 (*)     INR 1.52 (*)     All other components within normal limits   APTT - Abnormal; Notable for the following components:    PTT 35.0 (*)     All other components within normal limits   PROCALCITONIN - Abnormal; Notable for the following components:    Procalcitonin 12.32 (*)     All other components within normal limits    Narrative:     As a Marker for Sepsis (Non-Neonates):    1. <0.5 ng/mL represents a low risk of severe sepsis and/or septic shock.  2. >2 ng/mL represents a high risk of severe sepsis and/or septic shock.    As a Marker for Lower Respiratory Tract Infections that require antibiotic therapy:    PCT on Admission    Antibiotic Therapy       6-12 Hrs later    >0.5                Strongly Recommended  >0.25 - <0.5        Recommended   0.1 - 0.25          Discouraged              Remeasure/reassess PCT  <0.1                Strongly Discouraged     Remeasure/reassess PCT    As 28 day mortality risk marker: \"Change in Procalcitonin Result\" (>80% or <=80%) if Day 0 (or Day 1) and Day 4 values are available. Refer to " http://[email or web address]-pct-calculator.com    Change in PCT <=80%  A decrease of PCT levels below or equal to 80% defines a positive change in PCT test result representing a higher risk for 28-day all-cause mortality of patients diagnosed with severe sepsis for septic shock.    Change in PCT >80%  A decrease of PCT levels of more than 80% defines a negative change in PCT result representing a lower risk for 28-day all-cause mortality of patients diagnosed with severe sepsis or septic shock.      BLOOD GAS, ARTERIAL - Abnormal; Notable for the following components:    pH, Arterial 7.304 (*)     pCO2, Arterial 34.7 (*)     pO2, Arterial 82.1 (*)     HCO3, Arterial 17.2 (*)     Base Excess, Arterial -8.3 (*)     CO2 Content 18.3 (*)     All other components within normal limits   MAGNESIUM - Abnormal; Notable for the following components:    Magnesium 1.5 (*)     All other components within normal limits   POC LACTATE - Abnormal; Notable for the following components:    Lactate 4.4 (*)     All other components within normal limits   POCT GLUCOSE FINGERSTICK - Abnormal; Notable for the following components:    Glucose 119 (*)     All other components within normal limits   POCT CREATININE - Abnormal; Notable for the following components:    eGFR 55.6 (*)     All other components within normal limits   ELECTROLYTES + H&H - Abnormal; Notable for the following components:    Glucose 123 (*)     All other components within normal limits   POCT GLUCOSE FINGERSTICK - Abnormal; Notable for the following components:    Glucose 123 (*)     All other components within normal limits   CBC WITH AUTO DIFFERENTIAL - Normal   BLOOD CULTURE   BLOOD CULTURE   MRSA SCREEN, PCR   SCAN SLIDE   BLOOD GAS, ARTERIAL   MAGNESIUM   PHOSPHORUS   COMPREHENSIVE METABOLIC PANEL   CALCIUM, IONIZED   LIPID PANEL   TSH   CBC WITH AUTO DIFFERENTIAL   VANCOMYCIN, RANDOM   T4, FREE   BLOOD GAS, ARTERIAL   POC LACTATE   POC LACTATE   CBC AND DIFFERENTIAL     Narrative:     The following orders were created for panel order CBC & Differential.  Procedure                               Abnormality         Status                     ---------                               -----------         ------                     CBC Auto Differential[[id number]]        Normal              Final result               Scan Slide[[id number]]                                       Final result                 Please view results for these tests on the individual orders.   CBC AND DIFFERENTIAL    Narrative:     The following orders were created for panel order CBC & Differential.  Procedure                               Abnormality         Status                     ---------                               -----------         ------                     CBC Auto Differential[[id number]]                                                         Please view results for these tests on the individual orders.     Medications   sodium chloride 0.9 % flush 10 mL (has no administration in time range)   ipratropium-albuterol (DUO-NEB) nebulizer solution 3 mL (3 mL Nebulization Not Given 1/17/24 2212)   norepinephrine (LEVOPHED) 8 mg in 250 mL NS infusion (premix) (0 mcg/kg/min × 98 kg Intravenous Stopped 1/17/24 2354)   sodium chloride 0.9 % flush 10 mL (has no administration in time range)   ipratropium (ATROVENT HFA) inhaler 2 puff (has no administration in time range)   nitroglycerin (NITROSTAT) SL tablet 0.4 mg (has no administration in time range)   sodium chloride 0.9 % flush 10 mL (10 mL Intravenous Given 1/17/24 2355)   sodium chloride 0.9 % flush 10 mL (has no administration in time range)   sodium chloride 0.9 % infusion 40 mL (has no administration in time range)   ipratropium-albuterol (DUO-NEB) nebulizer solution 3 mL (has no administration in time range)   sennosides-docusate (PERICOLACE) 8.6-50 MG per tablet 2 tablet (2 tablets Oral Not Given 1/17/24 2350)     And   polyethylene glycol  (MIRALAX) packet 17 g (has no administration in time range)     And   bisacodyl (DULCOLAX) EC tablet 5 mg (has no administration in time range)     And   bisacodyl (DULCOLAX) suppository 10 mg (has no administration in time range)   acetaminophen (TYLENOL) tablet 650 mg (has no administration in time range)     Or   acetaminophen (TYLENOL) suppository 650 mg (has no administration in time range)   ondansetron ODT (ZOFRAN-ODT) disintegrating tablet 4 mg (has no administration in time range)     Or   ondansetron (ZOFRAN) injection 4 mg (has no administration in time range)   Potassium Replacement - Follow Nurse / BPA Driven Protocol (has no administration in time range)   Magnesium Low Dose Replacement - Follow Nurse / BPA Driven Protocol (has no administration in time range)   Phosphorus Replacement - Follow Nurse / BPA Driven Protocol (has no administration in time range)   Calcium Replacement - Follow Nurse / BPA Driven Protocol (has no administration in time range)   heparin (porcine) 5000 UNIT/ML injection 5,000 Units (has no administration in time range)   cefepime 2 gm IVPB in 100 ml NS (MBP) (has no administration in time range)   Pharmacy to dose vancomycin (has no administration in time range)   methylPREDNISolone sodium succinate (SOLU-Medrol) injection 60 mg (has no administration in time range)   morphine injection 2 mg (has no administration in time range)   Vancomycin Pharmacy Intermittent/Pulse Dosing (has no administration in time range)   fentaNYL citrate (PF) (SUBLIMAZE) injection 25 mcg ( Intravenous Canceled Entry 1/18/24 0017)   etomidate (AMIDATE) 2 MG/ML injection  - ADS Override Pull (  Canceled Entry 1/18/24 0017)   midazolam (VERSED) 2 MG/2ML injection  - ADS Override Pull (  Canceled Entry 1/18/24 0017)   sodium chloride 0.9 % bolus 1,000 mL (0 mL Intravenous Stopped 1/17/24 2052)   ibuprofen (ADVIL,MOTRIN) tablet 600 mg (600 mg Oral Given 1/17/24 2057)   sodium chloride 0.9 % bolus 1,000  mL (0 mL Intravenous Stopped 1/17/24 2150)   cefepime 2 gm IVPB in 100 ml NS (MBP) (2,000 mg Intravenous New Bag 1/17/24 2211)   vancomycin IVPB 1500 mg in 0.9% NaCl (Premix) 500 mL (1,500 mg Intravenous New Bag 1/17/24 2215)   sepsis fluid NS 0.9 % bolus 2,160 mL (2,160 mL Intravenous Not Given 1/18/24 0018)   methylPREDNISolone sodium succinate (SOLU-Medrol) injection 125 mg (125 mg Intravenous Given 1/17/24 2154)   iopamidol (ISOVUE-370) 76 % injection 100 mL (100 mL Intravenous Given 1/17/24 2234)   etomidate (AMIDATE) injection 13.44 mg (10 mg Intravenous Given 1/18/24 0010)   midazolam (VERSED) injection 4 mg (2 mg Intravenous Given 1/18/24 0008)   fentaNYL citrate (PF) (SUBLIMAZE) 50 mcg/mL injection  - ADS Override Pull (25 mcg  Given 1/18/24 0008)   furosemide (LASIX) injection 40 mg (40 mg Intravenous Given 1/18/24 0018)     CT Angiogram Chest Pulmonary Embolism    Result Date: 1/17/2024  Impression: Limited study due to motion artifact. No evidence of pulmonary embolism. No evidence of thoracic aortic aneurysm or dissection. Advanced emphysematous changes. Extensive airspace disease throughout the left upper lobe compatible with pneumonia. Electronically Signed: Jun Sifuentes MD  1/17/2024 10:38 PM EST  Workstation ID: [id number]    XR Chest 1 View    Result Date: 1/17/2024  Impression: New left-sided airspace disease consistent with pneumonia. Electronically Signed: Tomi Wright MD  1/17/2024 8:40 PM EST  Workstation ID: [id number]                                  SEPTIC SHOCK FOCUSED EXAM ATTESTATION    I attest that I have reassessed tissue perfusion after the fluid bolus given.    Annemarie Hussein, APRN  01/18/24  00:34 EST            Medical Decision Making  The high probability of sudden, clinically significant deterioration in the patient's condition required the highest level of my preparedness to intervene urgently.  The services I provided to this patient were to treat and/or prevent  clinically significant deterioration that could result in: death . Services included the following: chart data review, reviewing nurses notes an/or old charts, documentation time, consultant collaboration regarding findings and treatment options, vital sign assessments and ordering, interpreting and reviewing diagnostic studies/lab test.  Aggregate critical care time was death , which includes only time during which I was engaged in work directly related to the patient's care, as described above, whether at the bedside or elsewhere in the Emergency Department. It did not include time spent performing other procedure.    Patient is a 63-year-old female who came in in acute distress with anxiety severe left chest wall pain and shortness of breath.  Concerning for flu COVID RSV pneumonia COPD exacerbation-patient had IV established and blood work was obtained the patient was found to have severe bandemia at 21% the white count was only 6.4 but her lactic was 4.4 and she meets severe sepsis protocol she was given cefepime and Vanco-she was given the full sepsis fluid bolus and was reassessed after fluid bolus.  The patient was given Solu-Medrol and PCR panel shows she is positive for COVID-19 as well as parainfluenza-her chest x-ray also shows an extensive left-sided pneumonia the patient subsequently had an elevated dimer at 2.54 and after she was medically stable she was taken to CT where she had a CT which was read by myself as well as the radiologist as having no pulmonary emboli no aortic or thoracic dissection does have advanced emphysematous changes and extensive airspace disease throughout the left upper lobe consistent with pneumonia.  The patient had an ABG which showed some metabolic acidosis her oxygen saturation was stable at this time she continued to be tachycardic her blood pressure continued to be low requiring the use of Levophed-the patient's blood pressure increased and she was maintaining her oxygen  saturation when she was discussed with Delilah the nurse practitioner with the intensivist who agreed to admit to the ICU  Was stable at the time of admission to ICU        Problems Addressed:  Bandemia: complicated acute illness or injury  Chest pain, unspecified type: complicated acute illness or injury  COVID-19: complicated acute illness or injury  Dyspnea, unspecified type: complicated acute illness or injury  Fever and chills: complicated acute illness or injury  Parainfluenza: complicated acute illness or injury  Pneumonia of left lung due to infectious organism, unspecified part of lung: complicated acute illness or injury  Renal insufficiency: complicated acute illness or injury  Sepsis with acute hypoxic respiratory failure and septic shock, due to unspecified organism: complicated acute illness or injury    Amount and/or Complexity of Data Reviewed  External Data Reviewed: labs, radiology and notes.  Labs: ordered. Decision-making details documented in ED Course.  Radiology: ordered and independent interpretation performed. Decision-making details documented in ED Course.  ECG/medicine tests: ordered and independent interpretation performed. Decision-making details documented in ED Course.    Risk  OTC drugs.  Prescription drug management.  Decision regarding hospitalization.    Critical Care  Total time providing critical care: 45 minutes        Final diagnoses:   Fever and chills   Chest pain, unspecified type   Dyspnea, unspecified type   Pneumonia of left lung due to infectious organism, unspecified part of lung   Renal insufficiency   COVID-19   Sepsis with acute hypoxic respiratory failure and septic shock, due to unspecified organism   Bandemia   Parainfluenza       ED Disposition  ED Disposition       ED Disposition   Decision to Admit    Condition   --    Comment   Level of Care: Critical Care [6]   Diagnosis: Septic shock [4474410]   Admitting Physician: SUSAN PEOPLES [[id number]]   Attending  "Physician: SUSAN PEOPLES [[id number]]   Certification: I Certify That Inpatient Hospital Services Are Medically Necessary For Greater Than 2 Midnights                 No follow-up provider specified.       Medication List      No changes were made to your prescriptions during this visit.            Annemarie Hussein APRN  01/18/24 0034      Electronically signed by Annemarie Hussein APRN at 01/18/24 0034          Operative/Procedure Notes (all)        Neva Pritchard APRN at 01/18/24 0034  Version 1 of 1       Procedure Orders    1. Intubation [[id number]] ordered by Neva Pritchard APRN               Post-procedure Diagnoses    1. Acute respiratory failure with hypercapnia [J96.02]                 Intubation    Date/Time: 1/18/2024 12:10 AM    Performed by: Neva Pritchard APRN  Authorized by: Neva Pritchard APRN  Consent: The procedure was performed in an emergent situation.  Patient identity confirmed: arm band, provided demographic data, anonymous protocol, patient vented/unresponsive and hospital-assigned identification number  Time out: Immediately prior to procedure a \"time out\" was called to verify the correct patient, procedure, equipment, support staff and site/side marked as required.  Indications: respiratory failure  Intubation method: glidescope.  Sedatives: fentanyl, etomidate and midazolam  Laryngoscope size: Mac 3  Tube size: 7.5 mm  Tube type: cuffed  Number of attempts: 2  Ventilation between attempts: BVM  Cricoid pressure: yes  Cords visualized: yes  Post-procedure assessment: CO2 detector  Breath sounds: equal and reduced on right  Cuff inflated: yes  ETT to lip: 22 cm  Tube secured with: ETT weldon  Chest x-ray interpreted by me.  Chest x-ray findings: endotracheal tube in appropriate position  Patient tolerance: patient tolerated the procedure well with no immediate complications      Electronically signed by JEFF Shrestha, 01/18/24, 12:37 AM EST.      Electronically " signed by Neva Pritchard APRN at 24 0037       Physician Progress Notes (all)    No notes of this type exist for this encounter.       Consult Notes (all)    No notes of this type exist for this encounter.          Discharge Summary        Neva Pritchard APRN at 24 0516          CRITICAL CARE DEATH SUMMARY    PATIENT NAME:     Justa Coles  :     1960  MRN:     [id number]    ADMISSION DATE:  2024      DATE/TIME OF DEATH:    2024 at 3:18 AM       CAUSE OF DEATH  Septic shock compounded by COVID-19, parainfluenza, flash pulmonary edema, COPD, and ALEXANDRA      FINAL DIAGNOSES  Septic shock  COVID-19 pneumonia  Parainfluenza  Flash pulmonary edema  COPD with continued smoking  Acute respiratory failure  Acute kidney injury  Hypomagnesemia   Malnutrition  Hyperthyroidism  Systemic lupus  Hypertension  Hyperlipidemia  Anxiety  Depression      ADMISSION DIAGNOSES/PRESENTING PROBLEMS  Septic shock  COVID-19 pneumonia  Parainfluenza  Acute respiratory failure  Acute kidney injury  Hypomagnesemia   Malnutrition  Hyperthyroidism  Systemic lupus  Hypertension  Hyperlipidemia  Anxiety  Depression    HOSPITAL COURSE  Justa Coles was a 63 y.o. female with PMH of anxiety, depression, hyperlipidemia, lupus, hyperthyroid, and COPD with continued smoking presented to the hospital for chest pain, and was admitted with a principal diagnosis of Septic shock.  Patient initially complained of severe left-sided chest pain upon presentation to the emergency department.  She was tachypneic, tachycardic, and short of breath in the emergency department.  Patient was found to have COVID-19 and parainfluenza.  Patient remained hypotensive despite adequate fluid resuscitation and was brought to the intensive care unit for further evaluation and treatment.  Upon presentation to the intensive care unit patient was noted to be obtunded and an ABG was obtained.  Patient was emergently intubated based on ABG  results.  CODE BLUE was called on patient with initial rhythm of pulseless electrical activity.  Patient remained PEA throughout code. Time of death noted to be on 1/18/2024 at 3:18 AM .  Family was notified and emotional support provided.       PROCEDURES PERFORMED    01/18 0034 Intubation    CONSULTING PHYSICIANS  Consults       Date and Time Order Name Status Description    1/17/2024 10:17 PM Intensivist (on-call MD unless specified)              RESULTS REVIEW  LABS  Lab Results (last 24 hours)       Procedure Component Value Units Date/Time    High Sensitivity Troponin T [[id number]]  (Abnormal) Collected: 01/17/24 2023    Specimen: Blood Updated: 01/17/24 2054     HS Troponin T 15 ng/L     Narrative:      High Sensitive Troponin T Reference Range:  <14.0 ng/L- Negative Female for AMI  <22.0 ng/L- Negative Male for AMI  >=14 - Abnormal Female indicating possible myocardial injury.  >=22 - Abnormal Male indicating possible myocardial injury.   Clinicians would have to utilize clinical acumen, EKG, Troponin, and serial changes to determine if it is an Acute Myocardial Infarction or myocardial injury due to an underlying chronic condition.         D-dimer, Quantitative [[id number]]  (Abnormal) Collected: 01/17/24 2023    Specimen: Blood Updated: 01/17/24 2046     D-Dimer, Quantitative 2.54 mg/L (FEU)     Narrative:      According to the assay 's published package insert, a normal (<0.50 mg/L (FEU)) D-dimer result in conjunction with a non-high clinical probability assessment, excludes deep vein thrombosis (DVT) and pulmonary embolism (PE) with high sensitivity.    D-dimer values increase with age and this can make VTE exclusion of an older population difficult. To address this, the [employer], based on best available evidence and recent guidelines, recommends that clinicians use age-adjusted D-dimer thresholds in patients greater than 50 years of age with: a) a low probability of PE  "who do not meet all Pulmonary Embolism Rule Out Criteria, or b) in those with intermediate probability of PE.   The formula for an age-adjusted D-dimer cut-off is \"age/100\".  For example, a 60 year old patient would have an age-adjusted cut-off of 0.60 mg/L (FEU) and an 80 year old 0.80 mg/L (FEU).    CBC & Differential [[id number]] Collected: 01/17/24 2023    Specimen: Blood Updated: 01/17/24 2123    Narrative:      The following orders were created for panel order CBC & Differential.  Procedure                               Abnormality         Status                     ---------                               -----------         ------                     CBC Auto Differential[[id number]]        Normal              Final result               Scan Slide[[id number]]                                       Final result                 Please view results for these tests on the individual orders.    Comprehensive Metabolic Panel [[id number]]  (Abnormal) Collected: 01/17/24 2023    Specimen: Blood Updated: 01/17/24 2054     Glucose 132 mg/dL      BUN 40 mg/dL      Creatinine 1.66 mg/dL      Sodium 137 mmol/L      Potassium 3.8 mmol/L      Chloride 102 mmol/L      CO2 19.0 mmol/L      Calcium 8.7 mg/dL      Total Protein 6.0 g/dL      Albumin 3.1 g/dL      ALT (SGPT) 48 U/L      AST (SGOT) 30 U/L      Alkaline Phosphatase 95 U/L      Total Bilirubin 0.6 mg/dL      Globulin 2.9 gm/dL      A/G Ratio 1.1 g/dL      BUN/Creatinine Ratio 24.1     Anion Gap 16.0 mmol/L      eGFR 34.3 mL/min/1.73     Narrative:      GFR Normal >60  Chronic Kidney Disease <60  Kidney Failure <15      BNP [[id number]]  (Abnormal) Collected: 01/17/24 2023    Specimen: Blood Updated: 01/17/24 2054     proBNP 5,544.0 pg/mL     Narrative:      This assay is used as an aid in the diagnosis of individuals suspected of having heart failure. It can be used as an aid in the diagnosis of acute decompensated heart failure (ADHF) in patients presenting with signs " and symptoms of ADHF to the emergency department (ED). In addition, NT-proBNP of <300 pg/mL indicates ADHF is not likely.    Age Range Result Interpretation  NT-proBNP Concentration (pg/mL:      <50             Positive            >450                   Gray                 300-450                    Negative             <300    50-75           Positive            >900                  Gray                300-900                  Negative            <300      >75             Positive            >1800                  Gray                300-1800                  Negative            <300    CBC Auto Differential [[id number]]  (Normal) Collected: 01/17/24 2023    Specimen: Blood Updated: 01/17/24 2123     WBC 6.40 10*3/mm3      RBC 4.62 10*6/mm3      Hemoglobin 13.8 g/dL      Hematocrit 43.2 %      MCV 93.5 fL      MCH 29.9 pg      MCHC 31.9 g/dL      RDW 14.7 %      RDW-SD 51.6 fl      MPV 8.9 fL      Platelets 283 10*3/mm3     Scan Slide [[id number]] Collected: 01/17/24 2023    Specimen: Blood Updated: 01/17/24 2123     Scan Slide --     Comment: See Manual Differential Results       Manual Differential [[id number]]  (Abnormal) Collected: 01/17/24 2023    Specimen: Blood Updated: 01/17/24 2123     Neutrophil % 36.0 %      Lymphocyte % 15.0 %      Monocyte % 17.0 %      Eosinophil % 1.0 %      Basophil % 2.0 %      Bands %  21.0 %      Metamyelocyte % 5.0 %      Myelocyte % 3.0 %      Neutrophils Absolute 3.65 10*3/mm3      Lymphocytes Absolute 0.96 10*3/mm3      Monocytes Absolute 1.09 10*3/mm3      Eosinophils Absolute 0.06 10*3/mm3      Basophils Absolute 0.13 10*3/mm3      RBC Morphology Normal     WBC Morphology Normal     Platelet Morphology Normal    Respiratory Panel PCR w/COVID-19(SARS-CoV-2) LAURA/AARTI/ITA/PAD/COR/IRMA In-House, NP Swab in UTM/VTM, 2 HR TAT - Swab, Nasopharynx [[id number]]  (Abnormal) Collected: 01/17/24 2032    Specimen: Swab from Nasopharynx Updated: 01/17/24 2156     ADENOVIRUS, PCR Not Detected      Coronavirus 229E Not Detected     Coronavirus HKU1 Not Detected     Coronavirus NL63 Not Detected     Coronavirus OC43 Not Detected     COVID19 Detected     Human Metapneumovirus Not Detected     Human Rhinovirus/Enterovirus Not Detected     Influenza A PCR Not Detected     Influenza B PCR Not Detected     Parainfluenza Virus 1 Not Detected     Parainfluenza Virus 2 Not Detected     Parainfluenza Virus 3 Detected     Parainfluenza Virus 4 Not Detected     RSV, PCR Not Detected     Bordetella pertussis pcr Not Detected     Bordetella parapertussis PCR Not Detected     Chlamydophila pneumoniae PCR Not Detected     Mycoplasma pneumo by PCR Not Detected    Narrative:      In the setting of a positive respiratory panel with a viral infection PLUS a negative procalcitonin without other underlying concern for bacterial infection, consider observing off antibiotics or discontinuation of antibiotics and continue supportive care. If the respiratory panel is positive for atypical bacterial infection (Bordetella pertussis, Chlamydophila pneumoniae, or Mycoplasma pneumoniae), consider antibiotic de-escalation to target atypical bacterial infection.    Blood Culture - Blood, Arm, Left [[id number]] Collected: 01/17/24 2122    Specimen: Blood from Arm, Left Updated: 01/17/24 2124    High Sensitivity Troponin T 2Hr [[id number]]  (Abnormal) Collected: 01/17/24 2136    Specimen: Blood from Arm, Left Updated: 01/17/24 2207     HS Troponin T 27 ng/L      Troponin T Delta 12 ng/L     Narrative:      High Sensitive Troponin T Reference Range:  <14.0 ng/L- Negative Female for AMI  <22.0 ng/L- Negative Male for AMI  >=14 - Abnormal Female indicating possible myocardial injury.  >=22 - Abnormal Male indicating possible myocardial injury.   Clinicians would have to utilize clinical acumen, EKG, Troponin, and serial changes to determine if it is an Acute Myocardial Infarction or myocardial injury due to an underlying chronic condition.       "   Procalcitonin [[id number]]  (Abnormal) Collected: 01/17/24 2136    Specimen: Blood from Arm, Left Updated: 01/17/24 2244     Procalcitonin 12.32 ng/mL     Narrative:      As a Marker for Sepsis (Non-Neonates):    1. <0.5 ng/mL represents a low risk of severe sepsis and/or septic shock.  2. >2 ng/mL represents a high risk of severe sepsis and/or septic shock.    As a Marker for Lower Respiratory Tract Infections that require antibiotic therapy:    PCT on Admission    Antibiotic Therapy       6-12 Hrs later    >0.5                Strongly Recommended  >0.25 - <0.5        Recommended   0.1 - 0.25          Discouraged              Remeasure/reassess PCT  <0.1                Strongly Discouraged     Remeasure/reassess PCT    As 28 day mortality risk marker: \"Change in Procalcitonin Result\" (>80% or <=80%) if Day 0 (or Day 1) and Day 4 values are available. Refer to [email or web address]    Change in PCT <=80%  A decrease of PCT levels below or equal to 80% defines a positive change in PCT test result representing a higher risk for 28-day all-cause mortality of patients diagnosed with severe sepsis for septic shock.    Change in PCT >80%  A decrease of PCT levels of more than 80% defines a negative change in PCT result representing a lower risk for 28-day all-cause mortality of patients diagnosed with severe sepsis or septic shock.       Magnesium [[id number]]  (Abnormal) Collected: 01/17/24 2136    Specimen: Blood from Arm, Left Updated: 01/18/24 0016     Magnesium 1.5 mg/dL     POC Lactate [[id number]]  (Abnormal) Collected: 01/17/24 2141    Specimen: Blood Updated: 01/17/24 2143     Lactate 4.4 mmol/L      Comment: Serial Number: [id number]:  [id number]       Blood Culture - Blood, Arm, Right [[id number]] Collected: 01/17/24 2144    Specimen: Blood from Arm, Right Updated: 01/17/24 2147    Blood Gas, Arterial - [[id number]]  (Abnormal) Collected: 01/17/24 2212    Specimen: Arterial Blood Updated: " 01/17/24 2215     Site Right Radial     Flo's Test Positive     pH, Arterial 7.304 pH units      pCO2, Arterial 34.7 mm Hg      pO2, Arterial 82.1 mm Hg      HCO3, Arterial 17.2 mmol/L      Base Excess, Arterial -8.3 mmol/L      Comment: Serial Number: [id number]:  [id number]        O2 Saturation, Arterial 95.0 %      CO2 Content 18.3 mmol/L      Barometric Pressure for Blood Gas --     Comment: N/A        Modality Cannula     FIO2 40 %      Hemodilution No    STAT Lactic Acid, Reflex [[id number]]  (Abnormal) Collected: 01/17/24 2237    Specimen: Blood Updated: 01/17/24 2311     Lactate 4.4 mmol/L     Protime-INR [[id number]]  (Abnormal) Collected: 01/17/24 2237    Specimen: Blood Updated: 01/17/24 2301     Protime 16.1 Seconds      INR 1.52    aPTT [[id number]]  (Abnormal) Collected: 01/17/24 2237    Specimen: Blood Updated: 01/17/24 2301     PTT 35.0 seconds     POC Glucose Once [[id number]]  (Abnormal) Collected: 01/17/24 2352    Specimen: Blood Updated: 01/17/24 2353     Glucose 119 mg/dL      Comment: Serial Number: [id number]:  [id number]       MRSA Screen, PCR (Inpatient) - Swab, Nares [[id number]]  (Abnormal) Collected: 01/17/24 2354    Specimen: Swab from Nares Updated: 01/18/24 0133     MRSA PCR MRSA Detected    Narrative:      The negative predictive value of this diagnostic test is high and should only be used to consider de-escalating anti-MRSA therapy. A positive result may indicate colonization with MRSA and must be correlated clinically.    Blood Gas, Arterial - [[id number]]  (Abnormal) Collected: 01/17/24 2355    Specimen: Arterial Blood Updated: 01/18/24 0038     Site Right Brachial     Flo's Test N/A     pH, Arterial 6.854 pH units      pCO2, Arterial 127.9 mm Hg      pO2, Arterial 118.1 mm Hg      HCO3, Arterial 22.5 mmol/L      Base Excess, Arterial -14.0 mmol/L      Comment: Serial Number: [id number]:  [id number]        O2 Saturation, Arterial 92.4 %      Barometric Pressure for Blood Gas --      Comment: N/A        Modality NRB     FIO2 100 %      Hemodilution No    POC Creatinine [[id number]]  (Abnormal) Collected: 01/17/24 2355    Specimen: Arterial Blood Updated: 01/17/24 2359     Creatinine 1.11 mg/dL      Comment: Serial Number: [id number]:  [id number]        eGFR 55.6 mL/min/1.73     POCT Electrolytes +HGB +HCT [[id number]]  (Abnormal) Collected: 01/17/24 2355    Specimen: Arterial Blood Updated: 01/17/24 2359     Sodium 143 mmol/L      POC Potassium 4.4 mmol/L      Ionized Calcium 1.16 mmol/L      Comment: Serial Number: [id number]:  [id number]        Glucose 123 mg/dL      Hematocrit 42 %      Hemoglobin 14.1 g/dL     POC Lactate [[id number]]  (Abnormal) Collected: 01/17/24 2355    Specimen: Arterial Blood Updated: 01/18/24 0038     Lactate 3.4 mmol/L      Comment: Serial Number: [id number]:  [id number]       POC Glucose Once [[id number]]  (Abnormal) Collected: 01/17/24 2355    Specimen: Arterial Blood Updated: 01/17/24 2359     Glucose 123 mg/dL      Comment: Serial Number: [id number]:  [id number]       Blood Gas, Arterial -Obtain on: Current Settings [[id number]]  (Abnormal) Collected: 01/18/24 0144    Specimen: Arterial Blood Updated: 01/18/24 0152     Site Right Brachial     Flo's Test Positive     pH, Arterial 6.911 pH units      pCO2, Arterial 82.3 mm Hg      pO2, Arterial 208.6 mm Hg      HCO3, Arterial 16.6 mmol/L      Base Excess, Arterial -17.8 mmol/L      Comment: Serial Number: [id number]:  [id number]        O2 Saturation, Arterial 98.8 %      CO2 Content 19.1 mmol/L      Barometric Pressure for Blood Gas --     Comment: N/A        Modality Adult Vent     FIO2 100 %      Ventilator Mode AC     Set Tidal Volume 500     PEEP 5     Hemodilution No     Respiratory Rate 26    POC Glucose Once [[id number]]  (Normal) Collected: 01/18/24 0247    Specimen: Blood Updated: 01/18/24 0248     Glucose 82 mg/dL      Comment: Serial Number: [id number]:  [id number]               MICRO:  All cultures  reviewed and negative, or pending with no growth unless otherwise designated in chart below.  Microbiology Results Abnormal       None               RADIOLOGY:  XR Chest 1 View    Result Date: 1/18/2024  Impression: ET tube in the midthoracic trachea. Slight worsening of diffuse left lung pneumonia Electronically Signed: Nilesh Fairbanks MD  1/18/2024 12:49 AM EST  Workstation ID: [id number]    CT Angiogram Chest Pulmonary Embolism    Result Date: 1/17/2024  Impression: Limited study due to motion artifact. No evidence of pulmonary embolism. No evidence of thoracic aortic aneurysm or dissection. Advanced emphysematous changes. Extensive airspace disease throughout the left upper lobe compatible with pneumonia. Electronically Signed: Jun Sifuentes MD  1/17/2024 10:38 PM EST  Workstation ID: [id number]    XR Chest 1 View    Result Date: 1/17/2024  Impression: New left-sided airspace disease consistent with pneumonia. Electronically Signed: Tomi Wright MD  1/17/2024 8:40 PM EST  Workstation ID: [id number]     For more specific information regarding this patient’s hospital stay at [facility], please refer to patient’s full medical record.  This summary has been culminated by this nurse practitioner on behalf of the critical care team at the request of Dr. Farrar.    Electronically signed by JEFF Shrestha, 01/18/24 at 05:16 EST.       Electronically signed by Neva Pritchard APRN at 01/18/24 0525       Discharge Order (From admission, onward)      None

## 2024-01-18 NOTE — NURSING NOTE
JEFF Greene and MD Guido at bedside at this time with RT evaluating pt.  PT continues to hyperventilate with hypotension.  PT possibly will be tubed depending on the results of the ABG.

## 2024-01-18 NOTE — PROGRESS NOTES
"Pharmacy Antimicrobial Dosing Service    Subjective:  Justa Coles is a 63 y.o.female admitted with septic shock. Pharmacy has been consulted to dose Vancomycin for possible sepsis.      Assessment/Plan    1. Day #1/5 Vancomycin: Pulse dosing d/t renal dysfxn. Pt received 1500mg (~21mg/kg DBW) IV once on 1/17. Will obtain random level on 1/18 with AM labs and plan to re-dose when level is expected to be <20mcg/mL.     2. Day #1/5 Cefepime: 2g IV q12h for estCrCl 30-59 mL/min.    Will continue to monitor drug levels, renal function, culture and sensitivities, and patient clinical status.       Objective:  Relevant clinical data and objective history reviewed:  162.6 cm (64\")   98 kg (216 lb 0.8 oz)   Ideal body weight: 54.7 kg (120 lb 9.5 oz)  Adjusted ideal body weight: 72 kg (158 lb 12.4 oz)  Body mass index is 37.09 kg/m².        Results from last 7 days   Lab Units 01/17/24 2023   CREATININE mg/dL 1.66*     Estimated Creatinine Clearance: 38.9 mL/min (A) (by C-G formula based on SCr of 1.66 mg/dL (H)).  No intake/output data recorded.    Results from last 7 days   Lab Units 01/17/24 2023   WBC 10*3/mm3 6.40     Temperature    01/17/24 2002 01/17/24 2041   Temp: 98.1 °F (36.7 °C) (!) 100.6 °F (38.1 °C)     Baseline culture/source/susceptibility:  Microbiology Results (last 10 days)       Procedure Component Value - Date/Time    Respiratory Panel PCR w/COVID-19(SARS-CoV-2) LAURA/AARTI/ITA/PAD/COR/IRMA In-House, NP Swab in UTM/VTM, 2 HR TAT - Swab, Nasopharynx [[id number]]  (Abnormal) Collected: 01/17/24 2032    Lab Status: Final result Specimen: Swab from Nasopharynx Updated: 01/17/24 2156     ADENOVIRUS, PCR Not Detected     Coronavirus 229E Not Detected     Coronavirus HKU1 Not Detected     Coronavirus NL63 Not Detected     Coronavirus OC43 Not Detected     COVID19 Detected     Human Metapneumovirus Not Detected     Human Rhinovirus/Enterovirus Not Detected     Influenza A PCR Not Detected     Influenza B PCR Not " Detected     Parainfluenza Virus 1 Not Detected     Parainfluenza Virus 2 Not Detected     Parainfluenza Virus 3 Detected     Parainfluenza Virus 4 Not Detected     RSV, PCR Not Detected     Bordetella pertussis pcr Not Detected     Bordetella parapertussis PCR Not Detected     Chlamydophila pneumoniae PCR Not Detected     Mycoplasma pneumo by PCR Not Detected    Narrative:      In the setting of a positive respiratory panel with a viral infection PLUS a negative procalcitonin without other underlying concern for bacterial infection, consider observing off antibiotics or discontinuation of antibiotics and continue supportive care. If the respiratory panel is positive for atypical bacterial infection (Bordetella pertussis, Chlamydophila pneumoniae, or Mycoplasma pneumoniae), consider antibiotic de-escalation to target atypical bacterial infection.            Citlaly Gamez, PharmD  01/17/24 23:12 EST

## 2024-01-18 NOTE — PROCEDURES
"Intubation    Date/Time: 1/18/2024 12:10 AM    Performed by: Neva Pritchard APRN  Authorized by: Neva Pritchard APRN  Consent: The procedure was performed in an emergent situation.  Patient identity confirmed: arm band, provided demographic data, anonymous protocol, patient vented/unresponsive and hospital-assigned identification number  Time out: Immediately prior to procedure a \"time out\" was called to verify the correct patient, procedure, equipment, support staff and site/side marked as required.  Indications: respiratory failure  Intubation method: glidescope.  Sedatives: fentanyl, etomidate and midazolam  Laryngoscope size: Mac 3  Tube size: 7.5 mm  Tube type: cuffed  Number of attempts: 2  Ventilation between attempts: BVM  Cricoid pressure: yes  Cords visualized: yes  Post-procedure assessment: CO2 detector  Breath sounds: equal and reduced on right  Cuff inflated: yes  ETT to lip: 22 cm  Tube secured with: ETT weldon  Chest x-ray interpreted by me.  Chest x-ray findings: endotracheal tube in appropriate position  Patient tolerance: patient tolerated the procedure well with no immediate complications      Electronically signed by JEFF Shrestha, 01/18/24, 12:37 AM EST.    "

## 2024-01-18 NOTE — ED PROVIDER NOTES
Subjective   History of Present Illness  Patient is a 63-year-old female who comes in with severe left-sided chest pain tachypneic short of breath and tachycardic in acute distress      Review of Systems   Constitutional:  Positive for chills and fever. Negative for fatigue.   HENT:  Negative for congestion, tinnitus and trouble swallowing.    Eyes:  Negative for photophobia, discharge and redness.   Respiratory:  Positive for chest tightness and shortness of breath. Negative for cough.    Cardiovascular:  Positive for chest pain. Negative for palpitations.   Gastrointestinal:  Negative for abdominal pain, diarrhea, nausea and vomiting.   Genitourinary:  Negative for dysuria, frequency and urgency.   Musculoskeletal:  Negative for back pain, joint swelling and myalgias.   Skin:  Negative for rash.   Neurological:  Negative for dizziness and headaches.   Psychiatric/Behavioral:  Negative for confusion. The patient is nervous/anxious.    All other systems reviewed and are negative.      Past Medical History:   Diagnosis Date    Chronic obstructive pulmonary disease 2020    Former smoker.  Continue Breo 200-25 mcg daily and as needed albuterol.  Vaccinated against S. Pneumonia & COVID-19.  COPD action plan in AVS.    Graves disease     Hyperlipidemia     Hyperthyroidism 2022    Mixed anxiety depressive disorder 2016    Osteoarthritis     SLE (systemic lupus erythematosus) 2018    Positive RPN antibodies.  Previously evaluated by rheumatology; evaluation was inconclusive.  Patient complains of diffuse muscle cramping that waxes and wanes and is exacerbated by patient's line of work.  Patient declined further evaluation.    Vitamin D deficiency 2018       Allergies   Allergen Reactions    Sulfa Antibiotics Hives       Past Surgical History:   Procedure Laterality Date    APPENDECTOMY      BREAST BIOPSY Left 2020    stereo     SECTION  1982, ,     EXCISION BAKERS CYST KNEE  Left     POSTPARTUM TUBAL LIGATION  1993    TONSILLECTOMY      TUBAL ABDOMINAL LIGATION         Family History   Problem Relation Age of Onset    Colon cancer Father     Colon cancer Maternal Grandmother     Colon cancer Maternal Grandfather     Colon cancer Paternal Grandfather        Social History     Socioeconomic History    Marital status:    Tobacco Use    Smoking status: Former     Packs/day: 1.00     Years: 20.00     Additional pack years: 0.00     Total pack years: 20.00     Types: Cigarettes     Quit date: 2011     Years since quittin.6     Passive exposure: Current    Smokeless tobacco: Never   Vaping Use    Vaping Use: Never used   Substance and Sexual Activity    Alcohol use: Not Currently    Drug use: Never    Sexual activity: Defer           Objective   Physical Exam  Vitals reviewed.   Constitutional:       Appearance: She is well-developed.      Comments: Cachectic   HENT:      Head: Normocephalic and atraumatic.   Eyes:      General: Lids are normal.      Conjunctiva/sclera: Conjunctivae normal.      Pupils: Pupils are equal, round, and reactive to light.   Cardiovascular:      Rate and Rhythm: Normal rate and regular rhythm.      Pulses:           Carotid pulses are 1+ on the right side and 1+ on the left side.       Radial pulses are 1+ on the right side and 1+ on the left side.        Dorsalis pedis pulses are 1+ on the right side and 1+ on the left side.        Posterior tibial pulses are 1+ on the right side and 1+ on the left side.      Heart sounds: Normal heart sounds.   Pulmonary:      Effort: Pulmonary effort is normal. No respiratory distress.      Breath sounds: Normal breath sounds. Examination of the left-upper field reveals decreased breath sounds and wheezing. Examination of the left-middle field reveals decreased breath sounds. Examination of the right-lower field reveals decreased breath sounds. Examination of the left-lower field reveals decreased breath sounds and  "wheezing. No wheezing.   Chest:      Chest wall: Tenderness present. No swelling, crepitus or edema.          Comments: Left lateral chest wall tender to palpation  Abdominal:      General: Bowel sounds are normal. There is no distension.      Palpations: Abdomen is soft. There is no mass.      Tenderness: There is no abdominal tenderness. There is no guarding or rebound.   Musculoskeletal:         General: No deformity. Normal range of motion.      Cervical back: Normal range of motion and neck supple.      Comments:  extremely kyphotic   Skin:     General: Skin is warm and dry.      Capillary Refill: Capillary refill takes 2 to 3 seconds.   Neurological:      General: No focal deficit present.      Mental Status: She is alert and oriented to person, place, and time.      GCS: GCS eye subscore is 4. GCS verbal subscore is 5. GCS motor subscore is 6.      Cranial Nerves: No cranial nerve deficit.      Sensory: No sensory deficit.      Deep Tendon Reflexes: Reflexes normal.         Procedures       EKG was interpreted by myself as well as Dr. Guido as sinus tach with a rate of 150 no ectopy no ST elevation it was compared to the previous dated 8/20/2023 where she was sinus tach with a rate of 114 the patient was febrile at the time of this EKG    ED Course  ED Course as of 01/18/24 0034   Wed Jan 17, 2024 2037 Rectal temp 100.6 [KW]   2054 Reports she has had eight 500 mg Tylenol since midnight last night [KW]   2127 Ready for CT [KW]   2209 Dr. Guido at Bedside [KW]   2211 /82 on 0.1mcg/ kg / min Levophed [KW]   2223 Spoke with Delilah the nurse practitioner with the intensivist and admitted to Dr. Farrar [KW]      ED Course User Index  [KW] Annemarie Hussein, APRN    /68   Pulse (!) 143   Temp 97.6 °F (36.4 °C) (Oral)   Resp (!) 32   Ht 162.6 cm (64\")   Wt 44.8 kg (98 lb 12.3 oz)   SpO2 92%   BMI 16.95 kg/m²   Labs Reviewed   RESPIRATORY PANEL PCR W/ COVID-19 (SARS-COV-2), NP SWAB IN " UTM/VTP, 2 HR TAT - Abnormal; Notable for the following components:       Result Value    COVID19 Detected (*)     Parainfluenza Virus 3 Detected (*)     All other components within normal limits    Narrative:     In the setting of a positive respiratory panel with a viral infection PLUS a negative procalcitonin without other underlying concern for bacterial infection, consider observing off antibiotics or discontinuation of antibiotics and continue supportive care. If the respiratory panel is positive for atypical bacterial infection (Bordetella pertussis, Chlamydophila pneumoniae, or Mycoplasma pneumoniae), consider antibiotic de-escalation to target atypical bacterial infection.   TROPONIN - Abnormal; Notable for the following components:    HS Troponin T 15 (*)     All other components within normal limits    Narrative:     High Sensitive Troponin T Reference Range:  <14.0 ng/L- Negative Female for AMI  <22.0 ng/L- Negative Male for AMI  >=14 - Abnormal Female indicating possible myocardial injury.  >=22 - Abnormal Male indicating possible myocardial injury.   Clinicians would have to utilize clinical acumen, EKG, Troponin, and serial changes to determine if it is an Acute Myocardial Infarction or myocardial injury due to an underlying chronic condition.        D-DIMER, QUANTITATIVE - Abnormal; Notable for the following components:    D-Dimer, Quantitative 2.54 (*)     All other components within normal limits    Narrative:     According to the assay 's published package insert, a normal (<0.50 mg/L (FEU)) D-dimer result in conjunction with a non-high clinical probability assessment, excludes deep vein thrombosis (DVT) and pulmonary embolism (PE) with high sensitivity.    D-dimer values increase with age and this can make VTE exclusion of an older population difficult. To address this, the [employer], based on best available evidence and recent guidelines, recommends that  "clinicians use age-adjusted D-dimer thresholds in patients greater than 50 years of age with: a) a low probability of PE who do not meet all Pulmonary Embolism Rule Out Criteria, or b) in those with intermediate probability of PE.   The formula for an age-adjusted D-dimer cut-off is \"age/100\".  For example, a 60 year old patient would have an age-adjusted cut-off of 0.60 mg/L (FEU) and an 80 year old 0.80 mg/L (FEU).   COMPREHENSIVE METABOLIC PANEL - Abnormal; Notable for the following components:    Glucose 132 (*)     BUN 40 (*)     Creatinine 1.66 (*)     CO2 19.0 (*)     Albumin 3.1 (*)     ALT (SGPT) 48 (*)     Anion Gap 16.0 (*)     eGFR 34.3 (*)     All other components within normal limits    Narrative:     GFR Normal >60  Chronic Kidney Disease <60  Kidney Failure <15     BNP (IN-HOUSE) - Abnormal; Notable for the following components:    proBNP 5,544.0 (*)     All other components within normal limits    Narrative:     This assay is used as an aid in the diagnosis of individuals suspected of having heart failure. It can be used as an aid in the diagnosis of acute decompensated heart failure (ADHF) in patients presenting with signs and symptoms of ADHF to the emergency department (ED). In addition, NT-proBNP of <300 pg/mL indicates ADHF is not likely.    Age Range Result Interpretation  NT-proBNP Concentration (pg/mL:      <50             Positive            >450                   Gray                 300-450                    Negative             <300    50-75           Positive            >900                  Gray                300-900                  Negative            <300      >75             Positive            >1800                  Gray                300-1800                  Negative            <300   HIGH SENSITIVITIY TROPONIN T 2HR - Abnormal; Notable for the following components:    HS Troponin T 27 (*)     Troponin T Delta 12 (*)     All other components within normal limits    Narrative: " "    High Sensitive Troponin T Reference Range:  <14.0 ng/L- Negative Female for AMI  <22.0 ng/L- Negative Male for AMI  >=14 - Abnormal Female indicating possible myocardial injury.  >=22 - Abnormal Male indicating possible myocardial injury.   Clinicians would have to utilize clinical acumen, EKG, Troponin, and serial changes to determine if it is an Acute Myocardial Infarction or myocardial injury due to an underlying chronic condition.        MANUAL DIFFERENTIAL - Abnormal; Notable for the following components:    Neutrophil % 36.0 (*)     Lymphocyte % 15.0 (*)     Monocyte % 17.0 (*)     Basophil % 2.0 (*)     Bands %  21.0 (*)     Metamyelocyte % 5.0 (*)     Myelocyte % 3.0 (*)     Monocytes Absolute 1.09 (*)     All other components within normal limits   LACTIC ACID, REFLEX - Abnormal; Notable for the following components:    Lactate 4.4 (*)     All other components within normal limits   PROTIME-INR - Abnormal; Notable for the following components:    Protime 16.1 (*)     INR 1.52 (*)     All other components within normal limits   APTT - Abnormal; Notable for the following components:    PTT 35.0 (*)     All other components within normal limits   PROCALCITONIN - Abnormal; Notable for the following components:    Procalcitonin 12.32 (*)     All other components within normal limits    Narrative:     As a Marker for Sepsis (Non-Neonates):    1. <0.5 ng/mL represents a low risk of severe sepsis and/or septic shock.  2. >2 ng/mL represents a high risk of severe sepsis and/or septic shock.    As a Marker for Lower Respiratory Tract Infections that require antibiotic therapy:    PCT on Admission    Antibiotic Therapy       6-12 Hrs later    >0.5                Strongly Recommended  >0.25 - <0.5        Recommended   0.1 - 0.25          Discouraged              Remeasure/reassess PCT  <0.1                Strongly Discouraged     Remeasure/reassess PCT    As 28 day mortality risk marker: \"Change in Procalcitonin " "Result\" (>80% or <=80%) if Day 0 (or Day 1) and Day 4 values are available. Refer to http://www.[facility]-pct-calculator.com    Change in PCT <=80%  A decrease of PCT levels below or equal to 80% defines a positive change in PCT test result representing a higher risk for 28-day all-cause mortality of patients diagnosed with severe sepsis for septic shock.    Change in PCT >80%  A decrease of PCT levels of more than 80% defines a negative change in PCT result representing a lower risk for 28-day all-cause mortality of patients diagnosed with severe sepsis or septic shock.      BLOOD GAS, ARTERIAL - Abnormal; Notable for the following components:    pH, Arterial 7.304 (*)     pCO2, Arterial 34.7 (*)     pO2, Arterial 82.1 (*)     HCO3, Arterial 17.2 (*)     Base Excess, Arterial -8.3 (*)     CO2 Content 18.3 (*)     All other components within normal limits   MAGNESIUM - Abnormal; Notable for the following components:    Magnesium 1.5 (*)     All other components within normal limits   POC LACTATE - Abnormal; Notable for the following components:    Lactate 4.4 (*)     All other components within normal limits   POCT GLUCOSE FINGERSTICK - Abnormal; Notable for the following components:    Glucose 119 (*)     All other components within normal limits   POCT CREATININE - Abnormal; Notable for the following components:    eGFR 55.6 (*)     All other components within normal limits   ELECTROLYTES + H&H - Abnormal; Notable for the following components:    Glucose 123 (*)     All other components within normal limits   POCT GLUCOSE FINGERSTICK - Abnormal; Notable for the following components:    Glucose 123 (*)     All other components within normal limits   CBC WITH AUTO DIFFERENTIAL - Normal   BLOOD CULTURE   BLOOD CULTURE   MRSA SCREEN, PCR   SCAN SLIDE   BLOOD GAS, ARTERIAL   MAGNESIUM   PHOSPHORUS   COMPREHENSIVE METABOLIC PANEL   CALCIUM, IONIZED   LIPID PANEL   TSH   CBC WITH AUTO DIFFERENTIAL   VANCOMYCIN, RANDOM   T4, " FREE   BLOOD GAS, ARTERIAL   POC LACTATE   POC LACTATE   CBC AND DIFFERENTIAL    Narrative:     The following orders were created for panel order CBC & Differential.  Procedure                               Abnormality         Status                     ---------                               -----------         ------                     CBC Auto Differential[[id number]]        Normal              Final result               Scan Slide[[id number]]                                       Final result                 Please view results for these tests on the individual orders.   CBC AND DIFFERENTIAL    Narrative:     The following orders were created for panel order CBC & Differential.  Procedure                               Abnormality         Status                     ---------                               -----------         ------                     CBC Auto Differential[[id number]]                                                         Please view results for these tests on the individual orders.     Medications   sodium chloride 0.9 % flush 10 mL (has no administration in time range)   ipratropium-albuterol (DUO-NEB) nebulizer solution 3 mL (3 mL Nebulization Not Given 1/17/24 2212)   norepinephrine (LEVOPHED) 8 mg in 250 mL NS infusion (premix) (0 mcg/kg/min × 98 kg Intravenous Stopped 1/17/24 2354)   sodium chloride 0.9 % flush 10 mL (has no administration in time range)   ipratropium (ATROVENT HFA) inhaler 2 puff (has no administration in time range)   nitroglycerin (NITROSTAT) SL tablet 0.4 mg (has no administration in time range)   sodium chloride 0.9 % flush 10 mL (10 mL Intravenous Given 1/17/24 2355)   sodium chloride 0.9 % flush 10 mL (has no administration in time range)   sodium chloride 0.9 % infusion 40 mL (has no administration in time range)   ipratropium-albuterol (DUO-NEB) nebulizer solution 3 mL (has no administration in time range)   sennosides-docusate (PERICOLACE) 8.6-50 MG per tablet 2  tablet (2 tablets Oral Not Given 1/17/24 2350)     And   polyethylene glycol (MIRALAX) packet 17 g (has no administration in time range)     And   bisacodyl (DULCOLAX) EC tablet 5 mg (has no administration in time range)     And   bisacodyl (DULCOLAX) suppository 10 mg (has no administration in time range)   acetaminophen (TYLENOL) tablet 650 mg (has no administration in time range)     Or   acetaminophen (TYLENOL) suppository 650 mg (has no administration in time range)   ondansetron ODT (ZOFRAN-ODT) disintegrating tablet 4 mg (has no administration in time range)     Or   ondansetron (ZOFRAN) injection 4 mg (has no administration in time range)   Potassium Replacement - Follow Nurse / BPA Driven Protocol (has no administration in time range)   Magnesium Low Dose Replacement - Follow Nurse / BPA Driven Protocol (has no administration in time range)   Phosphorus Replacement - Follow Nurse / BPA Driven Protocol (has no administration in time range)   Calcium Replacement - Follow Nurse / BPA Driven Protocol (has no administration in time range)   heparin (porcine) 5000 UNIT/ML injection 5,000 Units (has no administration in time range)   cefepime 2 gm IVPB in 100 ml NS (MBP) (has no administration in time range)   Pharmacy to dose vancomycin (has no administration in time range)   methylPREDNISolone sodium succinate (SOLU-Medrol) injection 60 mg (has no administration in time range)   morphine injection 2 mg (has no administration in time range)   Vancomycin Pharmacy Intermittent/Pulse Dosing (has no administration in time range)   fentaNYL citrate (PF) (SUBLIMAZE) injection 25 mcg ( Intravenous Canceled Entry 1/18/24 0017)   etomidate (AMIDATE) 2 MG/ML injection  - ADS Override Pull (  Canceled Entry 1/18/24 0017)   midazolam (VERSED) 2 MG/2ML injection  - ADS Override Pull (  Canceled Entry 1/18/24 0017)   sodium chloride 0.9 % bolus 1,000 mL (0 mL Intravenous Stopped 1/17/24 2052)   ibuprofen (ADVIL,MOTRIN) tablet  600 mg (600 mg Oral Given 1/17/24 2057)   sodium chloride 0.9 % bolus 1,000 mL (0 mL Intravenous Stopped 1/17/24 2150)   cefepime 2 gm IVPB in 100 ml NS (MBP) (2,000 mg Intravenous New Bag 1/17/24 2211)   vancomycin IVPB 1500 mg in 0.9% NaCl (Premix) 500 mL (1,500 mg Intravenous New Bag 1/17/24 2215)   sepsis fluid NS 0.9 % bolus 2,160 mL (2,160 mL Intravenous Not Given 1/18/24 0018)   methylPREDNISolone sodium succinate (SOLU-Medrol) injection 125 mg (125 mg Intravenous Given 1/17/24 2154)   iopamidol (ISOVUE-370) 76 % injection 100 mL (100 mL Intravenous Given 1/17/24 2234)   etomidate (AMIDATE) injection 13.44 mg (10 mg Intravenous Given 1/18/24 0010)   midazolam (VERSED) injection 4 mg (2 mg Intravenous Given 1/18/24 0008)   fentaNYL citrate (PF) (SUBLIMAZE) 50 mcg/mL injection  - ADS Override Pull (25 mcg  Given 1/18/24 0008)   furosemide (LASIX) injection 40 mg (40 mg Intravenous Given 1/18/24 0018)     CT Angiogram Chest Pulmonary Embolism    Result Date: 1/17/2024  Impression: Limited study due to motion artifact. No evidence of pulmonary embolism. No evidence of thoracic aortic aneurysm or dissection. Advanced emphysematous changes. Extensive airspace disease throughout the left upper lobe compatible with pneumonia. Electronically Signed: Jun Sifuentes MD  1/17/2024 10:38 PM EST  Workstation ID: [id number]    XR Chest 1 View    Result Date: 1/17/2024  Impression: New left-sided airspace disease consistent with pneumonia. Electronically Signed: Tomi Wright MD  1/17/2024 8:40 PM EST  Workstation ID: [id number]                                  SEPTIC SHOCK FOCUSED EXAM ATTESTATION    I attest that I have reassessed tissue perfusion after the fluid bolus given.    Annemarie Hussein, APRN  01/18/24  00:34 EST            Medical Decision Making  The high probability of sudden, clinically significant deterioration in the patient's condition required the highest level of my preparedness to intervene  urgently.  The services I provided to this patient were to treat and/or prevent clinically significant deterioration that could result in: death . Services included the following: chart data review, reviewing nurses notes an/or old charts, documentation time, consultant collaboration regarding findings and treatment options, vital sign assessments and ordering, interpreting and reviewing diagnostic studies/lab test.  Aggregate critical care time was death , which includes only time during which I was engaged in work directly related to the patient's care, as described above, whether at the bedside or elsewhere in the Emergency Department. It did not include time spent performing other procedure.    Patient is a 63-year-old female who came in in acute distress with anxiety severe left chest wall pain and shortness of breath.  Concerning for flu COVID RSV pneumonia COPD exacerbation-patient had IV established and blood work was obtained the patient was found to have severe bandemia at 21% the white count was only 6.4 but her lactic was 4.4 and she meets severe sepsis protocol she was given cefepime and Vanco-she was given the full sepsis fluid bolus and was reassessed after fluid bolus.  The patient was given Solu-Medrol and PCR panel shows she is positive for COVID-19 as well as parainfluenza-her chest x-ray also shows an extensive left-sided pneumonia the patient subsequently had an elevated dimer at 2.54 and after she was medically stable she was taken to CT where she had a CT which was read by myself as well as the radiologist as having no pulmonary emboli no aortic or thoracic dissection does have advanced emphysematous changes and extensive airspace disease throughout the left upper lobe consistent with pneumonia.  The patient had an ABG which showed some metabolic acidosis her oxygen saturation was stable at this time she continued to be tachycardic her blood pressure continued to be low requiring the use of  Levophed-the patient's blood pressure increased and she was maintaining her oxygen saturation when she was discussed with Delilah the nurse practitioner with the intensivist who agreed to admit to the ICU  Was stable at the time of admission to ICU        Problems Addressed:  Bandemia: complicated acute illness or injury  Chest pain, unspecified type: complicated acute illness or injury  COVID-19: complicated acute illness or injury  Dyspnea, unspecified type: complicated acute illness or injury  Fever and chills: complicated acute illness or injury  Parainfluenza: complicated acute illness or injury  Pneumonia of left lung due to infectious organism, unspecified part of lung: complicated acute illness or injury  Renal insufficiency: complicated acute illness or injury  Sepsis with acute hypoxic respiratory failure and septic shock, due to unspecified organism: complicated acute illness or injury    Amount and/or Complexity of Data Reviewed  External Data Reviewed: labs, radiology and notes.  Labs: ordered. Decision-making details documented in ED Course.  Radiology: ordered and independent interpretation performed. Decision-making details documented in ED Course.  ECG/medicine tests: ordered and independent interpretation performed. Decision-making details documented in ED Course.    Risk  OTC drugs.  Prescription drug management.  Decision regarding hospitalization.    Critical Care  Total time providing critical care: 45 minutes        Final diagnoses:   Fever and chills   Chest pain, unspecified type   Dyspnea, unspecified type   Pneumonia of left lung due to infectious organism, unspecified part of lung   Renal insufficiency   COVID-19   Sepsis with acute hypoxic respiratory failure and septic shock, due to unspecified organism   Bandemia   Parainfluenza       ED Disposition  ED Disposition       ED Disposition   Decision to Admit    Condition   --    Comment   Level of Care: Critical Care [6]   Diagnosis: Septic  shock [[id number]]   Admitting Physician: SUSAN PEOPLES [[id number]]   Attending Physician: SUSAN PEOPLES [[id number]]   Certification: I Certify That Inpatient Hospital Services Are Medically Necessary For Greater Than 2 Midnights                 No follow-up provider specified.       Medication List      No changes were made to your prescriptions during this visit.            Annemarie Hussein, APRN  01/18/24 0034

## 2024-01-18 NOTE — DISCHARGE SUMMARY
CRITICAL CARE DEATH SUMMARY    PATIENT NAME:     Justa Coles  :     1960  MRN:     [id number]    ADMISSION DATE:  2024      DATE/TIME OF DEATH:    2024 at 3:18 AM       CAUSE OF DEATH  Septic shock compounded by COVID-19, parainfluenza, flash pulmonary edema, COPD, and ALEXANDRA      FINAL DIAGNOSES  Septic shock  COVID-19 pneumonia  Parainfluenza  Flash pulmonary edema  COPD with continued smoking  Acute respiratory failure  Acute kidney injury  Hypomagnesemia   Malnutrition  Hyperthyroidism  Systemic lupus  Hypertension  Hyperlipidemia  Anxiety  Depression      ADMISSION DIAGNOSES/PRESENTING PROBLEMS  Septic shock  COVID-19 pneumonia  Parainfluenza  Acute respiratory failure  Acute kidney injury  Hypomagnesemia   Malnutrition  Hyperthyroidism  Systemic lupus  Hypertension  Hyperlipidemia  Anxiety  Depression    HOSPITAL COURSE  Justa Coles was a 63 y.o. female with PMH of anxiety, depression, hyperlipidemia, lupus, hyperthyroid, and COPD with continued smoking presented to the hospital for chest pain, and was admitted with a principal diagnosis of Septic shock.  Patient initially complained of severe left-sided chest pain upon presentation to the emergency department.  She was tachypneic, tachycardic, and short of breath in the emergency department.  Patient was found to have COVID-19 and parainfluenza.  Patient remained hypotensive despite adequate fluid resuscitation and was brought to the intensive care unit for further evaluation and treatment.  Upon presentation to the intensive care unit patient was noted to be obtunded and an ABG was obtained.  Patient was emergently intubated based on ABG results.  CODE BLUE was called on patient with initial rhythm of pulseless electrical activity.  Patient remained PEA throughout code. Time of death noted to be on 2024 at 3:18 AM .  Family was notified and emotional support provided.       PROCEDURES PERFORMED    34  "Intubation    CONSULTING PHYSICIANS  Consults       Date and Time Order Name Status Description    1/17/2024 10:17 PM Intensivist (on-call MD unless specified)              RESULTS REVIEW  LABS  Lab Results (last 24 hours)       Procedure Component Value Units Date/Time    High Sensitivity Troponin T [[id number]]  (Abnormal) Collected: 01/17/24 2023    Specimen: Blood Updated: 01/17/24 2054     HS Troponin T 15 ng/L     Narrative:      High Sensitive Troponin T Reference Range:  <14.0 ng/L- Negative Female for AMI  <22.0 ng/L- Negative Male for AMI  >=14 - Abnormal Female indicating possible myocardial injury.  >=22 - Abnormal Male indicating possible myocardial injury.   Clinicians would have to utilize clinical acumen, EKG, Troponin, and serial changes to determine if it is an Acute Myocardial Infarction or myocardial injury due to an underlying chronic condition.         D-dimer, Quantitative [[id number]]  (Abnormal) Collected: 01/17/24 2023    Specimen: Blood Updated: 01/17/24 2046     D-Dimer, Quantitative 2.54 mg/L (FEU)     Narrative:      According to the assay 's published package insert, a normal (<0.50 mg/L (FEU)) D-dimer result in conjunction with a non-high clinical probability assessment, excludes deep vein thrombosis (DVT) and pulmonary embolism (PE) with high sensitivity.    D-dimer values increase with age and this can make VTE exclusion of an older population difficult. To address this, the [employer], based on best available evidence and recent guidelines, recommends that clinicians use age-adjusted D-dimer thresholds in patients greater than 50 years of age with: a) a low probability of PE who do not meet all Pulmonary Embolism Rule Out Criteria, or b) in those with intermediate probability of PE.   The formula for an age-adjusted D-dimer cut-off is \"age/100\".  For example, a 60 year old patient would have an age-adjusted cut-off of 0.60 mg/L (FEU) and an 80 year old " 0.80 mg/L (FEU).    CBC & Differential [[id number]] Collected: 01/17/24 2023    Specimen: Blood Updated: 01/17/24 2123    Narrative:      The following orders were created for panel order CBC & Differential.  Procedure                               Abnormality         Status                     ---------                               -----------         ------                     CBC Auto Differential[[id number]]        Normal              Final result               Scan Slide[[id number]]                                       Final result                 Please view results for these tests on the individual orders.    Comprehensive Metabolic Panel [[id number]]  (Abnormal) Collected: 01/17/24 2023    Specimen: Blood Updated: 01/17/24 2054     Glucose 132 mg/dL      BUN 40 mg/dL      Creatinine 1.66 mg/dL      Sodium 137 mmol/L      Potassium 3.8 mmol/L      Chloride 102 mmol/L      CO2 19.0 mmol/L      Calcium 8.7 mg/dL      Total Protein 6.0 g/dL      Albumin 3.1 g/dL      ALT (SGPT) 48 U/L      AST (SGOT) 30 U/L      Alkaline Phosphatase 95 U/L      Total Bilirubin 0.6 mg/dL      Globulin 2.9 gm/dL      A/G Ratio 1.1 g/dL      BUN/Creatinine Ratio 24.1     Anion Gap 16.0 mmol/L      eGFR 34.3 mL/min/1.73     Narrative:      GFR Normal >60  Chronic Kidney Disease <60  Kidney Failure <15      BNP [[id number]]  (Abnormal) Collected: 01/17/24 2023    Specimen: Blood Updated: 01/17/24 2054     proBNP 5,544.0 pg/mL     Narrative:      This assay is used as an aid in the diagnosis of individuals suspected of having heart failure. It can be used as an aid in the diagnosis of acute decompensated heart failure (ADHF) in patients presenting with signs and symptoms of ADHF to the emergency department (ED). In addition, NT-proBNP of <300 pg/mL indicates ADHF is not likely.    Age Range Result Interpretation  NT-proBNP Concentration (pg/mL:      <50             Positive            >450                   Nash                  300-450                    Negative             <300    50-75           Positive            >900                  Gray                300-900                  Negative            <300      >75             Positive            >1800                  Gray                300-1800                  Negative            <300    CBC Auto Differential [[id number]]  (Normal) Collected: 01/17/24 2023    Specimen: Blood Updated: 01/17/24 2123     WBC 6.40 10*3/mm3      RBC 4.62 10*6/mm3      Hemoglobin 13.8 g/dL      Hematocrit 43.2 %      MCV 93.5 fL      MCH 29.9 pg      MCHC 31.9 g/dL      RDW 14.7 %      RDW-SD 51.6 fl      MPV 8.9 fL      Platelets 283 10*3/mm3     Scan Slide [[id number]] Collected: 01/17/24 2023    Specimen: Blood Updated: 01/17/24 2123     Scan Slide --     Comment: See Manual Differential Results       Manual Differential [[id number]]  (Abnormal) Collected: 01/17/24 2023    Specimen: Blood Updated: 01/17/24 2123     Neutrophil % 36.0 %      Lymphocyte % 15.0 %      Monocyte % 17.0 %      Eosinophil % 1.0 %      Basophil % 2.0 %      Bands %  21.0 %      Metamyelocyte % 5.0 %      Myelocyte % 3.0 %      Neutrophils Absolute 3.65 10*3/mm3      Lymphocytes Absolute 0.96 10*3/mm3      Monocytes Absolute 1.09 10*3/mm3      Eosinophils Absolute 0.06 10*3/mm3      Basophils Absolute 0.13 10*3/mm3      RBC Morphology Normal     WBC Morphology Normal     Platelet Morphology Normal    Respiratory Panel PCR w/COVID-19(SARS-CoV-2) LAURA/AARTI/ITA/PAD/COR/IRMA In-House, NP Swab in UTM/VTM, 2 HR TAT - Swab, Nasopharynx [[id number]]  (Abnormal) Collected: 01/17/24 2032    Specimen: Swab from Nasopharynx Updated: 01/17/24 2156     ADENOVIRUS, PCR Not Detected     Coronavirus 229E Not Detected     Coronavirus HKU1 Not Detected     Coronavirus NL63 Not Detected     Coronavirus OC43 Not Detected     COVID19 Detected     Human Metapneumovirus Not Detected     Human Rhinovirus/Enterovirus Not Detected     Influenza A PCR Not Detected      Influenza B PCR Not Detected     Parainfluenza Virus 1 Not Detected     Parainfluenza Virus 2 Not Detected     Parainfluenza Virus 3 Detected     Parainfluenza Virus 4 Not Detected     RSV, PCR Not Detected     Bordetella pertussis pcr Not Detected     Bordetella parapertussis PCR Not Detected     Chlamydophila pneumoniae PCR Not Detected     Mycoplasma pneumo by PCR Not Detected    Narrative:      In the setting of a positive respiratory panel with a viral infection PLUS a negative procalcitonin without other underlying concern for bacterial infection, consider observing off antibiotics or discontinuation of antibiotics and continue supportive care. If the respiratory panel is positive for atypical bacterial infection (Bordetella pertussis, Chlamydophila pneumoniae, or Mycoplasma pneumoniae), consider antibiotic de-escalation to target atypical bacterial infection.    Blood Culture - Blood, Arm, Left [[id number]] Collected: 01/17/24 2122    Specimen: Blood from Arm, Left Updated: 01/17/24 2124    High Sensitivity Troponin T 2Hr [[id number]]  (Abnormal) Collected: 01/17/24 2136    Specimen: Blood from Arm, Left Updated: 01/17/24 2207     HS Troponin T 27 ng/L      Troponin T Delta 12 ng/L     Narrative:      High Sensitive Troponin T Reference Range:  <14.0 ng/L- Negative Female for AMI  <22.0 ng/L- Negative Male for AMI  >=14 - Abnormal Female indicating possible myocardial injury.  >=22 - Abnormal Male indicating possible myocardial injury.   Clinicians would have to utilize clinical acumen, EKG, Troponin, and serial changes to determine if it is an Acute Myocardial Infarction or myocardial injury due to an underlying chronic condition.         Procalcitonin [[id number]]  (Abnormal) Collected: 01/17/24 2136    Specimen: Blood from Arm, Left Updated: 01/17/24 2244     Procalcitonin 12.32 ng/mL     Narrative:      As a Marker for Sepsis (Non-Neonates):    1. <0.5 ng/mL represents a low risk of severe sepsis and/or  "septic shock.  2. >2 ng/mL represents a high risk of severe sepsis and/or septic shock.    As a Marker for Lower Respiratory Tract Infections that require antibiotic therapy:    PCT on Admission    Antibiotic Therapy       6-12 Hrs later    >0.5                Strongly Recommended  >0.25 - <0.5        Recommended   0.1 - 0.25          Discouraged              Remeasure/reassess PCT  <0.1                Strongly Discouraged     Remeasure/reassess PCT    As 28 day mortality risk marker: \"Change in Procalcitonin Result\" (>80% or <=80%) if Day 0 (or Day 1) and Day 4 values are available. Refer to http://www.[employer] [facility] [place]pct[place]cal[email or web address]    Change in PCT <=80%  A decrease of PCT levels below or equal to 80% defines a positive change in PCT test result representing a higher risk for 28-day all-cause mortality of patients diagnosed with severe sepsis for septic shock.    Change in PCT >80%  A decrease of PCT levels of more than 80% defines a negative change in PCT result representing a lower risk for 28-day all-cause mortality of patients diagnosed with severe sepsis or septic shock.       Magnesium [[id number]]  (Abnormal) Collected: 01/17/24 2136    Specimen: Blood from Arm, Left Updated: 01/18/24 0016     Magnesium 1.5 mg/dL     POC Lactate [[id number]]  (Abnormal) Collected: 01/17/24 2141    Specimen: Blood Updated: 01/17/24 2143     Lactate 4.4 mmol/L      Comment: Serial Number: [id number]:  [id number]       Blood Culture - Blood, Arm, Right [[id number]] Collected: 01/17/24 2144    Specimen: Blood from Arm, Right Updated: 01/17/24 2147    Blood Gas, Arterial - [[id number]]  (Abnormal) Collected: 01/17/24 2212    Specimen: Arterial Blood Updated: 01/17/24 2215     Site Right Radial     Flo's Test Positive     pH, Arterial 7.304 pH units      pCO2, Arterial 34.7 mm Hg      pO2, Arterial 82.1 mm Hg      HCO3, Arterial 17.2 mmol/L      Base Excess, Arterial -8.3 mmol/L      Comment: Serial Number: [id number]:  [id number] "        O2 Saturation, Arterial 95.0 %      CO2 Content 18.3 mmol/L      Barometric Pressure for Blood Gas --     Comment: N/A        Modality Cannula     FIO2 40 %      Hemodilution No    STAT Lactic Acid, Reflex [[id number]]  (Abnormal) Collected: 01/17/24 2237    Specimen: Blood Updated: 01/17/24 2311     Lactate 4.4 mmol/L     Protime-INR [[id number]]  (Abnormal) Collected: 01/17/24 2237    Specimen: Blood Updated: 01/17/24 2301     Protime 16.1 Seconds      INR 1.52    aPTT [[id number]]  (Abnormal) Collected: 01/17/24 2237    Specimen: Blood Updated: 01/17/24 2301     PTT 35.0 seconds     POC Glucose Once [[id number]]  (Abnormal) Collected: 01/17/24 2352    Specimen: Blood Updated: 01/17/24 2353     Glucose 119 mg/dL      Comment: Serial Number: [id number]:  [id number]       MRSA Screen, PCR (Inpatient) - Swab, Nares [[id number]]  (Abnormal) Collected: 01/17/24 2354    Specimen: Swab from Nares Updated: 01/18/24 0133     MRSA PCR MRSA Detected    Narrative:      The negative predictive value of this diagnostic test is high and should only be used to consider de-escalating anti-MRSA therapy. A positive result may indicate colonization with MRSA and must be correlated clinically.    Blood Gas, Arterial - [[id number]]  (Abnormal) Collected: 01/17/24 2355    Specimen: Arterial Blood Updated: 01/18/24 0038     Site Right Brachial     Flo's Test N/A     pH, Arterial 6.854 pH units      pCO2, Arterial 127.9 mm Hg      pO2, Arterial 118.1 mm Hg      HCO3, Arterial 22.5 mmol/L      Base Excess, Arterial -14.0 mmol/L      Comment: Serial Number: [id number]:  [id number]        O2 Saturation, Arterial 92.4 %      Barometric Pressure for Blood Gas --     Comment: N/A        Modality NRB     FIO2 100 %      Hemodilution No    POC Creatinine [[id number]]  (Abnormal) Collected: 01/17/24 2355    Specimen: Arterial Blood Updated: 01/17/24 2359     Creatinine 1.11 mg/dL      Comment: Serial Number: [id number]:  [id number]         eGFR 55.6 mL/min/1.73     POCT Electrolytes +HGB +HCT [[id number]]  (Abnormal) Collected: 01/17/24 2355    Specimen: Arterial Blood Updated: 01/17/24 2359     Sodium 143 mmol/L      POC Potassium 4.4 mmol/L      Ionized Calcium 1.16 mmol/L      Comment: Serial Number: [id number]:  [id number]        Glucose 123 mg/dL      Hematocrit 42 %      Hemoglobin 14.1 g/dL     POC Lactate [[id number]]  (Abnormal) Collected: 01/17/24 2355    Specimen: Arterial Blood Updated: 01/18/24 0038     Lactate 3.4 mmol/L      Comment: Serial Number: [id number]:  [id number]       POC Glucose Once [[id number]]  (Abnormal) Collected: 01/17/24 2355    Specimen: Arterial Blood Updated: 01/17/24 2359     Glucose 123 mg/dL      Comment: Serial Number: [id number]:  [id number]       Blood Gas, Arterial -Obtain on: Current Settings [[id number]]  (Abnormal) Collected: 01/18/24 0144    Specimen: Arterial Blood Updated: 01/18/24 0152     Site Right Brachial     Flo's Test Positive     pH, Arterial 6.911 pH units      pCO2, Arterial 82.3 mm Hg      pO2, Arterial 208.6 mm Hg      HCO3, Arterial 16.6 mmol/L      Base Excess, Arterial -17.8 mmol/L      Comment: Serial Number: [id number]:  [id number]        O2 Saturation, Arterial 98.8 %      CO2 Content 19.1 mmol/L      Barometric Pressure for Blood Gas --     Comment: N/A        Modality Adult Vent     FIO2 100 %      Ventilator Mode AC     Set Tidal Volume 500     PEEP 5     Hemodilution No     Respiratory Rate 26    POC Glucose Once [[id number]]  (Normal) Collected: 01/18/24 0247    Specimen: Blood Updated: 01/18/24 0248     Glucose 82 mg/dL      Comment: Serial Number: [id number]:  [id number]               MICRO:  All cultures reviewed and negative, or pending with no growth unless otherwise designated in chart below.  Microbiology Results Abnormal       None               RADIOLOGY:  XR Chest 1 View    Result Date: 1/18/2024  Impression: ET tube in the midthoracic trachea. Slight worsening of diffuse  left lung pneumonia Electronically Signed: Nilesh Fairbanks MD  1/18/2024 12:49 AM EST  Workstation ID: [id number]    CT Angiogram Chest Pulmonary Embolism    Result Date: 1/17/2024  Impression: Limited study due to motion artifact. No evidence of pulmonary embolism. No evidence of thoracic aortic aneurysm or dissection. Advanced emphysematous changes. Extensive airspace disease throughout the left upper lobe compatible with pneumonia. Electronically Signed: Jun Sifuentes MD  1/17/2024 10:38 PM EST  Workstation ID: [id number]    XR Chest 1 View    Result Date: 1/17/2024  Impression: New left-sided airspace disease consistent with pneumonia. Electronically Signed: Tomi Wright MD  1/17/2024 8:40 PM EST  Workstation ID: [id number]     For more specific information regarding this patient’s hospital stay at [facility], please refer to patient’s full medical record.  This summary has been culminated by this nurse practitioner on behalf of the critical care team at the request of Dr. Farrar.    Electronically signed by JEFF Shrestha, 01/18/24 at 05:16 EST.

## 2024-01-18 NOTE — H&P
Critical Care History and Physical     Justa oCles : 1960 MRN:[id number] LOS:0 ROOM:      Reason for admission: Septic shock     Assessment / Plan     Septic Shock: ( WBC>12 or <4 or >10% bands, Temp > 100.4 or < 96.8, HR>90, RR >20 or PCO2 <32, Lactic acid>2, and MAP<65 or SBP<90 )  Likely due to Pneumonia from COVID-19 and parainfluenza.   Will do blood / urine / sputum cultures.   Urine antigens  Started on cefepime and vanc.   Lasix given due to frothy pink sputum in ET tube at intubation, consider continuation  Continue solu-medrol, wean based on patient's condition  Persistent hypotension in spite of adequate fluid resuscitation.   C/w additional fluids as needed. Avoid fluid overload.   Titrate vasopressors for a target MAP of 65.     Acute Respiratory Failure  -CXR Reviewed  -EKG Reviewed  -ABG, monitor as ordered  -BNP, monitor as ordered  -Duoneb PRN  -Continue sedation and pain medication, titrate to effect  -Continue mechanical ventilation support with weaning as tolerated to baseline  -Titrate oxygen support delivery method for O2 SAT > 92%    Kidney injury, acute  -UA reviewed  -Renal ultrasound  -Monitor and trend labs  -Avoid nephrotoxic medications, hypotension, and NSAIDS  -Renally dose medications  -Monitor urine output  -Consider Nephrology consult if creatinine fails to improve    Hypomagnesemia  -Serum Mag 1.5  -Replacement per protocol  -Monitor and trend labs    Malnutrition  -Dietitian consult    Hyperthyroidism  -TSH in am  -Continue Tapazole when clinically appropriate    Systemic Lupus  -Stable  -Continue current outpatient treatment    Hyperlipidemia; Essential Hypertension, Chronic  -Continue home medications as appropriate   - Monitor with routine vital signs     Anxiety/Depression  -Continue home meds as appropriate        Code Status (Patient has no pulse and is not breathing): CPR (Attempt to Resuscitate)  Medical Interventions (Patient has pulse or is breathing):  Full Support       Nutrition:   NPO Diet NPO Type: Strict NPO     DVT prophylaxis:  Medical DVT prophylaxis orders are present.     History of Present illness     Justa Coles is a 63 y.o. female with PMH of anxiety, depression, hyperlipidemia, lupus, hyperthyroid, and COPD with continued smoking presented to the hospital for chest pain, and was admitted with a principal diagnosis of Septic shock.  Patient initially complained of severe left-sided chest pain upon presentation to the emergency department.  She is found to be tachypneic, tachycardic, and short of breath.  Patient was found to have COVID-19 and parainfluenza in the emergency department.  Patient remained hypotensive despite adequate fluid resuscitation and was brought to the intensive care unit for further evaluation and treatment.  Upon presentation to the intensive care unit patient was noted to be obtunded and an ABG was obtained.  Patient was emergently intubated based on ABG results.      ACP: Patient obtunded at time of assessment, no family at bedside will remain full code with full intervention; her son is listed as her decision-maker no answer when called via telephone.    Patient was seen and examined on 24 at 23:04 EST .    Subjective / Review of systems     Review of Systems   Unable to perform ROS: Acuity of condition        Past Medical/Surgical/Social/Family History & Allergies     Past Medical History:   Diagnosis Date    Graves disease     Hyperlipidemia     Hyperthyroidism 2022      Past Surgical History:   Procedure Laterality Date    APPENDECTOMY      BREAST BIOPSY Left 2020    stereo     SECTION  , ,     EXCISION BAKERS CYST KNEE Left     POSTPARTUM TUBAL LIGATION      TONSILLECTOMY      TUBAL ABDOMINAL LIGATION        Social History     Socioeconomic History    Marital status:    Tobacco Use    Smoking status: Former     Packs/day: 1.00     Years: 20.00     Additional pack years:  0.00     Total pack years: 20.00     Types: Cigarettes     Quit date: 2011     Years since quittin.6     Passive exposure: Current    Smokeless tobacco: Never   Vaping Use    Vaping Use: Never used   Substance and Sexual Activity    Alcohol use: Not Currently    Drug use: Never    Sexual activity: Defer      Family History   Problem Relation Age of Onset    Colon cancer Father     Colon cancer Maternal Grandmother     Colon cancer Maternal Grandfather     Colon cancer Paternal Grandfather       Allergies   Allergen Reactions    Sulfa Antibiotics Hives      Social Determinants of Health     Tobacco Use: Medium Risk (2023)    Patient History     Smoking Tobacco Use: Former     Smokeless Tobacco Use: Never     Passive Exposure: Current   Alcohol Use: Not At Risk (2023)    AUDIT-C     Frequency of Alcohol Consumption: Monthly or less     Average Number of Drinks: 1 or 2     Frequency of Binge Drinking: Never   Financial Resource Strain: Not on file   Food Insecurity: No Food Insecurity (2023)    Hunger Vital Sign     Worried About Running Out of Food in the Last Year: Never true     Ran Out of Food in the Last Year: Never true   Transportation Needs: Not on file   Physical Activity: Not on file   Stress: Not on file   Social Connections: Unknown (10/11/2023)    Family and Community Support     Help with Day-to-Day Activities: Not on file     Lonely or Isolated: Not on file   Interpersonal Safety: Not At Risk (2024)    Abuse Screen     Unsafe at Home or Work/School: no     Feels Threatened by Someone?: no     Does Anyone Keep You from Contacting Others or Doint Things Outside the Home?: no     Physical Sign of Abuse Present: no   Depression: Not at risk (2020)    PHQ-2     PHQ-2 Score: 0   Housing Stability: Not At Risk (2023)    Housing Stability     Current Living Arrangements: home     Potentially Unsafe Housing Conditions: none   Utilities: Not on file   Health Literacy: Unknown  (8/21/2023)    Education     Help with school or training?: Not on file     Preferred Language: English   Employment: Unknown (10/11/2023)    Employment     Do you want help finding or keeping work or a job?: Not on file   Disabilities: Not At Risk (8/21/2023)    Disabilities     Concentrating, Remembering, or Making Decisions Difficulty: no     Doing Errands Independently Difficulty: no        Home Medications     Prior to Admission medications    Medication Sig Start Date End Date Taking? Authorizing Provider   albuterol sulfate  (90 Base) MCG/ACT inhaler Inhale 2 puffs Every 6 (Six) Hours As Needed for Wheezing or Shortness of Air. 6/20/22   Maria De Jesus Justin MD   amLODIPine (NORVASC) 2.5 MG tablet Take 1 tablet by mouth Daily.    Ronald Sutherland MD   atorvastatin (LIPITOR) 10 MG tablet Take 1 tablet by mouth 2 (Two) Times a Week. Monday and Thursdays    Ronald Sutherland MD   azithromycin (Zithromax) 250 MG tablet Start tomorrow 8/24/2023, Take 1 tablet by mouth daily for 3 days. 8/23/23   Desire Roldan APRN   budesonide (PULMICORT) 1 MG/2ML nebulizer solution Take 2 mL by nebulization Daily. 3/8/23   Ronald Sutherland MD   Fluticasone-Umeclidin-Vilant (Trelegy Ellipta) 200-62.5-25 MCG/ACT aerosol powder  Inhale 1 puff Daily.    Ronald Sutherland MD   levalbuterol (XOPENEX HFA) 45 MCG/ACT inhaler Inhale 1-2 puffs Every 6 (Six) Hours As Needed for Wheezing or Shortness of Air. 6/22/22   Maria De Jesus Justin MD   levalbuterol (XOPENEX) 1.25 MG/3ML nebulizer solution Take 1 ampule by nebulization Every 4 (Four) Hours. 5/27/23   Ronald Sutherland MD   methIMAzole (TAPAZOLE) 5 MG tablet TAKE 1 TABLET BY MOUTH EVERY DAY 9/18/23   Oscar Bhandari MD   propranolol (INDERAL) 20 MG tablet Take 1 tablet by mouth 2 (Two) Times a Day.    Ronald Sutherland MD        Objective / Physical Exam     Vital signs:  Temp: (!) 100.6 °F (38.1 °C)  BP: 137/68  Heart Rate: (!) 143  Resp: (!)  32  SpO2: 92 %  Weight: 98 kg (216 lb 0.8 oz)    Admission Weight: Weight: 98 kg (216 lb 0.8 oz)    Physical Exam  Vitals and nursing note reviewed.   Constitutional:       Appearance: Normal appearance. She is ill-appearing.      Comments: Chronically and acutely ill appearing   HENT:      Head: Normocephalic.      Nose: Nose normal.      Mouth/Throat:      Mouth: Mucous membranes are moist.      Pharynx: Oropharynx is clear.   Eyes:      Pupils: Pupils are equal, round, and reactive to light.   Cardiovascular:      Rate and Rhythm: Normal rate and regular rhythm.      Pulses: Normal pulses.      Heart sounds: Normal heart sounds.   Pulmonary:      Effort: Pulmonary effort is normal.      Breath sounds: Normal breath sounds.   Abdominal:      General: Bowel sounds are normal.      Palpations: Abdomen is soft.   Musculoskeletal:         General: Normal range of motion.      Cervical back: Normal range of motion.   Skin:     General: Skin is warm and dry.      Capillary Refill: Capillary refill takes 2 to 3 seconds.   Neurological:      Mental Status: She is alert.      Comments: obtunded          Labs     Results from last 7 days   Lab Units 01/17/24 2023   WBC 10*3/mm3 6.40   HEMATOCRIT % 43.2   PLATELETS 10*3/mm3 283      Results from last 7 days   Lab Units 01/17/24 2023   SODIUM mmol/L 137   POTASSIUM mmol/L 3.8   CHLORIDE mmol/L 102   CO2 mmol/L 19.0*   BUN mg/dL 40*   CREATININE mg/dL 1.66*        Imaging     XR Chest 1 View    Result Date: 1/18/2024  Impression: ET tube in the midthoracic trachea. Slight worsening of diffuse left lung pneumonia Electronically Signed: Nilesh Fairbanks MD  1/18/2024 12:49 AM EST  Workstation ID: [id number]    CT Angiogram Chest Pulmonary Embolism    Result Date: 1/17/2024  Impression: Limited study due to motion artifact. No evidence of pulmonary embolism. No evidence of thoracic aortic aneurysm or dissection. Advanced emphysematous changes. Extensive airspace disease throughout  the left upper lobe compatible with pneumonia. Electronically Signed: Jun Sifuentes MD  1/17/2024 10:38 PM EST  Workstation ID: [id number]    XR Chest 1 View    Result Date: 1/17/2024  Impression: New left-sided airspace disease consistent with pneumonia. Electronically Signed: Tomi Wright MD  1/17/2024 8:40 PM EST  Workstation ID: [id number]       Chest X ray: My independent assessment showed infiltrates    EKG: My independent evaluation showed sinus tach, no ST changes    Current Medications     Scheduled Meds:  heparin (porcine), 5,000 Units, Subcutaneous, Q8H  ipratropium, 2 puff, Inhalation, 4x Daily - RT  ipratropium-albuterol, 3 mL, Nebulization, Once  senna-docusate sodium, 2 tablet, Oral, BID  sodium chloride, 10 mL, Intravenous, Q12H  vancomycin, 20 mg/kg (Adjusted), Intravenous, Once         Continuous Infusions:  norepinephrine, 0.02-0.3 mcg/kg/min, Last Rate: 0.1 mcg/kg/min (01/17/24 2206)       Plan discussed with RN. Reviewed all other data in the last 24 hours, including but not limited to vitals, labs, microbiology, imaging and pertinent notes from other providers.       JEFF Shrestha   Critical Care  01/17/24   23:04 EST

## 2024-01-19 NOTE — CASE MANAGEMENT/SOCIAL WORK
Case Management Discharge Note      Final Note: Patient  prior to CM assessment      Final Discharge Disposition Code: 20 -

## 2024-01-20 LAB
BACTERIA SPEC AEROBE CULT: ABNORMAL
GRAM STN SPEC: ABNORMAL
ISOLATED FROM: ABNORMAL

## 2024-01-22 LAB — BACTERIA SPEC AEROBE CULT: NORMAL
